# Patient Record
Sex: MALE | Race: WHITE | NOT HISPANIC OR LATINO | URBAN - METROPOLITAN AREA
[De-identification: names, ages, dates, MRNs, and addresses within clinical notes are randomized per-mention and may not be internally consistent; named-entity substitution may affect disease eponyms.]

---

## 2017-01-21 ENCOUNTER — INPATIENT (INPATIENT)
Facility: HOSPITAL | Age: 62
LOS: 7 days | Discharge: HOME | DRG: 208 | End: 2017-01-29
Attending: HOSPITALIST | Admitting: INTERNAL MEDICINE
Payer: COMMERCIAL

## 2017-01-21 VITALS
OXYGEN SATURATION: 86 % | TEMPERATURE: 97 F | DIASTOLIC BLOOD PRESSURE: 92 MMHG | WEIGHT: 285.06 LBS | HEIGHT: 66 IN | RESPIRATION RATE: 18 BRPM | SYSTOLIC BLOOD PRESSURE: 152 MMHG | HEART RATE: 77 BPM

## 2017-01-21 PROCEDURE — 71010: CPT | Mod: 26

## 2017-01-21 PROCEDURE — 70450 CT HEAD/BRAIN W/O DYE: CPT | Mod: 26

## 2017-01-21 PROCEDURE — 93010 ELECTROCARDIOGRAM REPORT: CPT

## 2017-01-21 NOTE — ED STATDOCS - PROGRESS NOTE DETAILS
This is a 62 y/o M HTN presenting to the ED complaining of head pain s/p fall in the bathroom today. He states that he slipped today on the bathroom floor and hit his head on the garbage can. Denies LOC. Denies home O2. Pt also reports that he has borderline COPD and notes edema to extremities x 5 days. last tetanus unknown. pt will be moved to the main ED for further evaluation by another provider   PMD: Dr. Mcknight This is a 60 y/o M HTN presenting to the ED complaining of head pain s/p fall in the bathroom today. He states that he slipped today on the bathroom floor and hit his head on the garbage can. Denies LOC. Denies home O2. Pt also reports that he has borderline COPD and notes edema to extremities x 5 days. last tetanus unknown. On exam, morbidly obeses, b/l wheezes 1/3 up b/l sides, leg swelling noted, no hand swelling despite hx of hand swelling. Pt with anasarca, hypoxia, and recent head trama will be moved to the main ED for further evaluation by another provider   PMD: Dr. Mcknight

## 2017-01-21 NOTE — ED ADULT TRIAGE NOTE - CHIEF COMPLAINT QUOTE
pt s/p fall open wound to right sided of head, dry blood, looks like hematoma  hx HTN. COPD  pt fall last wk as well, as per wife pt being treated for bronchitis/PN

## 2017-01-22 DIAGNOSIS — J96.01 ACUTE RESPIRATORY FAILURE WITH HYPOXIA: ICD-10-CM

## 2017-01-22 DIAGNOSIS — I50.9 HEART FAILURE, UNSPECIFIED: ICD-10-CM

## 2017-01-22 DIAGNOSIS — J18.9 PNEUMONIA, UNSPECIFIED ORGANISM: ICD-10-CM

## 2017-01-22 DIAGNOSIS — R09.02 HYPOXEMIA: ICD-10-CM

## 2017-01-22 DIAGNOSIS — J44.1 CHRONIC OBSTRUCTIVE PULMONARY DISEASE WITH (ACUTE) EXACERBATION: ICD-10-CM

## 2017-01-22 LAB
ALBUMIN SERPL ELPH-MCNC: 3.6 G/DL — SIGNIFICANT CHANGE UP (ref 3.3–5.2)
ALP SERPL-CCNC: 97 U/L — SIGNIFICANT CHANGE UP (ref 40–120)
ALT FLD-CCNC: 55 U/L — HIGH
ANION GAP SERPL CALC-SCNC: 11 MMOL/L — SIGNIFICANT CHANGE UP (ref 5–17)
ANION GAP SERPL CALC-SCNC: 11 MMOL/L — SIGNIFICANT CHANGE UP (ref 5–17)
APPEARANCE UR: CLEAR — SIGNIFICANT CHANGE UP
APTT BLD: 36.3 SEC — SIGNIFICANT CHANGE UP (ref 27.5–37.4)
AST SERPL-CCNC: 39 U/L — SIGNIFICANT CHANGE UP
BASE EXCESS BLDA CALC-SCNC: 2 MMOL/L — SIGNIFICANT CHANGE UP (ref -3–3)
BASE EXCESS BLDA CALC-SCNC: 4.3 MMOL/L — HIGH (ref -3–3)
BASE EXCESS BLDA CALC-SCNC: 7.6 MMOL/L — HIGH (ref -3–3)
BASOPHILS # BLD AUTO: 0 K/UL — SIGNIFICANT CHANGE UP (ref 0–0.2)
BASOPHILS NFR BLD AUTO: 0.3 % — SIGNIFICANT CHANGE UP (ref 0–2)
BILIRUB SERPL-MCNC: 0.3 MG/DL — LOW (ref 0.4–2)
BILIRUB UR-MCNC: NEGATIVE — SIGNIFICANT CHANGE UP
BLD GP AB SCN SERPL QL: SIGNIFICANT CHANGE UP
BLOOD GAS COMMENTS ARTERIAL: SIGNIFICANT CHANGE UP
BUN SERPL-MCNC: 27 MG/DL — HIGH (ref 8–20)
BUN SERPL-MCNC: 28 MG/DL — HIGH (ref 8–20)
CALCIUM SERPL-MCNC: 8.2 MG/DL — LOW (ref 8.6–10.2)
CALCIUM SERPL-MCNC: 8.7 MG/DL — SIGNIFICANT CHANGE UP (ref 8.6–10.2)
CHLORIDE SERPL-SCNC: 90 MMOL/L — LOW (ref 98–107)
CHLORIDE SERPL-SCNC: 92 MMOL/L — LOW (ref 98–107)
CK MB CFR SERPL CALC: 12.1 NG/ML — HIGH (ref 0–6.7)
CK SERPL-CCNC: 547 U/L — HIGH (ref 30–200)
CK SERPL-CCNC: 626 U/L — HIGH (ref 30–200)
CO2 SERPL-SCNC: 32 MMOL/L — HIGH (ref 22–29)
CO2 SERPL-SCNC: 32 MMOL/L — HIGH (ref 22–29)
COLOR SPEC: YELLOW — SIGNIFICANT CHANGE UP
CREAT SERPL-MCNC: 1.03 MG/DL — SIGNIFICANT CHANGE UP (ref 0.5–1.3)
CREAT SERPL-MCNC: 1.14 MG/DL — SIGNIFICANT CHANGE UP (ref 0.5–1.3)
DIFF PNL FLD: ABNORMAL
EOSINOPHIL # BLD AUTO: 0.2 K/UL — SIGNIFICANT CHANGE UP (ref 0–0.5)
EOSINOPHIL NFR BLD AUTO: 3.5 % — SIGNIFICANT CHANGE UP (ref 0–6)
GAS PNL BLDA: SIGNIFICANT CHANGE UP
GLUCOSE SERPL-MCNC: 138 MG/DL — HIGH (ref 70–115)
GLUCOSE SERPL-MCNC: 97 MG/DL — SIGNIFICANT CHANGE UP (ref 70–115)
GLUCOSE UR QL: NEGATIVE MG/DL — SIGNIFICANT CHANGE UP
HCO3 BLDA-SCNC: 26 MMOL/L — SIGNIFICANT CHANGE UP (ref 20–26)
HCO3 BLDA-SCNC: 28 MMOL/L — HIGH (ref 20–26)
HCO3 BLDA-SCNC: 31 MMOL/L — HIGH (ref 20–26)
HCT VFR BLD CALC: 40.6 % — LOW (ref 42–52)
HGB BLD-MCNC: 13.1 G/DL — LOW (ref 14–18)
HOROWITZ INDEX BLDA+IHG-RTO: 0.4 — SIGNIFICANT CHANGE UP
HOROWITZ INDEX BLDA+IHG-RTO: 0.5 — SIGNIFICANT CHANGE UP
HOROWITZ INDEX BLDA+IHG-RTO: 60 — SIGNIFICANT CHANGE UP
INR BLD: 1.05 RATIO — SIGNIFICANT CHANGE UP (ref 0.88–1.16)
KETONES UR-MCNC: NEGATIVE — SIGNIFICANT CHANGE UP
LACTATE BLDV-MCNC: 0.7 MMOL/L — SIGNIFICANT CHANGE UP (ref 0.5–1.6)
LEUKOCYTE ESTERASE UR-ACNC: NEGATIVE — SIGNIFICANT CHANGE UP
LYMPHOCYTES # BLD AUTO: 1.1 K/UL — SIGNIFICANT CHANGE UP (ref 1–4.8)
LYMPHOCYTES # BLD AUTO: 16 % — LOW (ref 20–55)
MAGNESIUM SERPL-MCNC: 1.9 MG/DL — SIGNIFICANT CHANGE UP (ref 1.8–2.5)
MCHC RBC-ENTMCNC: 28.6 PG — SIGNIFICANT CHANGE UP (ref 27–31)
MCHC RBC-ENTMCNC: 32.3 G/DL — SIGNIFICANT CHANGE UP (ref 32–36)
MCV RBC AUTO: 88.6 FL — SIGNIFICANT CHANGE UP (ref 80–94)
MONOCYTES # BLD AUTO: 0.8 K/UL — SIGNIFICANT CHANGE UP (ref 0–0.8)
MONOCYTES NFR BLD AUTO: 11.3 % — HIGH (ref 3–10)
NEUTROPHILS # BLD AUTO: 4.7 K/UL — SIGNIFICANT CHANGE UP (ref 1.8–8)
NEUTROPHILS NFR BLD AUTO: 68.6 % — SIGNIFICANT CHANGE UP (ref 37–73)
NITRITE UR-MCNC: NEGATIVE — SIGNIFICANT CHANGE UP
NT-PROBNP SERPL-SCNC: 84 PG/ML — SIGNIFICANT CHANGE UP (ref 0–300)
PCO2 BLDA: 57 MMHG — HIGH (ref 35–45)
PCO2 BLDA: 90 MMHG — CRITICAL HIGH (ref 35–45)
PCO2 BLDA: 98 MMHG — CRITICAL HIGH (ref 35–45)
PH BLDA: 7.19 — CRITICAL LOW (ref 7.35–7.45)
PH BLDA: 7.2 — CRITICAL LOW (ref 7.35–7.45)
PH BLDA: 7.39 — SIGNIFICANT CHANGE UP (ref 7.35–7.45)
PH UR: 6 — SIGNIFICANT CHANGE UP (ref 4.8–8)
PLATELET # BLD AUTO: 225 K/UL — SIGNIFICANT CHANGE UP (ref 150–400)
PO2 BLDA: 60 MMHG — LOW (ref 83–108)
PO2 BLDA: 71 MMHG — LOW (ref 83–108)
PO2 BLDA: 86 MMHG — SIGNIFICANT CHANGE UP (ref 83–108)
POTASSIUM SERPL-MCNC: 4.1 MMOL/L — SIGNIFICANT CHANGE UP (ref 3.5–5.3)
POTASSIUM SERPL-MCNC: 4.5 MMOL/L — SIGNIFICANT CHANGE UP (ref 3.5–5.3)
POTASSIUM SERPL-SCNC: 4.1 MMOL/L — SIGNIFICANT CHANGE UP (ref 3.5–5.3)
POTASSIUM SERPL-SCNC: 4.5 MMOL/L — SIGNIFICANT CHANGE UP (ref 3.5–5.3)
PROT SERPL-MCNC: 6.6 G/DL — SIGNIFICANT CHANGE UP (ref 6.6–8.7)
PROT UR-MCNC: NEGATIVE MG/DL — SIGNIFICANT CHANGE UP
PROTHROM AB SERPL-ACNC: 11.6 SEC — SIGNIFICANT CHANGE UP (ref 10–13.1)
RAPID RVP RESULT: SIGNIFICANT CHANGE UP
RBC # BLD: 4.58 M/UL — LOW (ref 4.6–6.2)
RBC # FLD: 15.1 % — SIGNIFICANT CHANGE UP (ref 11–15.6)
SAO2 % BLDA: 91 % — LOW (ref 95–99)
SAO2 % BLDA: 92 % — LOW (ref 95–99)
SAO2 % BLDA: 94 % — LOW (ref 95–99)
SODIUM SERPL-SCNC: 133 MMOL/L — LOW (ref 135–145)
SODIUM SERPL-SCNC: 135 MMOL/L — SIGNIFICANT CHANGE UP (ref 135–145)
SP GR SPEC: 1.01 — SIGNIFICANT CHANGE UP (ref 1.01–1.02)
TROPONIN T SERPL-MCNC: <0.01 NG/ML — SIGNIFICANT CHANGE UP (ref 0–0.06)
TROPONIN T SERPL-MCNC: <0.01 NG/ML — SIGNIFICANT CHANGE UP (ref 0–0.06)
TYPE + AB SCN PNL BLD: SIGNIFICANT CHANGE UP
UROBILINOGEN FLD QL: NEGATIVE MG/DL — SIGNIFICANT CHANGE UP
WBC # BLD: 6.88 K/UL — SIGNIFICANT CHANGE UP (ref 4.8–10.8)
WBC # FLD AUTO: 6.88 K/UL — SIGNIFICANT CHANGE UP (ref 4.8–10.8)

## 2017-01-22 PROCEDURE — 71010: CPT | Mod: 26

## 2017-01-22 PROCEDURE — 99284 EMERGENCY DEPT VISIT MOD MDM: CPT | Mod: 25

## 2017-01-22 PROCEDURE — 99291 CRITICAL CARE FIRST HOUR: CPT

## 2017-01-22 PROCEDURE — 71275 CT ANGIOGRAPHY CHEST: CPT | Mod: 26

## 2017-01-22 PROCEDURE — 12002 RPR S/N/AX/GEN/TRNK2.6-7.5CM: CPT

## 2017-01-22 RX ORDER — VECURONIUM BROMIDE 20 MG/1
10 INJECTION, POWDER, FOR SOLUTION INTRAVENOUS ONCE
Qty: 0 | Refills: 0 | Status: COMPLETED | OUTPATIENT
Start: 2017-01-22 | End: 2017-01-22

## 2017-01-22 RX ORDER — DEXMEDETOMIDINE HYDROCHLORIDE IN 0.9% SODIUM CHLORIDE 4 UG/ML
0.4 INJECTION INTRAVENOUS
Qty: 200 | Refills: 0 | Status: DISCONTINUED | OUTPATIENT
Start: 2017-01-22 | End: 2017-01-25

## 2017-01-22 RX ORDER — FENTANYL CITRATE 50 UG/ML
100 INJECTION INTRAVENOUS
Qty: 0 | Refills: 0 | Status: DISCONTINUED | OUTPATIENT
Start: 2017-01-22 | End: 2017-01-22

## 2017-01-22 RX ORDER — AZITHROMYCIN 500 MG/1
500 TABLET, FILM COATED ORAL ONCE
Qty: 0 | Refills: 0 | Status: COMPLETED | OUTPATIENT
Start: 2017-01-22 | End: 2017-01-22

## 2017-01-22 RX ORDER — PANTOPRAZOLE SODIUM 20 MG/1
40 TABLET, DELAYED RELEASE ORAL DAILY
Qty: 0 | Refills: 0 | Status: DISCONTINUED | OUTPATIENT
Start: 2017-01-22 | End: 2017-01-24

## 2017-01-22 RX ORDER — SODIUM CHLORIDE 9 MG/ML
1000 INJECTION INTRAMUSCULAR; INTRAVENOUS; SUBCUTANEOUS ONCE
Qty: 0 | Refills: 0 | Status: DISCONTINUED | OUTPATIENT
Start: 2017-01-22 | End: 2017-01-22

## 2017-01-22 RX ORDER — ENOXAPARIN SODIUM 100 MG/ML
40 INJECTION SUBCUTANEOUS DAILY
Qty: 0 | Refills: 0 | Status: DISCONTINUED | OUTPATIENT
Start: 2017-01-22 | End: 2017-01-29

## 2017-01-22 RX ORDER — FUROSEMIDE 40 MG
40 TABLET ORAL ONCE
Qty: 0 | Refills: 0 | Status: COMPLETED | OUTPATIENT
Start: 2017-01-22 | End: 2017-01-22

## 2017-01-22 RX ORDER — INFLUENZA VIRUS VACCINE 15; 15; 15; 15 UG/.5ML; UG/.5ML; UG/.5ML; UG/.5ML
0.5 SUSPENSION INTRAMUSCULAR ONCE
Qty: 0 | Refills: 0 | Status: COMPLETED | OUTPATIENT
Start: 2017-01-22 | End: 2017-01-22

## 2017-01-22 RX ORDER — AZITHROMYCIN 500 MG/1
TABLET, FILM COATED ORAL
Qty: 0 | Refills: 0 | Status: DISCONTINUED | OUTPATIENT
Start: 2017-01-22 | End: 2017-01-24

## 2017-01-22 RX ORDER — CHLORHEXIDINE GLUCONATE 213 G/1000ML
15 SOLUTION TOPICAL
Qty: 0 | Refills: 0 | Status: DISCONTINUED | OUTPATIENT
Start: 2017-01-22 | End: 2017-01-25

## 2017-01-22 RX ORDER — IPRATROPIUM/ALBUTEROL SULFATE 18-103MCG
3 AEROSOL WITH ADAPTER (GRAM) INHALATION EVERY 6 HOURS
Qty: 0 | Refills: 0 | Status: DISCONTINUED | OUTPATIENT
Start: 2017-01-22 | End: 2017-01-29

## 2017-01-22 RX ORDER — NOREPINEPHRINE BITARTRATE/D5W 8 MG/250ML
0.3 PLASTIC BAG, INJECTION (ML) INTRAVENOUS
Qty: 8 | Refills: 0 | Status: DISCONTINUED | OUTPATIENT
Start: 2017-01-22 | End: 2017-01-23

## 2017-01-22 RX ORDER — AZITHROMYCIN 500 MG/1
500 TABLET, FILM COATED ORAL EVERY 24 HOURS
Qty: 0 | Refills: 0 | Status: DISCONTINUED | OUTPATIENT
Start: 2017-01-23 | End: 2017-01-24

## 2017-01-22 RX ORDER — PROPOFOL 10 MG/ML
10 INJECTION, EMULSION INTRAVENOUS
Qty: 1000 | Refills: 0 | Status: DISCONTINUED | OUTPATIENT
Start: 2017-01-22 | End: 2017-01-23

## 2017-01-22 RX ORDER — ETOMIDATE 2 MG/ML
20 INJECTION INTRAVENOUS ONCE
Qty: 0 | Refills: 0 | Status: COMPLETED | OUTPATIENT
Start: 2017-01-22 | End: 2017-01-22

## 2017-01-22 RX ADMIN — Medication 40 MILLIGRAM(S): at 08:00

## 2017-01-22 RX ADMIN — DEXMEDETOMIDINE HYDROCHLORIDE IN 0.9% SODIUM CHLORIDE 13.65 MICROGRAM(S)/KG/HR: 4 INJECTION INTRAVENOUS at 13:38

## 2017-01-22 RX ADMIN — DEXMEDETOMIDINE HYDROCHLORIDE IN 0.9% SODIUM CHLORIDE 13.65 MICROGRAM(S)/KG/HR: 4 INJECTION INTRAVENOUS at 18:11

## 2017-01-22 RX ADMIN — DEXMEDETOMIDINE HYDROCHLORIDE IN 0.9% SODIUM CHLORIDE 13.65 MICROGRAM(S)/KG/HR: 4 INJECTION INTRAVENOUS at 23:01

## 2017-01-22 RX ADMIN — PANTOPRAZOLE SODIUM 40 MILLIGRAM(S): 20 TABLET, DELAYED RELEASE ORAL at 13:38

## 2017-01-22 RX ADMIN — Medication 3 MILLILITER(S): at 15:02

## 2017-01-22 RX ADMIN — PROPOFOL 7.74 MICROGRAM(S)/KG/MIN: 10 INJECTION, EMULSION INTRAVENOUS at 09:30

## 2017-01-22 RX ADMIN — DEXMEDETOMIDINE HYDROCHLORIDE IN 0.9% SODIUM CHLORIDE 13.65 MICROGRAM(S)/KG/HR: 4 INJECTION INTRAVENOUS at 10:20

## 2017-01-22 RX ADMIN — VECURONIUM BROMIDE 10 MILLIGRAM(S): 20 INJECTION, POWDER, FOR SOLUTION INTRAVENOUS at 08:40

## 2017-01-22 RX ADMIN — FENTANYL CITRATE 100 MICROGRAM(S): 50 INJECTION INTRAVENOUS at 09:30

## 2017-01-22 RX ADMIN — Medication 76.78 MICROGRAM(S)/KG/MIN: at 09:10

## 2017-01-22 RX ADMIN — FENTANYL CITRATE 100 MICROGRAM(S): 50 INJECTION INTRAVENOUS at 11:45

## 2017-01-22 RX ADMIN — PROPOFOL 7.74 MICROGRAM(S)/KG/MIN: 10 INJECTION, EMULSION INTRAVENOUS at 13:38

## 2017-01-22 RX ADMIN — Medication 40 MILLIGRAM(S): at 13:37

## 2017-01-22 RX ADMIN — Medication 3 MILLILITER(S): at 20:37

## 2017-01-22 RX ADMIN — DEXMEDETOMIDINE HYDROCHLORIDE IN 0.9% SODIUM CHLORIDE 13.65 MICROGRAM(S)/KG/HR: 4 INJECTION INTRAVENOUS at 21:19

## 2017-01-22 RX ADMIN — AZITHROMYCIN 255 MILLIGRAM(S): 500 TABLET, FILM COATED ORAL at 08:54

## 2017-01-22 RX ADMIN — Medication 125 MILLIGRAM(S): at 08:30

## 2017-01-22 RX ADMIN — Medication 40 MILLIGRAM(S): at 18:11

## 2017-01-22 RX ADMIN — Medication 3 MILLILITER(S): at 09:15

## 2017-01-22 RX ADMIN — ETOMIDATE 20 MILLIGRAM(S): 2 INJECTION INTRAVENOUS at 08:45

## 2017-01-22 NOTE — ED ADULT NURSE NOTE - OBJECTIVE STATEMENT
Pt received sitting on stretcher from Ct scan. Pt AOx3 C/o falling and hitting his head. PT states he doesn't remember what happened . Neuro WNL. PERRLA. Lungs CTA, RR even unlabored.  + 2 Edema BL, Generalized edema. +2. Ab soft Distended Non tender, + bowel sounds x 4quads. Denies Nausea, Vomiting, Diarrhea. Skin warm, dry, color appropriate for age and race. 8cm Lac noted to back of the head. Dry blood  not bleeding at this time. Pt denies LOC.

## 2017-01-22 NOTE — ED PROVIDER NOTE - RESPIRATORY, MLM
Rhonchi auscultated diffusely. Hypoxic on RA (SpO2 dropped below 90% during evaluation, increased to 97% on 2-3L supplemental O2).

## 2017-01-22 NOTE — ED PROVIDER NOTE - OBJECTIVE STATEMENT
62 y/o male with h/o HTN presents to ED c/o non-productive cough x 2 weeks, with increasing SOB, generalized weakness, cough, and voice hoarseness x 3 days. Pt states that he was evaluated by his PMD at onset of symptoms with negative CXR, Dx bronchitis, Rx abx and steroids though his symptoms have not improved. Pt has also noticed increased leg swelling and abdominal distension over the past two days. Per pt's wife at bedside, pt has been reluctant to seek further evaluation for his worsened symptoms, however pt sustained a fall this evening, prompting him to seek ED evaluation. Pt reports that he slipped and fell on his wet bathroom floor and hit his head on the garbage pail. He denies LOC and believes that his fall was mechanical in nature. Pt has no other complaints at this time. No h/o cardiac disease. No home O2.

## 2017-01-22 NOTE — H&P ADULT. - ASSESSMENT
62 y/o male with h/o HTN presents to ED c/o non-productive cough x 2 weeks, with increasing SOB, generalized weakness, cough, and voice hoarseness x 3 days. Pt states that he was evaluated by his PMD and early diagnosis of COPD and was placed on short course of steroids   I evaluated pt in ER found to be be extremely short of breath with increased wob and accessory muscle use to beath in acute hypoxic hypercapnic respiratory failure. Pt placed on BIPAP ( initial ABG on BIPAP) Pt accepted to ICU plan to continue BIPAP and repeat ABG and evaluate the need for intubation if no improvement

## 2017-01-22 NOTE — ED PROVIDER NOTE - NS ED MD SCRIBE ATTENDING SCRIBE SECTIONS
VITAL SIGNS( Pullset)/DISPOSITION/PHYSICAL EXAM/REVIEW OF SYSTEMS/PAST MEDICAL/SURGICAL/SOCIAL HISTORY/HISTORY OF PRESENT ILLNESS/HIV

## 2017-01-22 NOTE — H&P ADULT. - ATTENDING COMMENTS
This paitent was seen at 0605 for 40 mins initial critical care time   H&P is written at current time I saw this patient prior to and after intubation.  He failed NIPPV and required intubation -- developed shock subsequently requiring norepinephrine.  He Has ARDS as well as severe hypercapnea; we are ordering a prone bed.  His preceding symptoms lack clarity -- he has had several falls of late (ie since 12/25/16); his mental status has been intermittently cloudy as well (per daughter, who is a critical care NP -- thought in hindsight that he may have been hypoxic/hypercapneic).  He was given steroids recently for "bronchitis" along with a ZPak.    Continue with full ventilatory support; now needs PEEP 12 FiO2 100% to maintain oxygenation.  May give a dose of paralytic to see if that improves oxygenation.    Updated wife and daughter at bedside.

## 2017-01-22 NOTE — ED PROVIDER NOTE - PROGRESS NOTE DETAILS
During evaluation patient with worsening mental. ABG indicating hypercapnia. Patient to be admitted to the ICU. During evaluation by admitting team patient with worsening mental. ABG indicating hypercapnia. Patient to be admitted to the ICU.

## 2017-01-22 NOTE — ED ADULT NURSE REASSESSMENT NOTE - NS ED NURSE REASSESS COMMENT FT1
[facility]. Pt sleeping Pt accusable. pt having Apneic  episodes. decreas SPO2 80s MD made aware. Pt placed on Oxy mask. SPO2 99. Pt comfortable.
Pt started to decompensate, Pt developed labored breathing. Pt arousable to stimulus.   Pt using abdominal and  accessory muscles. Hospitalist at bedside. orders noted and initiated. Pt placed on Bi pap. Pt tolerating well. Pt transported to ICU.

## 2017-01-22 NOTE — ED PROCEDURE NOTE - CPROC ED POST PROC CARE GUIDE1
Verbal/written post procedure instructions were given to patient/caregiver./Instructed patient/caregiver to follow-up with primary care physician.

## 2017-01-22 NOTE — H&P ADULT. - HISTORY OF PRESENT ILLNESS
60 y/o male with h/o HTN presents to ED c/o non-productive cough x 2 weeks, with increasing SOB, generalized weakness, cough, and voice hoarseness x 3 days. Pt states that he was evaluated by his PMD at onset of symptoms with negative CXR, Dx bronchitis, Rx abx and steroids though his symptoms have not improved. Pt has also noticed increased leg swelling and abdominal distension over the past two days. Per pt's wife at bedside, pt has been reluctant to seek further evaluation for his worsened symptoms, however pt sustained a fall this evening, prompting him to seek ED evaluation. Pt reports that he slipped and fell on his wet bathroom floor and hit his head on the garbage pail. He denies LOC and believes that his fall was mechanical in nature. Pt has no other complaints at this time. No h/o cardiac disease

## 2017-01-22 NOTE — PROGRESS NOTE ADULT - SUBJECTIVE AND OBJECTIVE BOX
Pt is a 61 YOM h/o recently dx COPD who was admitted to the hospital with acute shortness of breath, hypercarbia and hypoxia.  After d/w family he has been symptomatic for several months but had refused care previously and has had several falls at home.  This am upon my arrival pt had ABG on Bipap with maximal settings and his hypercarbia and hypoxia were not improving despite bipap and steroids and nebs.  He was urgently intubated.  He was given Etomidate 20mg, Vecuronium 10mg and 30mg of Propofol for intubation.  ETT was place by resp therapist using glidescope with one attempt.  Pt tolerated well without desat or drop in SBP.  He was started on ventilator and CXR confirms ETT in good place.  Family was at bedside and aware of above and agree with aggressive management.    Patient is a 61y old  Male who presents with a chief complaint of difficulty breathing (2017 07:20)      BRIEF HOSPITAL COURSE: ***    Events last 24 hours: ***    PAST MEDICAL & SURGICAL HISTORY:  HTN (hypertension)  No significant past surgical history      Review of Systems:  Pt unable to answer questions in ROS/obtunded.      Medications:  azithromycin  IVPB  IV Intermittent     norepinephrine Infusion 0.3MICROgram(s)/kG/Min IV Continuous <Continuous>    ALBUTerol/ipratropium for Nebulization 3milliLiter(s) Nebulizer every 6 hours    propofol Infusion 10MICROgram(s)/kG/Min IV Continuous <Continuous>  vecuronium Injectable 10milliGRAM(s) IV Push once  dexmedetomidine Infusion 0.4MICROgram(s)/kG/Hr IV Continuous <Continuous>        pantoprazole  Injectable 40milliGRAM(s) IV Push daily      methylPREDNISolone sodium succinate Injectable 40milliGRAM(s) IV Push every 6 hours              Mode: AC/ CMV (Assist Control/ Continuous Mandatory Ventilation)  RR (machine): 20  TV (machine): 420  FiO2: 100  PEEP: 12  MAP: 16  PIP: 34      ICU Vital Signs Last 24 Hrs  T(C): 36.6, Max: 37 (- @ 08:00)  T(F): 97.8, Max: 98.6 ( @ 08:00)  HR: 65 (63 - 83)  BP: 83/52 (81/52 - 155/86)  BP(mean): 62 (62 - 115)  ABP: 89/55 (81/56 - 125/69)  ABP(mean): 68 (64 - 86)  RR: 26 (18 - 53)  SpO2: 93% (79% - 98%)      ABG - ( 2017 10:52 )  pH: 7.29  /  pCO2: 74    /  pO2: 77    / HCO3: 30    / Base Excess: 5.9   /  SaO2: 95                        LABS:                        13.1   6.88  )-----------( 225      ( 2017 00:16 )             40.6     2017 00:16    135    |  92     |  27.0   ----------------------------<  97     4.1     |  32.0   |  1.14     Ca    8.7        2017 00:16  Mg     1.9       2017 00:16    TPro  6.6    /  Alb  3.6    /  TBili  0.3    /  DBili  x      /  AST  39     /  ALT  55     /  AlkPhos  97     2017 00:16      CARDIAC MARKERS ( 2017 00:16 )  x     / <0.01 ng/mL / 626 U/L / x     / 12.1 ng/mL      CAPILLARY BLOOD GLUCOSE    PT/INR - ( 2017 00:16 )   PT: 11.6 sec;   INR: 1.05 ratio         PTT - ( 2017 00:16 )  PTT:36.3 sec  Urinalysis Basic - ( 2017 10:10 )    Color: Yellow / Appearance: Clear / S.015 / pH: x  Gluc: x / Ketone: Negative  / Bili: Negative / Urobili: Negative mg/dL   Blood: x / Protein: Negative mg/dL / Nitrite: Negative   Leuk Esterase: Negative / RBC: 11-25 /HPF / WBC 0-2   Sq Epi: x / Non Sq Epi: x / Bacteria: Occasional      CULTURES:  Rapid RVP Result: NotDetec ( @ 07:16)      Physical Examination:    General: lethargic and unresponsive. severe resp distress    HEENT: Pupils equal, reactive to light.  Symmetric.    PULM: Poor air exchange, extremely tight with occ exp wheeze.  Severely diminished b/l at bases.    CVS: Regular rate and rhythm, no murmurs, rubs, or gallops    ABD: Soft, nondistended, nontender, normoactive bowel sounds, no masses    EXT: Mild edema, nontender    SKIN: Warm and well perfused, no rashes noted.    RADIOLOGY: ***    CRITICAL CARE TIME SPENT: *** Pt is a 61 YOM h/o recently dx COPD who was admitted to the hospital with acute shortness of breath, hypercarbia and hypoxia.  After d/w family he has been symptomatic for several months but had refused care previously and has had several falls at home.  This am upon my arrival pt had ABG on Bipap with maximal settings and his hypercarbia and hypoxia were not improving despite bipap and steroids and nebs.  He was urgently intubated.  He was given Etomidate 20mg, Vecuronium 10mg and 30mg of Propofol for intubation.  ETT was place by resp therapist using glidescope with one attempt.  Pt tolerated well without desat or drop in SBP.  He was started on ventilator and CXR confirms ETT in good place.  Family was at bedside and aware of above and agree with aggressive management.    Patient is a 61y old  Male who presents with a chief complaint of difficulty breathing (2017 07:20)      BRIEF HOSPITAL COURSE: ***    Events last 24 hours: ***    PAST MEDICAL & SURGICAL HISTORY:  HTN (hypertension)  No significant past surgical history      Review of Systems:  Pt unable to answer questions in ROS/obtunded.      Medications:  azithromycin  IVPB  IV Intermittent     norepinephrine Infusion 0.3MICROgram(s)/kG/Min IV Continuous <Continuous>    ALBUTerol/ipratropium for Nebulization 3milliLiter(s) Nebulizer every 6 hours    propofol Infusion 10MICROgram(s)/kG/Min IV Continuous <Continuous>  vecuronium Injectable 10milliGRAM(s) IV Push once  dexmedetomidine Infusion 0.4MICROgram(s)/kG/Hr IV Continuous <Continuous>        pantoprazole  Injectable 40milliGRAM(s) IV Push daily      methylPREDNISolone sodium succinate Injectable 40milliGRAM(s) IV Push every 6 hours              Mode: AC/ CMV (Assist Control/ Continuous Mandatory Ventilation)  RR (machine): 20  TV (machine): 420  FiO2: 100  PEEP: 12  MAP: 16  PIP: 34      ICU Vital Signs Last 24 Hrs  T(C): 36.6, Max: 37 (- @ 08:00)  T(F): 97.8, Max: 98.6 ( @ 08:00)  HR: 65 (63 - 83)  BP: 83/52 (81/52 - 155/86)  BP(mean): 62 (62 - 115)  ABP: 89/55 (81/56 - 125/69)  ABP(mean): 68 (64 - 86)  RR: 26 (18 - 53)  SpO2: 93% (79% - 98%)      ABG - ( 2017 10:52 )  pH: 7.29  /  pCO2: 74    /  pO2: 77    / HCO3: 30    / Base Excess: 5.9   /  SaO2: 95                        LABS:                        13.1   6.88  )-----------( 225      ( 2017 00:16 )             40.6     2017 00:16    135    |  92     |  27.0   ----------------------------<  97     4.1     |  32.0   |  1.14     Ca    8.7        2017 00:16  Mg     1.9       2017 00:16    TPro  6.6    /  Alb  3.6    /  TBili  0.3    /  DBili  x      /  AST  39     /  ALT  55     /  AlkPhos  97     2017 00:16      CARDIAC MARKERS ( 2017 00:16 )  x     / <0.01 ng/mL / 626 U/L / x     / 12.1 ng/mL      CAPILLARY BLOOD GLUCOSE    PT/INR - ( 2017 00:16 )   PT: 11.6 sec;   INR: 1.05 ratio         PTT - ( 2017 00:16 )  PTT:36.3 sec  Urinalysis Basic - ( 2017 10:10 )    Color: Yellow / Appearance: Clear / S.015 / pH: x  Gluc: x / Ketone: Negative  / Bili: Negative / Urobili: Negative mg/dL   Blood: x / Protein: Negative mg/dL / Nitrite: Negative   Leuk Esterase: Negative / RBC: 11-25 /HPF / WBC 0-2   Sq Epi: x / Non Sq Epi: x / Bacteria: Occasional      CULTURES:  Rapid RVP Result: NotDetec ( @ 07:16)      Physical Examination:    General: lethargic and unresponsive. severe resp distress    HEENT: Pupils equal, reactive to light.  Symmetric.    PULM: Poor air exchange, extremely tight with occ exp wheeze.  Severely diminished b/l at bases.    CVS: Regular rate and rhythm, no murmurs, rubs, or gallops    ABD: Soft, nondistended, nontender, normoactive bowel sounds, no masses    EXT: Mild edema, nontender    SKIN: Warm and well perfused, no rashes noted.    RADIOLOGY: ***    CRITICAL CARE TIME SPENT: 65 minutes at bedside

## 2017-01-22 NOTE — AIRWAY PLACEMENT NOTE ADULT - POST AIRWAY PLACEMENT ASSESSMENT:
breath sounds bilateral/CXR pending/chest excursion noted/positive end tidal CO2 noted/breath sounds equal

## 2017-01-22 NOTE — PROCEDURE NOTE - NSPROCDETAILS_GEN_ALL_CORE
Seldinger technique/positive blood return obtained via catheter/all materials/supplies accounted for at end of procedure/sutured in place/location identified, draped/prepped, sterile technique used, needle inserted/introduced/connected to a pressurized flush line

## 2017-01-23 DIAGNOSIS — R41.82 ALTERED MENTAL STATUS, UNSPECIFIED: ICD-10-CM

## 2017-01-23 LAB
ALBUMIN SERPL ELPH-MCNC: 3.4 G/DL — SIGNIFICANT CHANGE UP (ref 3.3–5.2)
ALP SERPL-CCNC: 88 U/L — SIGNIFICANT CHANGE UP (ref 40–120)
ALT FLD-CCNC: 43 U/L — HIGH
ANION GAP SERPL CALC-SCNC: 13 MMOL/L — SIGNIFICANT CHANGE UP (ref 5–17)
AST SERPL-CCNC: 29 U/L — SIGNIFICANT CHANGE UP
BILIRUB SERPL-MCNC: 0.3 MG/DL — LOW (ref 0.4–2)
BUN SERPL-MCNC: 33 MG/DL — HIGH (ref 8–20)
CALCIUM SERPL-MCNC: 8.4 MG/DL — LOW (ref 8.6–10.2)
CHLORIDE SERPL-SCNC: 92 MMOL/L — LOW (ref 98–107)
CO2 SERPL-SCNC: 31 MMOL/L — HIGH (ref 22–29)
CREAT SERPL-MCNC: 0.97 MG/DL — SIGNIFICANT CHANGE UP (ref 0.5–1.3)
GLUCOSE SERPL-MCNC: 153 MG/DL — HIGH (ref 70–115)
HCT VFR BLD CALC: 40.5 % — LOW (ref 42–52)
HGB BLD-MCNC: 12.9 G/DL — LOW (ref 14–18)
INR BLD: 1.13 RATIO — SIGNIFICANT CHANGE UP (ref 0.88–1.16)
MAGNESIUM SERPL-MCNC: 2 MG/DL — SIGNIFICANT CHANGE UP (ref 1.8–2.5)
MCHC RBC-ENTMCNC: 28.2 PG — SIGNIFICANT CHANGE UP (ref 27–31)
MCHC RBC-ENTMCNC: 31.9 G/DL — LOW (ref 32–36)
MCV RBC AUTO: 88.6 FL — SIGNIFICANT CHANGE UP (ref 80–94)
PHOSPHATE SERPL-MCNC: 3.4 MG/DL — SIGNIFICANT CHANGE UP (ref 2.4–4.7)
PLATELET # BLD AUTO: 193 K/UL — SIGNIFICANT CHANGE UP (ref 150–400)
POTASSIUM SERPL-MCNC: 4.5 MMOL/L — SIGNIFICANT CHANGE UP (ref 3.5–5.3)
POTASSIUM SERPL-SCNC: 4.5 MMOL/L — SIGNIFICANT CHANGE UP (ref 3.5–5.3)
PROCALCITONIN SERPL-MCNC: <0.23 NG/ML — SIGNIFICANT CHANGE UP (ref 0–0.5)
PROT SERPL-MCNC: 6.3 G/DL — LOW (ref 6.6–8.7)
PROTHROM AB SERPL-ACNC: 12.4 SEC — SIGNIFICANT CHANGE UP (ref 10–13.1)
RBC # BLD: 4.57 M/UL — LOW (ref 4.6–6.2)
RBC # FLD: 15.1 % — SIGNIFICANT CHANGE UP (ref 11–15.6)
SODIUM SERPL-SCNC: 136 MMOL/L — SIGNIFICANT CHANGE UP (ref 135–145)
WBC # BLD: 5.85 K/UL — SIGNIFICANT CHANGE UP (ref 4.8–10.8)
WBC # FLD AUTO: 5.85 K/UL — SIGNIFICANT CHANGE UP (ref 4.8–10.8)

## 2017-01-23 PROCEDURE — 99291 CRITICAL CARE FIRST HOUR: CPT

## 2017-01-23 RX ORDER — DOXAZOSIN MESYLATE 4 MG
1 TABLET ORAL AT BEDTIME
Qty: 0 | Refills: 0 | Status: DISCONTINUED | OUTPATIENT
Start: 2017-01-23 | End: 2017-01-24

## 2017-01-23 RX ADMIN — ENOXAPARIN SODIUM 40 MILLIGRAM(S): 100 INJECTION SUBCUTANEOUS at 11:45

## 2017-01-23 RX ADMIN — DEXMEDETOMIDINE HYDROCHLORIDE IN 0.9% SODIUM CHLORIDE 13.65 MICROGRAM(S)/KG/HR: 4 INJECTION INTRAVENOUS at 05:28

## 2017-01-23 RX ADMIN — CHLORHEXIDINE GLUCONATE 15 MILLILITER(S): 213 SOLUTION TOPICAL at 18:17

## 2017-01-23 RX ADMIN — Medication 1 MILLIGRAM(S): at 22:42

## 2017-01-23 RX ADMIN — CHLORHEXIDINE GLUCONATE 15 MILLILITER(S): 213 SOLUTION TOPICAL at 05:27

## 2017-01-23 RX ADMIN — AZITHROMYCIN 255 MILLIGRAM(S): 500 TABLET, FILM COATED ORAL at 06:38

## 2017-01-23 RX ADMIN — Medication 40 MILLIGRAM(S): at 11:45

## 2017-01-23 RX ADMIN — DEXMEDETOMIDINE HYDROCHLORIDE IN 0.9% SODIUM CHLORIDE 13.65 MICROGRAM(S)/KG/HR: 4 INJECTION INTRAVENOUS at 18:16

## 2017-01-23 RX ADMIN — Medication 40 MILLIGRAM(S): at 18:17

## 2017-01-23 RX ADMIN — Medication 3 MILLILITER(S): at 21:20

## 2017-01-23 RX ADMIN — DEXMEDETOMIDINE HYDROCHLORIDE IN 0.9% SODIUM CHLORIDE 13.65 MICROGRAM(S)/KG/HR: 4 INJECTION INTRAVENOUS at 13:34

## 2017-01-23 RX ADMIN — Medication 3 MILLILITER(S): at 02:58

## 2017-01-23 RX ADMIN — Medication 3 MILLILITER(S): at 12:00

## 2017-01-23 RX ADMIN — PROPOFOL 7.74 MICROGRAM(S)/KG/MIN: 10 INJECTION, EMULSION INTRAVENOUS at 05:27

## 2017-01-23 RX ADMIN — DEXMEDETOMIDINE HYDROCHLORIDE IN 0.9% SODIUM CHLORIDE 13.65 MICROGRAM(S)/KG/HR: 4 INJECTION INTRAVENOUS at 03:16

## 2017-01-23 RX ADMIN — Medication 3 MILLILITER(S): at 08:09

## 2017-01-23 RX ADMIN — PROPOFOL 7.74 MICROGRAM(S)/KG/MIN: 10 INJECTION, EMULSION INTRAVENOUS at 00:11

## 2017-01-23 RX ADMIN — DEXMEDETOMIDINE HYDROCHLORIDE IN 0.9% SODIUM CHLORIDE 13.65 MICROGRAM(S)/KG/HR: 4 INJECTION INTRAVENOUS at 10:40

## 2017-01-23 RX ADMIN — Medication 40 MILLIGRAM(S): at 00:11

## 2017-01-23 RX ADMIN — DEXMEDETOMIDINE HYDROCHLORIDE IN 0.9% SODIUM CHLORIDE 13.65 MICROGRAM(S)/KG/HR: 4 INJECTION INTRAVENOUS at 20:35

## 2017-01-23 RX ADMIN — PANTOPRAZOLE SODIUM 40 MILLIGRAM(S): 20 TABLET, DELAYED RELEASE ORAL at 11:44

## 2017-01-23 RX ADMIN — Medication 40 MILLIGRAM(S): at 05:27

## 2017-01-23 NOTE — PROGRESS NOTE ADULT - ASSESSMENT
61 YOM with acute hypercarbic and hypoxic respiratory failure, COPD exacerbation, r/o atypical PNA, AMS

## 2017-01-23 NOTE — CONSULT NOTE ADULT - ASSESSMENT
Assessment  Resp failure ?etiology, likely MARY/Pickwickian, no ECG or echo evidence of pul htn or CHF  Recent bronchitis, r/o early PNA  Hypertenion controlled  Obesity      Rec:  Cont empiric AB coverage  Cont BP control with ARB if needed, avoid beta blocker  Will review echo, if suboptimal imaging and difficulty weaning from vent may consider FRANCOIS for better assessment of LV/RV function and exclude valvular pathology

## 2017-01-23 NOTE — PROGRESS NOTE ADULT - SUBJECTIVE AND OBJECTIVE BOX
Pt is a 61 YOM h/o HTN, recently dx COPD who was admitted with hypercarbic respiratory failure.  He initially failed a trial of bipap and was intubated yesterday morning.  After intubation he started to developed ARDS conditions and required maximal ventilatory settings with 100% Fi02 and 15 PEEP.  A rotoprone bed was called for, but within an hour the pt's condition began to improve and we were able to wean his vent settings.  Today he is on 50% with 10 PEEP and tolerating that well.  He is on precedex and propofol but able to open his eyes and follows full commands.  He is off pressors which he required for only a short period yesterday post intubation.  Patient is a 61y old  Male who presents with a chief complaint of difficulty breathing (2017 07:20)      BRIEF HOSPITAL COURSE: as above    Events last 24 hours: as above    PAST MEDICAL & SURGICAL HISTORY:  HTN (hypertension)  No significant past surgical history      Review of Systems:  Pt intubated unable to answer questions in ROS      Medications:  azithromycin  IVPB 500milliGRAM(s) IV Intermittent every 24 hours  azithromycin  IVPB  IV Intermittent         ALBUTerol/ipratropium for Nebulization 3milliLiter(s) Nebulizer every 6 hours    propofol Infusion 10MICROgram(s)/kG/Min IV Continuous <Continuous>  dexmedetomidine Infusion 0.4MICROgram(s)/kG/Hr IV Continuous <Continuous>      enoxaparin Injectable 40milliGRAM(s) SubCutaneous daily    pantoprazole  Injectable 40milliGRAM(s) IV Push daily      methylPREDNISolone sodium succinate Injectable 40milliGRAM(s) IV Push every 6 hours      influenza   Vaccine 0.5milliLiter(s) IntraMuscular once    chlorhexidine 0.12% Liquid 15milliLiter(s) Swish and Spit two times a day        Mode: AC/ CMV (Assist Control/ Continuous Mandatory Ventilation)  RR (machine): 20  TV (machine): 420  FiO2: 60  PEEP: 10  MAP: 15  PIP: 27      ICU Vital Signs Last 24 Hrs  T(C): 36.5, Max: 37.4 ( @ 16:00)  T(F): 97.7, Max: 99.3 ( @ 16:00)  HR: 56 (56 - 83)  BP: 83/52 (81/52 - 155/86)  BP(mean): 62 (62 - 115)  ABP: 116/66 (81/56 - 137/92)  ABP(mean): 87 (64 - 310)  RR: 20 (19 - 53)  SpO2: 92% (79% - 100%)      ABG - ( 2017 16:16 )  pH: 7.39  /  pCO2: 57    /  pO2: 60    / HCO3: 31    / Base Excess: 7.6   /  SaO2: 92                        LABS:                        12.9   5.85  )-----------( 193      ( 2017 05:05 )             40.5     2017 05:05    136    |  92     |  33.0   ----------------------------<  153    4.5     |  31.0   |  0.97     Ca    8.4        2017 05:05  Phos  3.4       2017 05:05  Mg     2.0       2017 05:05    TPro  6.3    /  Alb  3.4    /  TBili  0.3    /  DBili  x      /  AST  29     /  ALT  43     /  AlkPhos  88     2017 05:05      CARDIAC MARKERS ( 2017 19:26 )  x     / <0.01 ng/mL / 547 U/L / x     / 10.1 ng/mL  CARDIAC MARKERS ( 2017 00:16 )  x     / <0.01 ng/mL / 626 U/L / x     / 12.1 ng/mL      CAPILLARY BLOOD GLUCOSE    PT/INR - ( 2017 05:05 )   PT: 12.4 sec;   INR: 1.13 ratio         PTT - ( 2017 00:16 )  PTT:36.3 sec  Urinalysis Basic - ( 2017 10:10 )    Color: Yellow / Appearance: Clear / S.015 / pH: x  Gluc: x / Ketone: Negative  / Bili: Negative / Urobili: Negative mg/dL   Blood: x / Protein: Negative mg/dL / Nitrite: Negative   Leuk Esterase: Negative / RBC: 11-25 /HPF / WBC 0-2   Sq Epi: x / Non Sq Epi: x / Bacteria: Occasional      CULTURES:  Rapid RVP Result: NotDetec ( @ 07:16)      Physical Examination:    General: Intubated, light sedation, able to follow commands and MAZARIEGOS    HEENT: Pupils equal, reactive to light.  Symmetric.    PULM: Diminished b/l at bases, occ exp wheeze air exchange improving from yesterday,  no significant sputum production    CVS: Regular rate and rhythm, no murmurs, rubs, or gallops    ABD: Soft, obese, nontender, normoactive bowel sounds, no masses    EXT: Mild edema, chronic venous stasis changes, nontender    SKIN: Warm and well perfused, no rashes noted.    RADIOLOGY: ***    CRITICAL CARE TIME SPENT: 45

## 2017-01-23 NOTE — CONSULT NOTE ADULT - SUBJECTIVE AND OBJECTIVE BOX
[facility], THE HEART CENTER                                   [address] [place] [address]                                                      PHONE: [phone]                                                         FAX: [phone]  [email or web address]  ---------------------------------------------------------------------------------------------------------------------------------    Reason for Consult: resp failure    HPI:  KIMBERLY PALUMBO is an 61y Male with morbid obesity, htn, no prior cArdiac history, admitted with URI, resp failure and ultimately intubated for hypercapneic resp failure.  Ct chest no evidence of PE, currently treated for outpatient PNA on IV Ab.  Cardiac consult called to exclude cardiac issues contributing to vent dependency.    PAST MEDICAL & SURGICAL HISTORY:  HTN (hypertension)  No significant past surgical history      No Known Allergies      MEDICATIONS  (STANDING):  methylPREDNISolone sodium succinate Injectable 40milliGRAM(s) IV Push every 6 hours  azithromycin  IVPB 500milliGRAM(s) IV Intermittent every 24 hours  pantoprazole  Injectable 40milliGRAM(s) IV Push daily  ALBUTerol/ipratropium for Nebulization 3milliLiter(s) Nebulizer every 6 hours  azithromycin  IVPB  IV Intermittent   propofol Infusion 10MICROgram(s)/kG/Min IV Continuous <Continuous>  norepinephrine Infusion 0.3MICROgram(s)/kG/Min IV Continuous <Continuous>  dexmedetomidine Infusion 0.4MICROgram(s)/kG/Hr IV Continuous <Continuous>  influenza   Vaccine 0.5milliLiter(s) IntraMuscular once  enoxaparin Injectable 40milliGRAM(s) SubCutaneous daily  chlorhexidine 0.12% Liquid 15milliLiter(s) Swish and Spit two times a day    MEDICATIONS  (PRN):      Social History:  Cigarettes:                    Alchohol:                 Illicit Drug Abuse:      ROS: Negative other than as mentioned in HPI.    Vital Signs Last 24 Hrs  T(C): 36.6, Max: 37.4 ( @ 16:00)  T(F): 97.9, Max: 99.3 ( @ 16:00)  HR: 61 (56 - 83)  BP: 83/52 (81/52 - 155/86)  BP(mean): 62 (62 - 115)  RR: 20 (19 - 53)  SpO2: 92% (79% - 100%)  ICU Vital Signs Last 24 Hrs  KIMBERLY PALUMBO  I&O's Detail    I&O's Summary    Drug Dosing Weight  KIMBERLY PALUMBO  Mode: AC/ CMV (Assist Control/ Continuous Mandatory Ventilation), RR (machine): 20, TV (machine): 420, FiO2: 60, PEEP: 10, MAP: 15, PIP: 27    PHYSICAL EXAM:  General: Appears well developed, well nourished alert and cooperative.  HEENT: Head; normocephalic, atraumatic.  Eyes: Pupils reactive, cornea wnl.  Neck: Supple, no nodes adenopathy, no NVD or carotid bruit or thyromegaly.  CARDIOVASCULAR: distant S1S2  LUNGS: No rales, rhonchi or wheeze. Normal breath sounds bilaterally.  ABDOMEN: Soft, nontender without mass or organomegaly. bowel sounds normoactive.  EXTREMITIES: No clubbing, cyanosis or edema. Distal pulses wnl.   SKIN: warm and dry with normal turgor.  NEURO: Alert/oriented x 3/normal motor exam. No pathologic reflexes.    PSYCH: normal affect.        LABS:                        12.9   5.85  )-----------( 193      ( 2017 05:05 )             40.5     2017 05:05    136    |  92     |  33.0   ----------------------------<  153    4.5     |  31.0   |  0.97     Ca    8.4        2017 05:05  Phos  3.4       2017 05:05  Mg     2.0       2017 05:05    TPro  6.3    /  Alb  3.4    /  TBili  0.3    /  DBili  x      /  AST  29     /  ALT  43     /  AlkPhos  88     2017 05:05    KIMBERLY PALUMBO  CARDIAC MARKERS ( 2017 19:26 )  x     / <0.01 ng/mL / 547 U/L / x     / 10.1 ng/mL  CARDIAC MARKERS ( 2017 00:16 )  x     / <0.01 ng/mL / 626 U/L / x     / 12.1 ng/mL      PT/INR - ( 2017 05:05 )   PT: 12.4 sec;   INR: 1.13 ratio         PTT - ( 2017 00:16 )  PTT:36.3 sec  Urinalysis Basic - ( 2017 10:10 )    Color: Yellow / Appearance: Clear / S.015 / pH: x  Gluc: x / Ketone: Negative  / Bili: Negative / Urobili: Negative mg/dL   Blood: x / Protein: Negative mg/dL / Nitrite: Negative   Leuk Esterase: Negative / RBC: 11-25 /HPF / WBC 0-2   Sq Epi: x / Non Sq Epi: x / Bacteria: Occasional        RADIOLOGY & ADDITIONAL STUDIES:IMPRESSION:     Cardiomegaly.    Right central line in good position                KB ALFONSO M.D., ATTENDING RADIOLOGIST  This document has been electronically signed. 2017  1:29PM          INTERPRETATION OF TELEMETRY (personally reviewed): NSR    ECG:  NSR no acute changes    ECHO:PHYSICIAN INTERPRETATION:  Left Ventricle:  Left ventricular ejection fraction, by visualestimation, is 55 to 60%.  Right Ventricle: The right ventricle was not well visualized.  Left Atrium: The left atrium was not well visualized.  Pericardium: There is no evidence of pericardial effusion.  Mitral Valve: The mitral valve is not well seen.  Tricuspid Valve: The tricuspid valve is not well seen.  Aortic Valve: Sclerotic aortic valve with normal opening.  Pulmonic Valve: The pulmonic valve was not well visualized.  Aorta: The aortic root is normal in size and structure.  Venous: The pulmonary veins were not well visualized. The inferior vena   cava was normal sized, with respiratory size variation greater than 50%.        Summary:   1. Left ventricular ejection fraction, by visual estimation, is 55 to   60%.   2. Sclerotic aortic valve with normal opening.     MD Kasie Electronically signed on 2017 at 6:29:01 PM

## 2017-01-24 LAB
ANION GAP SERPL CALC-SCNC: 14 MMOL/L — SIGNIFICANT CHANGE UP (ref 5–17)
BUN SERPL-MCNC: 46 MG/DL — HIGH (ref 8–20)
CALCIUM SERPL-MCNC: 8.5 MG/DL — LOW (ref 8.6–10.2)
CHLORIDE SERPL-SCNC: 91 MMOL/L — LOW (ref 98–107)
CO2 SERPL-SCNC: 30 MMOL/L — HIGH (ref 22–29)
CREAT SERPL-MCNC: 1.17 MG/DL — SIGNIFICANT CHANGE UP (ref 0.5–1.3)
GLUCOSE SERPL-MCNC: 151 MG/DL — HIGH (ref 70–115)
HCT VFR BLD CALC: 41.2 % — LOW (ref 42–52)
HGB BLD-MCNC: 13.5 G/DL — LOW (ref 14–18)
MAGNESIUM SERPL-MCNC: 2.4 MG/DL — SIGNIFICANT CHANGE UP (ref 1.8–2.5)
MCHC RBC-ENTMCNC: 28.4 PG — SIGNIFICANT CHANGE UP (ref 27–31)
MCHC RBC-ENTMCNC: 32.8 G/DL — SIGNIFICANT CHANGE UP (ref 32–36)
MCV RBC AUTO: 86.7 FL — SIGNIFICANT CHANGE UP (ref 80–94)
PHOSPHATE SERPL-MCNC: 3.3 MG/DL — SIGNIFICANT CHANGE UP (ref 2.4–4.7)
PLATELET # BLD AUTO: 230 K/UL — SIGNIFICANT CHANGE UP (ref 150–400)
POTASSIUM SERPL-MCNC: 4 MMOL/L — SIGNIFICANT CHANGE UP (ref 3.5–5.3)
POTASSIUM SERPL-SCNC: 4 MMOL/L — SIGNIFICANT CHANGE UP (ref 3.5–5.3)
RBC # BLD: 4.75 M/UL — SIGNIFICANT CHANGE UP (ref 4.6–6.2)
RBC # FLD: 15.6 % — SIGNIFICANT CHANGE UP (ref 11–15.6)
SODIUM SERPL-SCNC: 135 MMOL/L — SIGNIFICANT CHANGE UP (ref 135–145)
WBC # BLD: 8.13 K/UL — SIGNIFICANT CHANGE UP (ref 4.8–10.8)
WBC # FLD AUTO: 8.13 K/UL — SIGNIFICANT CHANGE UP (ref 4.8–10.8)

## 2017-01-24 PROCEDURE — 99291 CRITICAL CARE FIRST HOUR: CPT

## 2017-01-24 PROCEDURE — 71010: CPT | Mod: 26

## 2017-01-24 RX ORDER — VALSARTAN 80 MG/1
80 TABLET ORAL DAILY
Qty: 0 | Refills: 0 | Status: DISCONTINUED | OUTPATIENT
Start: 2017-01-24 | End: 2017-01-24

## 2017-01-24 RX ORDER — SODIUM CHLORIDE 9 MG/ML
500 INJECTION, SOLUTION INTRAVENOUS ONCE
Qty: 0 | Refills: 0 | Status: COMPLETED | OUTPATIENT
Start: 2017-01-24 | End: 2017-01-25

## 2017-01-24 RX ORDER — CEFTRIAXONE 500 MG/1
1 INJECTION, POWDER, FOR SOLUTION INTRAMUSCULAR; INTRAVENOUS EVERY 24 HOURS
Qty: 0 | Refills: 0 | Status: COMPLETED | OUTPATIENT
Start: 2017-01-24 | End: 2017-01-26

## 2017-01-24 RX ORDER — AZITHROMYCIN 500 MG/1
250 TABLET, FILM COATED ORAL DAILY
Qty: 0 | Refills: 0 | Status: DISCONTINUED | OUTPATIENT
Start: 2017-01-24 | End: 2017-01-24

## 2017-01-24 RX ORDER — DOXAZOSIN MESYLATE 4 MG
1 TABLET ORAL AT BEDTIME
Qty: 0 | Refills: 0 | Status: DISCONTINUED | OUTPATIENT
Start: 2017-01-24 | End: 2017-01-29

## 2017-01-24 RX ORDER — VALSARTAN 80 MG/1
80 TABLET ORAL DAILY
Qty: 0 | Refills: 0 | Status: DISCONTINUED | OUTPATIENT
Start: 2017-01-24 | End: 2017-01-29

## 2017-01-24 RX ORDER — AZITHROMYCIN 500 MG/1
250 TABLET, FILM COATED ORAL DAILY
Qty: 0 | Refills: 0 | Status: COMPLETED | OUTPATIENT
Start: 2017-01-24 | End: 2017-01-26

## 2017-01-24 RX ORDER — PANTOPRAZOLE SODIUM 20 MG/1
40 TABLET, DELAYED RELEASE ORAL DAILY
Qty: 0 | Refills: 0 | Status: DISCONTINUED | OUTPATIENT
Start: 2017-01-24 | End: 2017-01-25

## 2017-01-24 RX ADMIN — Medication 40 MILLIGRAM(S): at 00:56

## 2017-01-24 RX ADMIN — Medication 1 MILLIGRAM(S): at 22:30

## 2017-01-24 RX ADMIN — DEXMEDETOMIDINE HYDROCHLORIDE IN 0.9% SODIUM CHLORIDE 13.65 MICROGRAM(S)/KG/HR: 4 INJECTION INTRAVENOUS at 05:35

## 2017-01-24 RX ADMIN — Medication 3 MILLILITER(S): at 02:06

## 2017-01-24 RX ADMIN — PANTOPRAZOLE SODIUM 40 MILLIGRAM(S): 20 TABLET, DELAYED RELEASE ORAL at 18:04

## 2017-01-24 RX ADMIN — DEXMEDETOMIDINE HYDROCHLORIDE IN 0.9% SODIUM CHLORIDE 13.65 MICROGRAM(S)/KG/HR: 4 INJECTION INTRAVENOUS at 00:56

## 2017-01-24 RX ADMIN — CEFTRIAXONE 100 GRAM(S): 500 INJECTION, POWDER, FOR SOLUTION INTRAMUSCULAR; INTRAVENOUS at 12:05

## 2017-01-24 RX ADMIN — Medication 3 MILLILITER(S): at 15:36

## 2017-01-24 RX ADMIN — Medication 40 MILLIGRAM(S): at 05:32

## 2017-01-24 RX ADMIN — AZITHROMYCIN 255 MILLIGRAM(S): 500 TABLET, FILM COATED ORAL at 06:49

## 2017-01-24 RX ADMIN — ENOXAPARIN SODIUM 40 MILLIGRAM(S): 100 INJECTION SUBCUTANEOUS at 12:06

## 2017-01-24 RX ADMIN — Medication 3 MILLILITER(S): at 08:23

## 2017-01-24 RX ADMIN — AZITHROMYCIN 250 MILLIGRAM(S): 500 TABLET, FILM COATED ORAL at 12:05

## 2017-01-24 RX ADMIN — CHLORHEXIDINE GLUCONATE 15 MILLILITER(S): 213 SOLUTION TOPICAL at 18:03

## 2017-01-24 RX ADMIN — CHLORHEXIDINE GLUCONATE 15 MILLILITER(S): 213 SOLUTION TOPICAL at 05:32

## 2017-01-24 RX ADMIN — SODIUM CHLORIDE 1000 MILLILITER(S): 9 INJECTION, SOLUTION INTRAVENOUS at 00:30

## 2017-01-24 RX ADMIN — Medication 3 MILLILITER(S): at 21:29

## 2017-01-24 NOTE — PROGRESS NOTE ADULT - ASSESSMENT
61 year old male with acute hypercarbic and hypoxic respiratory failure, COPD exacerbation, r/o atypical PNA, AMS

## 2017-01-24 NOTE — PROGRESS NOTE ADULT - ASSESSMENT
61M a/w hypoxic and hypercapnic respiratory failure requiring ventilatory support, likely COPD acute on chronic exacerbation.

## 2017-01-24 NOTE — PROGRESS NOTE ADULT - SUBJECTIVE AND OBJECTIVE BOX
Patient is a 61y old  Male who presents with a chief complaint of difficulty breathing (2017 07:20)      BRIEF HOSPITAL COURSE: 61 YOM h/o HTN, recently dx COPD who was admitted with hypercarbic respiratory failure.  He initially failed a trial of bipap and was intubated yesterday morning.  After intubation he started to developed ARDS conditions and required maximal ventilatory settings with 100% Fi02 and 15 PEEP.  A rotoprone bed was called for, but within an hour the pt's condition began to improve and we were able to wean his vent settings.      Events last 24 hours: Continued VDRF with decreasing ventilator requirements.  Now with 50% Fio2 and  5cm peep.  Lightly sedated on precedex gtt.  Tolerating vent well.    PAST MEDICAL & SURGICAL HISTORY:  HTN (hypertension)  No significant past surgical history      Review of Systems:  Unable to access on vent.      Medications:  azithromycin  IVPB 500milliGRAM(s) IV Intermittent every 24 hours  azithromycin  IVPB  IV Intermittent   doxazosin 1milliGRAM(s) Oral at bedtime  ALBUTerol/ipratropium for Nebulization 3milliLiter(s) Nebulizer every 6 hours  dexmedetomidine Infusion 0.4MICROgram(s)/kG/Hr IV Continuous <Continuous>  enoxaparin Injectable 40milliGRAM(s) SubCutaneous daily  pantoprazole  Injectable 40milliGRAM(s) IV Push daily  methylPREDNISolone sodium succinate Injectable 40milliGRAM(s) IV Push every 6 hours  influenza   Vaccine 0.5milliLiter(s) IntraMuscular once  chlorhexidine 0.12% Liquid 15milliLiter(s) Swish and Spit two times a day      Mode: AC/ CMV (Assist Control/ Continuous Mandatory Ventilation)  RR (machine): 20  TV (machine): 420  FiO2: 50  PEEP: 10  MAP: 16  PIP: 30      ICU Vital Signs Last 24 Hrs  T(C): 37.3, Max: 99.3 ( @ 16:00)  T(F): 99.1, Max: 210.7 ( @ 16:00)  HR: 72 (56 - 76)  ABP: 125/76 (89/57 - 125/76)  ABP(mean): 97 (71 - 97)  RR: 28 (16 - 44)  SpO2: 92% (88% - 96%)      ABG - ( 2017 16:16 )  pH: 7.39  /  pCO2: 57    /  pO2: 60    / HCO3: 31    / Base Excess: 7.6   /  SaO2: 92            I&O's Detail   183.6 / 715  ([id number])        LABS:                        12.9   5.85  )-----------( 193      ( 2017 05:05 )             40.5     2017 05:05    136    |  92     |  33.0   ----------------------------<  153    4.5     |  31.0   |  0.97     Ca    8.4        2017 05:05  Phos  3.4       2017 05:05  Mg     2.0       2017 05:05    TPro  6.3    /  Alb  3.4    /  TBili  0.3    /  DBili  x      /  AST  29     /  ALT  43     /  AlkPhos  88     2017 05:05      CARDIAC MARKERS ( 2017 19:26 )  x     / <0.01 ng/mL / 547 U/L / x     / 10.1 ng/mL      PT/INR - ( 2017 05:05 )   PT: 12.4 sec;   INR: 1.13 ratio         Urinalysis Basic - ( 2017 10:10 )    Color: Yellow / Appearance: Clear / S.015 / pH: x  Gluc: x / Ketone: Negative  / Bili: Negative / Urobili: Negative mg/dL   Blood: x / Protein: Negative mg/dL / Nitrite: Negative   Leuk Esterase: Negative / RBC: 11-25 /HPF / WBC 0-2   Sq Epi: x / Non Sq Epi: x / Bacteria: Occasional      CULTURES:    Rapid RVP Result: NotDetec ( @ 07:16)      Physical Examination:    General: No acute distress.  Lightly sedated,  interactive, nonfocal, RASS 0     HEENT: Pupils equal, reactive to light.  Symmetric.    PULM: Decreased BS B/L at bases, fine rhonchi anteriorly B/L, No Wheeze or rales, no significant sputum production    CVS: Regular rate and rhythm, no murmurs, rubs, or gallops    ABD: Soft, Obease, nondistended, nontender, normoactive bowel sounds, no masses    EXT: No edema, nontender    SKIN: Warm and well perfused, no rashes noted.    RADIOLOGY: ***  EXAM:  CHEST SINGLE VIEW FRONTAL                          PROCEDURE DATE:  2017        INTERPRETATION:  HISTORY: Triple lumen catheter placement  TECHNIQUE: Portable frontal view of the chest, 1 view.  COMPARISON: Earlier today.  FINDINGS:   Right -sided central line is in the region of the superior vena cava. The   endotracheal tube is above the gary. The nasogastric tube courses below   the left hemidiaphragm, tip off edge of film.  HEART:  Enlarged  LUNGS: free of consolidation or effusion.    Osseous structures are within normal limits.    IMPRESSION:     Cardiomegaly.    Right central line in good position    CRITICAL CARE TIME SPENT: 40 minutes

## 2017-01-24 NOTE — PROGRESS NOTE ADULT - SUBJECTIVE AND OBJECTIVE BOX
No Known Allergies                                                             Code Status:FULL    KIMBERLY PALUMBO Patient is a 61y old  Male who presents with a chief complaint of difficulty breathing (22 Jan 2017 07:20)      BRIEF HOSPITAL COURSE: admitted with hypercapnic respiratory failure and hypoxemia, requiring ventilatory support (1/22), developed severe ARDS now mild, and shock state (resolved)    Events last 24 hours: remains intubated on vent, hypoxia with PS 10/5 FiO2 60%  Review of systems:     communicates via writing, has no concerns or complaints. pain free at this moment. had questions about returning to work and about using a machine at night.       PAST MEDICAL & SURGICAL HISTORY:  HTN (hypertension)  No significant past surgical history        Medications:  azithromycin  IVPB 250milliGRAM(s) IV Intermittent daily  cefTRIAXone   IVPB 1Gram(s) IV Intermittent every 24 hours    doxazosin 1milliGRAM(s) Oral at bedtime  valsartan 80milliGRAM(s) Oral daily    ALBUTerol/ipratropium for Nebulization 3milliLiter(s) Nebulizer every 6 hours    dexmedetomidine Infusion 0.4MICROgram(s)/kG/Hr IV Continuous <Continuous>      enoxaparin Injectable 40milliGRAM(s) SubCutaneous daily    pantoprazole   Suspension 40milliGRAM(s) Oral daily          influenza   Vaccine 0.5milliLiter(s) IntraMuscular once    chlorhexidine 0.12% Liquid 15milliLiter(s) Swish and Spit two times a day        Mode: BiLevel/ BiVent  FiO2: 60  MAP: 20  P-High: 22  P-Low: 0  T-High: 5.5  T-Low: 0.5  PIP: 25      Adult Advanced Hemodynamics Last 24 Hrs  CVP(mm Hg): --  CVP(cm H2O): --  CO: --  CI: --  PA: --  PA(mean): --  PCWP: --  SVR: --  SVRI: --  PVR: --  PVRI: --      ICU Vital Signs Last 24 Hrs  T(C): 37.1, Max: 99.3 (01-23 @ 16:00)  T(F): 98.8, Max: 210.7 (01-23 @ 16:00)  HR: 72 (56 - 75)  BP: --  BP(mean): --  ABP: 100/60 (89/57 - 136/75)  ABP(mean): 87 (71 - 97)  RR: 23 (16 - 44)  SpO2: 90% (88% - 94%)    CAPILLARY BLOOD GLUCOSE            LABS:  CBC Full  -  ( 24 Jan 2017 05:16 )  WBC Count : 8.13 K/uL  Hemoglobin : 13.5 g/dL  Hematocrit : 41.2 %  Platelet Count - Automated : 230 K/uL  Mean Cell Volume : 86.7 fl  Mean Cell Hemoglobin : 28.4 pg  Mean Cell Hemoglobin Concentration : 32.8 g/dL  Auto Neutrophil # : x  Auto Lymphocyte # : x  Auto Monocyte # : x  Auto Eosinophil # : x  Auto Basophil # : x  Auto Neutrophil % : x  Auto Lymphocyte % : x  Auto Monocyte % : x  Auto Eosinophil % : x  Auto Basophil % : x    24 Jan 2017 05:16    135    |  91     |  46.0   ----------------------------<  151    4.0     |  30.0   |  1.17     Ca    8.5        24 Jan 2017 05:16  Phos  3.3       24 Jan 2017 05:16  Mg     2.4       24 Jan 2017 05:16    TPro  6.3    /  Alb  3.4    /  TBili  0.3    /  DBili  x      /  AST  29     /  ALT  43     /  AlkPhos  88     23 Jan 2017 05:05      CARDIAC MARKERS ( 22 Jan 2017 19:26 )  x     / <0.01 ng/mL / 547 U/L / x     / 10.1 ng/mL     D-Dimer Assay, Quantitative: 464 ng/mL DDU (01-22 @ 00:16)    PT/INR - ( 23 Jan 2017 05:05 )   PT: 12.4 sec;   INR: 1.13 ratio                 CULTURES:  Rapid RVP Result: NotDetec (01-22 @ 07:16)      Physical Examination:    General: No acute distress.  Alert, oriented, interactive. Follows simple instructions    HEENT: Atraumatic, MMM, Pupils equal, reactive to light.  Symmetric. ETT/OGT    PULM: reduced at bases bilaterally,     CVS: Regular rate and rhythm, no murmurs, rubs; S1/S2. No JVD/HJR    ABD: Soft, nondistended, nontender, normoactive bowel sounds, no masses    EXT: 1+ pitting dependent edema, nontender. Distal pulses 2+ equal bilaterally    SKIN: Warm and well perfused, no rashes noted.    Neuro: nonfocal, moves all extremities.     RADIOLOGY:     FINDINGS /   IMPRESSION:     There is no change in position of endotracheal tube which is just below   clavicles pointing towards right mainstem bronchus. There is shallow   inspiration without acute consolidation. Cardiac and mediastinal   structures cannot be assessed due to portable technique. Overall, exam is   stable.       CRITICAL CARE TIME SPENT: 51 minutes

## 2017-01-25 LAB
BASE EXCESS BLDA CALC-SCNC: 10.2 MMOL/L — HIGH (ref -3–3)
BLOOD GAS COMMENTS ARTERIAL: SIGNIFICANT CHANGE UP
GAS PNL BLDA: SIGNIFICANT CHANGE UP
HCO3 BLDA-SCNC: 34 MMOL/L — HIGH (ref 20–26)
HOROWITZ INDEX BLDA+IHG-RTO: 40 — SIGNIFICANT CHANGE UP
PCO2 BLDA: 49 MMHG — HIGH (ref 35–45)
PH BLDA: 7.47 — HIGH (ref 7.35–7.45)
PO2 BLDA: 79 MMHG — LOW (ref 83–108)
SAO2 % BLDA: 97 % — SIGNIFICANT CHANGE UP (ref 95–99)

## 2017-01-25 PROCEDURE — 99233 SBSQ HOSP IP/OBS HIGH 50: CPT

## 2017-01-25 RX ORDER — SODIUM CHLORIDE 9 MG/ML
500 INJECTION, SOLUTION INTRAVENOUS ONCE
Qty: 0 | Refills: 0 | Status: COMPLETED | OUTPATIENT
Start: 2017-01-25 | End: 2017-01-24

## 2017-01-25 RX ORDER — SODIUM CHLORIDE 9 MG/ML
500 INJECTION, SOLUTION INTRAVENOUS ONCE
Qty: 0 | Refills: 0 | Status: COMPLETED | OUTPATIENT
Start: 2017-01-25 | End: 2017-01-25

## 2017-01-25 RX ADMIN — CHLORHEXIDINE GLUCONATE 15 MILLILITER(S): 213 SOLUTION TOPICAL at 05:32

## 2017-01-25 RX ADMIN — AZITHROMYCIN 250 MILLIGRAM(S): 500 TABLET, FILM COATED ORAL at 14:39

## 2017-01-25 RX ADMIN — SODIUM CHLORIDE 1000 MILLILITER(S): 9 INJECTION, SOLUTION INTRAVENOUS at 04:27

## 2017-01-25 RX ADMIN — Medication 3 MILLILITER(S): at 15:23

## 2017-01-25 RX ADMIN — CEFTRIAXONE 100 GRAM(S): 500 INJECTION, POWDER, FOR SOLUTION INTRAMUSCULAR; INTRAVENOUS at 14:39

## 2017-01-25 RX ADMIN — ENOXAPARIN SODIUM 40 MILLIGRAM(S): 100 INJECTION SUBCUTANEOUS at 14:39

## 2017-01-25 RX ADMIN — Medication 3 MILLILITER(S): at 08:17

## 2017-01-25 RX ADMIN — VALSARTAN 80 MILLIGRAM(S): 80 TABLET ORAL at 05:32

## 2017-01-25 RX ADMIN — SODIUM CHLORIDE 1000 MILLILITER(S): 9 INJECTION, SOLUTION INTRAVENOUS at 03:00

## 2017-01-25 RX ADMIN — SODIUM CHLORIDE 1000 MILLILITER(S): 9 INJECTION, SOLUTION INTRAVENOUS at 01:26

## 2017-01-25 RX ADMIN — Medication 3 MILLILITER(S): at 21:30

## 2017-01-25 RX ADMIN — Medication 3 MILLILITER(S): at 02:57

## 2017-01-25 RX ADMIN — Medication 1 MILLIGRAM(S): at 22:38

## 2017-01-25 NOTE — PHYSICAL THERAPY INITIAL EVALUATION ADULT - ACTIVE RANGE OF MOTION EXAMINATION, REHAB EVAL
bilateral shoulder flexion limited to approximately 45 degrees/no Active ROM deficits were identified/deficits as listed below

## 2017-01-25 NOTE — PHYSICAL THERAPY INITIAL EVALUATION ADULT - GENERAL OBSERVATIONS, REHAB EVAL
Pt received seating in bed side chair, (+)cardiac monitor, (+)BIPAP, (+)A-line, (+)IV, wife present at bedside, pt agreeable to evaluation

## 2017-01-25 NOTE — PROGRESS NOTE ADULT - SUBJECTIVE AND OBJECTIVE BOX
No Known Allergies                                                             Code Status:FULL  KIMBERLY PALUMBO Patient is a 61y old  Male who presents with a chief complaint of difficulty breathing (22 Jan 2017 07:20)      BRIEF HOSPITAL COURSE: admitted with hypercapnic respiratory failure and hypoxemia, requiring ventilatory support (1/22), developed severe ARDS now mild, and shock state (resolved)    Events last 24 hours: extubated @ ~0830 1/25 to NIV. currently tolerating well, out of bed, tolerating PO.     Review of systems:   no complaints or concerns at this time.     PAST MEDICAL & SURGICAL HISTORY:  HTN (hypertension)  No significant past surgical history        Medications:  azithromycin  IVPB 250milliGRAM(s) IV Intermittent daily  cefTRIAXone   IVPB 1Gram(s) IV Intermittent every 24 hours    doxazosin 1milliGRAM(s) Oral at bedtime  valsartan 80milliGRAM(s) Oral daily    ALBUTerol/ipratropium for Nebulization 3milliLiter(s) Nebulizer every 6 hours        enoxaparin Injectable 40milliGRAM(s) SubCutaneous daily            influenza   Vaccine 0.5milliLiter(s) IntraMuscular once          Mode: CPAP with PS  FiO2: 40  PEEP: 5  PS: 5  MAP: 7.8      Adult Advanced Hemodynamics Last 24 Hrs  CVP(mm Hg): --  CVP(cm H2O): --  CO: --  CI: --  PA: --  PA(mean): --  PCWP: --  SVR: --  SVRI: --  PVR: --  PVRI: --      ICU Vital Signs Last 24 Hrs  T(C): 36.6, Max: 37.9 (01-24 @ 16:00)  T(F): 97.9, Max: 100.2 (01-24 @ 16:00)  HR: 74 (69 - 90)  BP: 139/65 (82/53 - 139/65)  BP(mean): 79 (63 - 96)  ABP: 92/53 (68/45 - 107/73)  ABP(mean): 88 (55 - 88)  RR: 16 (0 - 25)  SpO2: 94% (89% - 100%)    CAPILLARY BLOOD GLUCOSE            LABS:  CBC Full  -  ( 24 Jan 2017 05:16 )  WBC Count : 8.13 K/uL  Hemoglobin : 13.5 g/dL  Hematocrit : 41.2 %  Platelet Count - Automated : 230 K/uL  Mean Cell Volume : 86.7 fl  Mean Cell Hemoglobin : 28.4 pg  Mean Cell Hemoglobin Concentration : 32.8 g/dL  Auto Neutrophil # : x  Auto Lymphocyte # : x  Auto Monocyte # : x  Auto Eosinophil # : x  Auto Basophil # : x  Auto Neutrophil % : x  Auto Lymphocyte % : x  Auto Monocyte % : x  Auto Eosinophil % : x  Auto Basophil % : x    24 Jan 2017 05:16    135    |  91     |  46.0   ----------------------------<  151    4.0     |  30.0   |  1.17     Ca    8.5        24 Jan 2017 05:16  Phos  3.3       24 Jan 2017 05:16  Mg     2.4       24 Jan 2017 05:16                     CULTURES:  Rapid RVP Result: NotDetec (01-22 @ 07:16)  Culture Results:   No growth at 48 hours (01-22 @ 00:38)  Culture Results:   No growth at 48 hours (01-22 @ 00:17)      Physical Examination:    General: No acute distress.  Alert, oriented, interactive. Follows simple instructions    HEENT: Atraumatic, MMM, Pupils equal, reactive to light.  Symmetric.    PULM: reduced at bases bilaterally,     CVS: Regular rate and rhythm, no murmurs, rubs; S1/S2. No JVD/HJR    ABD: Soft, nondistended, nontender, normoactive bowel sounds, no masses    EXT: 1+ pitting dependent edema, nontender. Distal pulses 2+ equal bilaterally    SKIN: Warm and well perfused, no rashes noted.    Neuro: nonfocal, moves all extremities.     RADIOLOGY:     CRITICAL CARE TIME SPENT: 42 minutes

## 2017-01-25 NOTE — PHYSICAL THERAPY INITIAL EVALUATION ADULT - LIVES WITH, PROFILE
spouse/Lives in private home 3 steps to enter with bilateral rails, 12 steps to second floor with right rail going up. Pt can stay on first floor and has bathroom on both levels.

## 2017-01-25 NOTE — PHYSICAL THERAPY INITIAL EVALUATION ADULT - PERTINENT HX OF CURRENT PROBLEM, REHAB EVAL
60 yo male s/p fall (+)hit head with scalp lac, (-)LOC. Pt dx with acute resp failure with hyposia and hypercapnia and COPD exac. Developed severe ARDS requiring vent support. Extubated 1/25/16 AM

## 2017-01-25 NOTE — PROGRESS NOTE ADULT - SUBJECTIVE AND OBJECTIVE BOX
[place], THE HEART CENTER                                   [address] [place] [address]                                                      PHONE: [phone]                                                         FAX: [phone]  [email or web address]  ---------------------------------------------------------------------------------------------------------------------------------    Overnight events/patient complaints:  NAD feeling better after extubations     No Known Allergies    MEDICATIONS  (STANDING):  ALBUTerol/ipratropium for Nebulization 3milliLiter(s) Nebulizer every 6 hours  influenza   Vaccine 0.5milliLiter(s) IntraMuscular once  enoxaparin Injectable 40milliGRAM(s) SubCutaneous daily  doxazosin 1milliGRAM(s) Oral at bedtime  valsartan 80milliGRAM(s) Oral daily  azithromycin  IVPB 250milliGRAM(s) IV Intermittent daily  cefTRIAXone   IVPB 1Gram(s) IV Intermittent every 24 hours    MEDICATIONS  (PRN):      Vital Signs Last 24 Hrs  T(C): 36.6, Max: 37.9 (01-24 @ 16:00)  T(F): 97.9, Max: 100.2 (01-24 @ 16:00)  HR: 74 (69 - 90)  BP: 139/65 (82/53 - 139/65)  BP(mean): 79 (63 - 96)  RR: 16 (0 - 25)  SpO2: 94% (89% - 100%)  ICU Vital Signs Last 24 Hrs  KIMBERLY PALUMBO  I&O's Detail    I&O's Summary    Drug Dosing Weight  KIMBERLY PALUMBO  Mode: CPAP with PS, FiO2: 40, PEEP: 5, PS: 5, MAP: 7.8    PHYSICAL EXAM:  General: Appears well developed, well nourished alert and cooperative.  HEENT: Head; normocephalic, atraumatic.  Eyes: Pupils reactive, cornea wnl.  Neck: Supple, no nodes adenopathy, no NVD or carotid bruit or thyromegaly.  CARDIOVASCULAR: Normal S1 and S2, 3/6 murmur, rub, gallop or lift.   LUNGS: No rales, rhonchi or wheeze. Normal breath sounds bilaterally.  ABDOMEN: Soft, nontender without mass or organomegaly. bowel sounds normoactive.  EXTREMITIES: No clubbing, cyanosis or edema. Distal pulses wnl.   SKIN: warm and dry with normal turgor.  NEURO: Alert/oriented x 3/normal motor exam. No pathologic reflexes.    PSYCH: normal affect.        LABS:                        13.5   8.13  )-----------( 230      ( 24 Jan 2017 05:16 )             41.2     24 Jan 2017 05:16    135    |  91     |  46.0   ----------------------------<  151    4.0     |  30.0   |  1.17     Ca    8.5        24 Jan 2017 05:16  Phos  3.3       24 Jan 2017 05:16  Mg     2.4       24 Jan 2017 05:16      KIMBERLY PALUMBO            RADIOLOGY & ADDITIONAL STUDIES:    INTERPRETATION OF TELEMETRY (personally reviewed):        ECHO:  Left Ventricle:  Left ventricular ejection fraction, by visualestimation, is 55 to 60%.  Right Ventricle: The right ventricle was not well visualized.  Left Atrium: The left atrium was not well visualized.  Pericardium: There is no evidence of pericardial effusion.  Mitral Valve: The mitral valve is not well seen.  Tricuspid Valve: The tricuspid valve is not well seen.  Aortic Valve: Sclerotic aortic valve with normal opening.  Pulmonic Valve: The pulmonic valve was not well visualized.  Aorta: The aortic root is normal in size and structure.  Venous: The pulmonary veins were not well visualized. The inferior vena   cava was normal sized, with respiratory size variation greater than 50%.        Summary:   1. Left ventricular ejection fraction, by visual estimation, is 55 to   60%.   2. Sclerotic aortic valve with normal opening.          STRESS TEST: pending out Patient  cardiac PET       ASSESSMENT AND PLAN:    In summary, KIMBERLY PALUMBO is an 61y Male with past medical history significant for ex smoker MARY likely OHS HTN A/W with bronchitis resp failure now s/p extubation; no evidence of heart failure and negative for MI; out patient ischemic work up such as cardiac PET CT; care as per ICU team

## 2017-01-26 PROCEDURE — 99233 SBSQ HOSP IP/OBS HIGH 50: CPT

## 2017-01-26 RX ORDER — BUDESONIDE, MICRONIZED 100 %
0.5 POWDER (GRAM) MISCELLANEOUS
Qty: 0 | Refills: 0 | Status: DISCONTINUED | OUTPATIENT
Start: 2017-01-26 | End: 2017-01-29

## 2017-01-26 RX ADMIN — ENOXAPARIN SODIUM 40 MILLIGRAM(S): 100 INJECTION SUBCUTANEOUS at 14:16

## 2017-01-26 RX ADMIN — AZITHROMYCIN 250 MILLIGRAM(S): 500 TABLET, FILM COATED ORAL at 12:30

## 2017-01-26 RX ADMIN — CEFTRIAXONE 100 GRAM(S): 500 INJECTION, POWDER, FOR SOLUTION INTRAMUSCULAR; INTRAVENOUS at 15:37

## 2017-01-26 RX ADMIN — Medication 3 MILLILITER(S): at 15:00

## 2017-01-26 RX ADMIN — Medication 1 MILLIGRAM(S): at 21:26

## 2017-01-26 RX ADMIN — Medication 3 MILLILITER(S): at 04:52

## 2017-01-26 RX ADMIN — Medication 0.5 MILLIGRAM(S): at 20:50

## 2017-01-26 RX ADMIN — VALSARTAN 80 MILLIGRAM(S): 80 TABLET ORAL at 06:54

## 2017-01-26 RX ADMIN — Medication 3 MILLILITER(S): at 09:22

## 2017-01-26 RX ADMIN — Medication 3 MILLILITER(S): at 20:50

## 2017-01-26 NOTE — CONSULT NOTE ADULT - ASSESSMENT
Patient with acute on chronic respiratory likely with primary issue being OHS and MARY untreated.  Possible element of COPD but currently without wheeze, cough and no known prodrome.    Currently tolerating spontaneous respirations with nasal O2.  No obvious cardiac disease.      Plan:  Add budesonide to nebs empirically  Continue nocturnal BIPAP>change settings to increased IPAP and EPAP  Will need outpatient evaluation with PFT's and sleep study  Will need home BIPAP with empiric settings for hypercarbic respiratory failure.

## 2017-01-26 NOTE — PROGRESS NOTE ADULT - ASSESSMENT
61M a/w hypoxic and hypercapnic respiratory failure required ventilatory support, s/p extubated day 1, likely from COPD acute on chronic exacerbation.

## 2017-01-26 NOTE — CHART NOTE - NSCHARTNOTEFT_GEN_A_CORE
Critical Care PA - SABRINA    R Axillary [place] easily removed.  Manual pressure held, No bleeding, Sterile dressing applied     R IJ TLC easily removed as well.  Manual pressure held, No bleeding noted and sterile dressing appiled.    Pt tolerated both well.  Pt to remain supine for 20 minutes.  Dressings to remain C/D/I X 48 hours

## 2017-01-26 NOTE — CONSULT NOTE ADULT - SUBJECTIVE AND OBJECTIVE BOX
PULMONARY CONSULT NOTE      KIMBERLY PALUMBON-[id number]    Patient is a 61y old  Male who presents with a chief complaint of difficulty breathing (22 Jan 2017 07:20)  Patient without known pulmonary history presents s/p fall without LOC, AMS found to have severe acute hypercapnia with evidence of a chronic component.  Failed BIPAP and subsequently intubated.  Treated with antibiotics, brief steroids and bronchodilators and subsequently extubated yesterday.  Currently on nasal O2 in no distress.  Denies prodrome but as per wife he has been having increased dyspnea and fatigue "for a while".  Denies cough, wheeze nor chest pain.  Still works 2 jobs.  Reports 60 pound weight gain over last 10 years.  Ex-smoker at least 1 ppd until age 41.  Family history of COPD.  Prehospital issues only related to hypertension.  Wife reports severe snoring.      INTERVAL HPI/OVERNIGHT EVENTS:    MEDICATIONS  (STANDING):  ALBUTerol/ipratropium for Nebulization 3milliLiter(s) Nebulizer every 6 hours  influenza   Vaccine 0.5milliLiter(s) IntraMuscular once  enoxaparin Injectable 40milliGRAM(s) SubCutaneous daily  doxazosin 1milliGRAM(s) Oral at bedtime  valsartan 80milliGRAM(s) Oral daily  azithromycin  IVPB 250milliGRAM(s) IV Intermittent daily  cefTRIAXone   IVPB 1Gram(s) IV Intermittent every 24 hours  buDESOnide   0.5 milliGRAM(s) Respule 0.5milliGRAM(s) Inhalation two times a day      MEDICATIONS  (PRN):      Allergies    No Known Allergies    Intolerances        PAST MEDICAL & SURGICAL HISTORY:  HTN (hypertension)  No significant past surgical history      FAMILY HISTORY:  Per HPI       SOCIAL HISTORY  Smoking History: Per HPI    REVIEW OF SYSTEMS:    CONSTITUTIONAL:  As per HPI.    HEENT:  Eyes:  No diplopia or blurred vision. ENT:  No earache, sore throat or runny nose.    CARDIOVASCULAR:  No pressure, squeezing, tightness, heaviness or aching about the chest; no palpitations.    RESPIRATORY:  As per HPI    GASTROINTESTINAL:  No nausea, vomiting or diarrhea.    GENITOURINARY:  No dysuria, frequency or urgency.    MUSCULOSKELETAL:  No joint pains    SKIN:  No new lesions.    NEUROLOGIC:  No paresthesias, fasciculations, seizures or weakness.    PSYCHIATRIC:  No disorder of thought or mood.    ENDOCRINE:  No heat or cold intolerance, polyuria or polydipsia.    HEMATOLOGICAL:  No easy bruising or bleeding.     Vital Signs Last 24 Hrs  T(C): 37.4, Max: 37.7 (01-25 @ 20:00)  T(F): 99.4, Max: 99.8 (01-25 @ 20:00)  HR: 79 (67 - 92)  BP: 94/57 (83/53 - 166/67)  BP(mean): 70 (64 - 106)  RR: 21 (17 - 29)  SpO2: 94% (89% - 100%)    PHYSICAL EXAMINATION:    GENERAL: The patient is a well-developed, well-nourished _____in no apparent distress.     HEENT: Head is normocephalic and atraumatic. Extraocular muscles are intact. Mucous membranes are moist.     NECK: Supple.     LUNGS: Clear to auscultation without wheezing, rales, or rhonchi. Respirations unlabored    HEART: Regular rate and rhythm without murmur.    ABDOMEN: Soft, nontender, and nondistended.  No hepatosplenomegaly is noted.Obese.    EXTREMITIES: Without any cyanosis, clubbing, rash, lesions; +edema.    NEUROLOGIC: Grossly intact.    SKIN: No ulceration or induration present.      LABS:  Elevated bicarbs  Repeat studies pending.             ABG - ( 25 Jan 2017 11:21 )  pH: 7.47  /  pCO2: 49    /  pO2: 79    / HCO3: 34    / Base Excess: 10.2  /  SaO2: 97    on vent                          MICROBIOLOGY:    RADIOLOGY & ADDITIONAL STUDIES:  There is no change in position of endotracheal tube which is just below   clavicles pointing towards right mainstem bronchus. There is shallow   inspiration without acute consolidation. Cardiac and mediastinal   structures cannot be assessed due to portable technique. Overall, exam is   stable.

## 2017-01-26 NOTE — PROGRESS NOTE ADULT - ASSESSMENT
1. Middleaged male adm with resp failure co2 retention and resp acidosis requiring fjepjaynfd-mlxczbfreklid-Qdbten obesity/recent uri/copd/woody. Thus far no cardiac source of dysnpnea found.

## 2017-01-26 NOTE — PROGRESS NOTE ADULT - SUBJECTIVE AND OBJECTIVE BOX
Chief Complaint:chart hx reviewed.    HPI: 62 yo male with progress sob-adm with co2 retention and resp acidosis and failure-needed intubation. PMH remote heavy smoker/morbid obesity/woody/told of copd. Denies cardiac hx/cp/dm. No surgery or allergy. hx of hpt. Was treated as op in Dec with steroids and antibiotics for "pneumonia"    PAST MEDICAL & SURGICAL HISTORY:  HTN (hypertension)  No significant past surgical history      PREVIOUS DIAGNOSTIC TESTING:      ECHO  FINDINGS: TDS LVEF 55%/  cta-TDS no central PE.  STRESS  FINDINGS:    CATHETERIZATION  FINDINGS:    MEDICATIONS  (STANDING):  ALBUTerol/ipratropium for Nebulization 3milliLiter(s) Nebulizer every 6 hours  influenza   Vaccine 0.5milliLiter(s) IntraMuscular once  enoxaparin Injectable 40milliGRAM(s) SubCutaneous daily  doxazosin 1milliGRAM(s) Oral at bedtime  valsartan 80milliGRAM(s) Oral daily  azithromycin  IVPB 250milliGRAM(s) IV Intermittent daily  cefTRIAXone   IVPB 1Gram(s) IV Intermittent every 24 hours    MEDICATIONS  (PRN):      FAMILY HISTORY:  No pertinent family history in first degree relatives      ROS: Negative other than as mentioned in HPI.    Vital Signs Last 24 Hrs  T(C): 37.4, Max: 37.7 (01-25 @ 20:00)  T(F): 99.4, Max: 99.8 (01-25 @ 20:00)  HR: 79 (67 - 92)  BP: 94/57 (83/53 - 166/67)  BP(mean): 70 (64 - 106)  RR: 21 (17 - 29)  SpO2: 94% (89% - 100%)    PHYSICAL EXAM:  General: Appears well developed, well nourished alert and cooperative. morbidly obese (270lbs at 5'6")  HEENT: Head; normocephalic, atraumatic.  Eyes;   Pupils reactive, cornea wnl.  Neck; Supple, no nodes adenopathy, no NVD or carotid bruit or thyromegaly.  CARDIOVASCULAR; No murmur, rub, gallop or lift. Normal S1 and S2.  LUNGS; No rales, rhonchi or wheeze. Normal breath sounds bilaterally.  ABDOMEN ; Soft, nontender without mass or organomegaly. bowel sounds normoactive. morbidly obese  EXTREMITIES; No clubbing, cyanosis or edema. Distal pulses wnl. ROM normal.  SKIN; warm and dry with normal turgor.  NEURO; Alert/oriented x 3/normal motor exam. No pathologic reflexes.    PSYCH; normal affect.            INTERPRETATION OF TELEMETRY:    ECG:monitor SR    I&O's Detail      LABS:    cxr on adm-poor isnpiratory effort apical lordotic.  nl cbc/creat/trop blood cuotures neg.            I&O's Summary      RADIOLOGY & ADDITIONAL STUDIES:

## 2017-01-26 NOTE — PROGRESS NOTE ADULT - SUBJECTIVE AND OBJECTIVE BOX
KIMBERLY PALUMBO    [id number]    61y      Male    INTERVAL HPI/OVERNIGHT EVENTS:  Patient is doing better, SOB is improving, patient got intubated yesterday am, has been on 2 liters of supplemental oxygen with nasal cannula, has no fever, chills, chest pain, nausea, vomiting, dizziness.       REVIEW OF SYSTEMS:    CONSTITUTIONAL: No fever, no fatigue  RESPIRATORY: No cough, wheezing, hemoptysis; No shortness of breath  CARDIOVASCULAR: No chest pain, palpitations  GASTROINTESTINAL: No abdominal or epigastric pain. No nausea, vomiting  NEUROLOGICAL: No headaches, memory loss, loss of strength.  MISCELLANEOUS: no joint swelling or tenderness.      Vital Signs Last 24 Hrs  T(C): 37.4, Max: 37.7 (01-25 @ 20:00)  T(F): 99.4, Max: 99.8 (01-25 @ 20:00)  HR: 75 (67 - 91)  BP: 130/78 (83/53 - 166/67)  BP(mean): 70 (64 - 106)  RR: 21 (17 - 29)  SpO2: 94% (89% - 100%)    PHYSICAL EXAM:    GENERAL: Middle age male looking comfortable  HEENT: PERRL, +EOMI  NECK: soft, Supple, No JVD,   CHEST/LUNG: decrease air entry b/l  HEART: S1S2+, Regular rate and rhythm; No murmurs.   ABDOMEN: Soft, Nontender, Nondistended; Bowel sounds present  EXTREMITIES:  2+ Peripheral Pulses, No clubbing, cyanosis, or edema  SKIN: No rashes or lesions  NEURO: AAOX3, no focal deficits, no motor r sensory loss  PSYCH: normal mood      LABS:      MEDICATIONS  (STANDING):  ALBUTerol/ipratropium for Nebulization 3milliLiter(s) Nebulizer every 6 hours  influenza   Vaccine 0.5milliLiter(s) IntraMuscular once  enoxaparin Injectable 40milliGRAM(s) SubCutaneous daily  doxazosin 1milliGRAM(s) Oral at bedtime  valsartan 80milliGRAM(s) Oral daily  buDESOnide   0.5 milliGRAM(s) Respule 0.5milliGRAM(s) Inhalation two times a day    MEDICATIONS  (PRN):      RADIOLOGY & ADDITIONAL TESTS:

## 2017-01-27 LAB
CULTURE RESULTS: SIGNIFICANT CHANGE UP
CULTURE RESULTS: SIGNIFICANT CHANGE UP
SPECIMEN SOURCE: SIGNIFICANT CHANGE UP
SPECIMEN SOURCE: SIGNIFICANT CHANGE UP

## 2017-01-27 PROCEDURE — 99232 SBSQ HOSP IP/OBS MODERATE 35: CPT

## 2017-01-27 RX ADMIN — Medication 3 MILLILITER(S): at 04:00

## 2017-01-27 RX ADMIN — Medication 3 MILLILITER(S): at 21:06

## 2017-01-27 RX ADMIN — Medication 0.5 MILLIGRAM(S): at 21:06

## 2017-01-27 RX ADMIN — VALSARTAN 80 MILLIGRAM(S): 80 TABLET ORAL at 06:11

## 2017-01-27 RX ADMIN — ENOXAPARIN SODIUM 40 MILLIGRAM(S): 100 INJECTION SUBCUTANEOUS at 11:49

## 2017-01-27 RX ADMIN — Medication 1 MILLIGRAM(S): at 22:27

## 2017-01-27 RX ADMIN — Medication 3 MILLILITER(S): at 15:18

## 2017-01-27 NOTE — PROGRESS NOTE ADULT - ASSESSMENT
Assessment  Acute resp failure c/w severe MARY and hypercapnea  Htn  Obesity  No evidence of structural heart disease on echo    Rec  Cont current meds  low dose asa   Will fu in office

## 2017-01-27 NOTE — PROGRESS NOTE ADULT - SUBJECTIVE AND OBJECTIVE BOX
[facility] - [facility]                                   [address] [place] [address]                                                      PHONE: [phone]                                                         FAX: [phone]  [email or web address]  ---------------------------------------------------------------------------------------------------------------------------------    Overnight events/patient complaints:awake alert      No Known Allergies    MEDICATIONS  (STANDING):  ALBUTerol/ipratropium for Nebulization 3milliLiter(s) Nebulizer every 6 hours  influenza   Vaccine 0.5milliLiter(s) IntraMuscular once  enoxaparin Injectable 40milliGRAM(s) SubCutaneous daily  doxazosin 1milliGRAM(s) Oral at bedtime  valsartan 80milliGRAM(s) Oral daily  buDESOnide   0.5 milliGRAM(s) Respule 0.5milliGRAM(s) Inhalation two times a day    MEDICATIONS  (PRN):      Vital Signs Last 24 Hrs  T(C): 36.9, Max: 37.1 (01-26 @ 18:02)  T(F): 98.5, Max: 98.7 (01-26 @ 18:02)  HR: 75 (74 - 79)  BP: 124/81 (113/68 - 130/78)  BP(mean): --  RR: 20 (19 - 20)  SpO2: 94% (94% - 99%)  Daily     Daily   ICU Vital Signs Last 24 Hrs  KIMBERLY PALUMBO  I&O's Detail    I & Os for current day (as of 27 Jan 2017 12:02)  =============================================  IN:    Oral Fluid: 360 ml    Total IN: 360 ml  ---------------------------------------------  OUT:    Total OUT: 0 ml  ---------------------------------------------  Total NET: 360 ml    I&O's Summary    I & Os for current day (as of 27 Jan 2017 12:02)  =============================================  IN: 360 ml / OUT: 0 ml / NET: 360 ml    Drug Dosing Weight  KIMBERLY PALUMBO      PHYSICAL EXAM:  General: Appears well developed, well nourished alert and cooperative.  HEENT: Head; normocephalic, atraumatic.  Eyes: Pupils reactive, cornea wnl.  Neck: Supple, no nodes adenopathy, no NVD or carotid bruit or thyromegaly.  CARDIOVASCULAR: Normal S1 and S2, No murmur, rub, gallop or lift.   LUNGS: No rales, rhonchi or wheeze. Normal breath sounds bilaterally.  ABDOMEN: Soft, nontender without mass or organomegaly. bowel sounds normoactive.  EXTREMITIES: No clubbing, cyanosis or edema. Distal pulses wnl.   SKIN: warm and dry with normal turgor.  NEURO: Alert/oriented x 3/normal motor exam. No pathologic reflexes.    PSYCH: normal affect.        LABS:          [facility]            RADIOLOGY & ADDITIONAL STUDIES:FINDINGS /   IMPRESSION:     There is no change in position of endotracheal tube which is just below   clavicles pointing towards right mainstem bronchus. There is shallow   inspiration without acute consolidation. Cardiac and mediastinal   structures cannot be assessed due to portable technique. Overall, exam is   stable.                 ERIKA OSORIO M.D., ATTENDING RADIOLOGIST  This document has been electronically signed. Jan 24 2017 11:27AM

## 2017-01-27 NOTE — PROGRESS NOTE ADULT - SUBJECTIVE AND OBJECTIVE BOX
KIMBERLY PALUMBO    [id number]    61y      Male    INTERVAL HPI/OVERNIGHT EVENTS:    Patient is doing better, SOB is improving, patient got intubated 2 days ago, has been on supplemental oxygen with nasal cannula, has no fever, chills, chest pain, nausea, vomiting, dizziness.     REVIEW OF SYSTEMS:    CONSTITUTIONAL: No fever, fatigue  RESPIRATORY: improving shortness of breath  CARDIOVASCULAR: No chest pain, palpitations  GASTROINTESTINAL: No abdominal or epigastric pain. No nausea, vomiting  NEUROLOGICAL: No headaches, memory loss, loss of strength.  MISCELLANEOUS: no joint swelling or tenderness.       Vital Signs Last 24 Hrs  T(C): 36.9, Max: 37.1 (01-26 @ 18:02)  T(F): 98.5, Max: 98.7 (01-26 @ 18:02)  HR: 75 (74 - 79)  BP: 124/81 (113/68 - 130/78)  BP(mean): --  RR: 20 (19 - 20)  SpO2: 94% (94% - 99%)    PHYSICAL EXAM:    GENERAL: middle age obese male looking comfortable  HEENT: PERRL, +EOMI  NECK: soft, Supple, No JVD,   CHEST/LUNG: decrease air entry b/l No wheezing  HEART: S1S2+, Regular rate and rhythm; No murmurs  ABDOMEN: Soft, Nontender, Nondistended; Bowel sounds present  EXTREMITIES:  2+ Peripheral Pulses, No clubbing, cyanosis, or edema  SKIN: No rashes or lesions  NEURO: AAOX3, no focal deficits, no motor r sensory loss  PSYCH: normal mood      MEDICATIONS  (STANDING):  ALBUTerol/ipratropium for Nebulization 3milliLiter(s) Nebulizer every 6 hours  influenza   Vaccine 0.5milliLiter(s) IntraMuscular once  enoxaparin Injectable 40milliGRAM(s) SubCutaneous daily  doxazosin 1milliGRAM(s) Oral at bedtime  valsartan 80milliGRAM(s) Oral daily  buDESOnide   0.5 milliGRAM(s) Respule 0.5milliGRAM(s) Inhalation two times a day KIMBERLY PALUMBO    [id number]    61y      Male    INTERVAL HPI/OVERNIGHT EVENTS:    Patient is doing better, SOB is improving, patient got extubated 2 days ago, has been on supplemental oxygen with nasal cannula, has no fever, chills, chest pain, nausea, vomiting, dizziness.     REVIEW OF SYSTEMS:    CONSTITUTIONAL: No fever, fatigue  RESPIRATORY: improving shortness of breath  CARDIOVASCULAR: No chest pain, palpitations  GASTROINTESTINAL: No abdominal or epigastric pain. No nausea, vomiting  NEUROLOGICAL: No headaches, memory loss, loss of strength.  MISCELLANEOUS: no joint swelling or tenderness.       Vital Signs Last 24 Hrs  T(C): 36.9, Max: 37.1 (01-26 @ 18:02)  T(F): 98.5, Max: 98.7 (01-26 @ 18:02)  HR: 75 (74 - 79)  BP: 124/81 (113/68 - 130/78)  BP(mean): --  RR: 20 (19 - 20)  SpO2: 94% (94% - 99%)    PHYSICAL EXAM:    GENERAL: middle age obese male looking comfortable  HEENT: PERRL, +EOMI  NECK: soft, Supple, No JVD,   CHEST/LUNG: decrease air entry b/l No wheezing  HEART: S1S2+, Regular rate and rhythm; No murmurs  ABDOMEN: Soft, Nontender, Nondistended; Bowel sounds present  EXTREMITIES:  2+ Peripheral Pulses, No clubbing, cyanosis, or edema  SKIN: No rashes or lesions  NEURO: AAOX3, no focal deficits, no motor r sensory loss  PSYCH: normal mood      MEDICATIONS  (STANDING):  ALBUTerol/ipratropium for Nebulization 3milliLiter(s) Nebulizer every 6 hours  influenza   Vaccine 0.5milliLiter(s) IntraMuscular once  enoxaparin Injectable 40milliGRAM(s) SubCutaneous daily  doxazosin 1milliGRAM(s) Oral at bedtime  valsartan 80milliGRAM(s) Oral daily  buDESOnide   0.5 milliGRAM(s) Respule 0.5milliGRAM(s) Inhalation two times a day

## 2017-01-27 NOTE — PROGRESS NOTE ADULT - ASSESSMENT
A:  Patient with acute on chronic respiratory likely with primary issue being OHS and MARY untreated.  Possible element of COPD but currently without wheeze, cough and no known prodrome.    Currently tolerating spontaneous respirations with nasal O2.  No obvious cardiac disease.    P:  Neb with albuterol and budesonide  Nasal 02  Nocturnal Bipap for now  O/P sleep assessment  Weight loss  Mobilize

## 2017-01-27 NOTE — PROGRESS NOTE ADULT - SUBJECTIVE AND OBJECTIVE BOX
PULMONARY PROGRESS NOTE      [id number]    Patient is a 61y old  Male who presents with a chief complaint of difficulty breathing (22 Jan 2017 07:20)  Patient without known pulmonary history presents s/p fall without LOC, AMS found to have severe acute hypercapnia with evidence of a chronic component.  Failed BIPAP and subsequently intubated.  Treated with antibiotics, brief steroids and bronchodilators and subsequently extubated.  Currently on nasal O2 in no distress.  Denies prodrome but as per wife he has been having increased dyspnea and fatigue "for a while".  Denies cough, wheeze nor chest pain.  Still works 2 jobs.  Reports 60 pound weight gain over last 10 years.  Ex-smoker at least 1 ppd until age 41.  Family history of COPD.  Prehospital issues only related to hypertension.  Wife reports severe snoring.     INTERVAL HPI/OVERNIGHT EVENTS:    MEDICATIONS  (STANDING):  ALBUTerol/ipratropium for Nebulization 3milliLiter(s) Nebulizer every 6 hours  influenza   Vaccine 0.5milliLiter(s) IntraMuscular once  enoxaparin Injectable 40milliGRAM(s) SubCutaneous daily  doxazosin 1milliGRAM(s) Oral at bedtime  valsartan 80milliGRAM(s) Oral daily  buDESOnide   0.5 milliGRAM(s) Respule 0.5milliGRAM(s) Inhalation two times a day      MEDICATIONS  (PRN):      Allergies    No Known Allergies    Intolerances        PAST MEDICAL & SURGICAL HISTORY:  HTN (hypertension)  No significant past surgical history      SOCIAL HISTORY  Smoking History:       REVIEW OF SYSTEMS:    CONSTITUTIONAL:  No distress    HEENT:  Eyes:  No diplopia or blurred vision. ENT:  No earache, sore throat or runny nose.    CARDIOVASCULAR:  No pressure, squeezing, tightness, heaviness or aching about the chest; no palpitations.    RESPIRATORY:  No cough, PND or orthopnea. Mod SOBOE    GASTROINTESTINAL:  No nausea, vomiting or diarrhea.    GENITOURINARY:  No dysuria, frequency or urgency.    NEUROLOGIC:  No paresthesias, fasciculations, seizures or weakness.    PSYCHIATRIC:  No disorder of thought or mood.    Vital Signs Last 24 Hrs  T(C): 36.9, Max: 37.1 (01-26 @ 18:02)  T(F): 98.5, Max: 98.7 (01-26 @ 18:02)  HR: 75 (74 - 79)  BP: 124/81 (113/68 - 130/78)  BP(mean): --  RR: 20 (19 - 20)  SpO2: 94% (94% - 99%)    PHYSICAL EXAMINATION:    GENERAL: The patient is awake and alert in no apparent distress. Morbid obesity    HEENT: Head is normocephalic and atraumatic. Extraocular muscles are intact. Mucous membranes are moist.    NECK: Supple.    LUNGS: Diminished to auscultation without wheezing, rales or rhonchi; respirations unlabored    HEART: Regular rate and rhythm without murmur.    ABDOMEN: Soft, nontender, and nondistended.      EXTREMITIES: Without any cyanosis, clubbing, rash, lesions     NEUROLOGIC: Grossly intact.    LABS:    ABG on 1/25/17  7.47/49/79 on Bipap

## 2017-01-28 PROCEDURE — 93880 EXTRACRANIAL BILAT STUDY: CPT | Mod: 26

## 2017-01-28 PROCEDURE — 99232 SBSQ HOSP IP/OBS MODERATE 35: CPT

## 2017-01-28 RX ORDER — BACITRACIN ZINC 500 UNIT/G
1 OINTMENT IN PACKET (EA) TOPICAL ONCE
Qty: 0 | Refills: 0 | Status: COMPLETED | OUTPATIENT
Start: 2017-01-28 | End: 2017-01-28

## 2017-01-28 RX ADMIN — ENOXAPARIN SODIUM 40 MILLIGRAM(S): 100 INJECTION SUBCUTANEOUS at 12:00

## 2017-01-28 RX ADMIN — Medication 3 MILLILITER(S): at 08:22

## 2017-01-28 RX ADMIN — Medication 3 MILLILITER(S): at 04:22

## 2017-01-28 RX ADMIN — Medication 3 MILLILITER(S): at 20:51

## 2017-01-28 RX ADMIN — Medication 1 MILLIGRAM(S): at 21:37

## 2017-01-28 RX ADMIN — Medication 1 APPLICATION(S): at 18:37

## 2017-01-28 RX ADMIN — Medication 3 MILLILITER(S): at 15:06

## 2017-01-28 RX ADMIN — Medication 0.5 MILLIGRAM(S): at 08:22

## 2017-01-28 RX ADMIN — Medication 0.5 MILLIGRAM(S): at 20:52

## 2017-01-28 NOTE — PROGRESS NOTE ADULT - PROBLEM SELECTOR PROBLEM 1
Acute respiratory failure with hypoxia and hypercapnia

## 2017-01-28 NOTE — PROGRESS NOTE ADULT - SUBJECTIVE AND OBJECTIVE BOX
1PULMONARY PROGRESS NOTE      [id number]    Patient is a 61y old  Male who presents with a chief complaint of difficulty breathing (22 Jan 2017 07:20)  Patient without known pulmonary history presents s/p fall without LOC, AMS found to have severe acute hypercapnia with evidence of a chronic component.  Failed BIPAP and subsequently intubated.  Treated with antibiotics, brief steroids and bronchodilators and subsequently extubated.  Currently on nasal O2 in no distress.  Denies prodrome but as per wife he has been having increased dyspnea and fatigue "for a while".  Denies cough, wheeze nor chest pain.  Still works 2 jobs.  Reports 60 pound weight gain over last 10 years.  Ex-smoker at least 1 ppd until age 41.  Family history of COPD.  Prehospital issues only related to hypertension.  Wife reports severe snoring.    INTERVAL HPI/OVERNIGHT EVENTS:  Doing well  02 requiring    MEDICATIONS  (STANDING):  ALBUTerol/ipratropium for Nebulization 3milliLiter(s) Nebulizer every 6 hours  influenza   Vaccine 0.5milliLiter(s) IntraMuscular once  enoxaparin Injectable 40milliGRAM(s) SubCutaneous daily  doxazosin 1milliGRAM(s) Oral at bedtime  valsartan 80milliGRAM(s) Oral daily  buDESOnide   0.5 milliGRAM(s) Respule 0.5milliGRAM(s) Inhalation two times a day      MEDICATIONS  (PRN):      Allergies    No Known Allergies    Intolerances        PAST MEDICAL & SURGICAL HISTORY:  HTN (hypertension)  No significant past surgical history      SOCIAL HISTORY  Smoking History:       REVIEW OF SYSTEMS:    CONSTITUTIONAL:  No distress    HEENT:  Eyes:  No diplopia or blurred vision. ENT:  No earache, sore throat or runny nose.    CARDIOVASCULAR:  No pressure, squeezing, tightness, heaviness or aching about the chest; no palpitations.    RESPIRATORY:  No cough, shortness of breath, PND or orthopnea. Mild SOBOE    GASTROINTESTINAL:  No nausea, vomiting or diarrhea.    GENITOURINARY:  No dysuria, frequency or urgency.    NEUROLOGIC:  No paresthesias, fasciculations, seizures or weakness.    PSYCHIATRIC:  No disorder of thought or mood.    Vital Signs Last 24 Hrs  T(C): 37, Max: 37 (01-28 @ 09:13)  T(F): 98.6, Max: 98.6 (01-28 @ 09:13)  HR: 82 (72 - 82)  BP: 121/67 (121/67 - 154/85)  BP(mean): --  RR: 20 (20 - 20)  SpO2: 90% (90% - 98%)    PHYSICAL EXAMINATION:    GENERAL: The patient is awake and alert in no apparent distress.     HEENT: Head is normocephalic and atraumatic. Extraocular muscles are intact. Mucous membranes are moist.    NECK: Supple.    LUNGS: Clear to auscultation without wheezing, rales or rhonchi; respirations unlabored    HEART: Regular rate and rhythm without murmur.    ABDOMEN: Soft, nontender, and nondistended.      EXTREMITIES: Without any cyanosis, clubbing, rash, lesions or edema.    NEUROLOGIC: Grossly intact.    LABS:    ABG on 1/25/17  7.47/49/79 on Bipap

## 2017-01-28 NOTE — PROGRESS NOTE ADULT - ASSESSMENT
A:  Patient with acute on chronic respiratory likely with primary issue being OHS and MARY untreated.  Possible element of COPD but currently without wheeze, cough and no known prodrome.    Currently tolerating spontaneous respirations with nasal O2.  No obvious cardiac disease.    P:  Neb with albuterol and budesonide  Nasal 02  Does not qualify for Bipap at home  Can be Discharged with 02  O/P sleep assessment soon after discharge  Weight loss  Mobilize

## 2017-01-28 NOTE — PROGRESS NOTE ADULT - PROBLEM SELECTOR PLAN 2
as above, pulmonary consult appreciated, will get further eval for home oxygen.
as above, pulmonary consult appreciated, will get further eval for home oxygen.
as above. will involve pulmonary today as he may require home NIV.
patient has no SOB, being continue on duo nebs and inhalers, we did pulse oxymetry on him, he was saturating 86% on RA, put him on 2 liters with nasal cannula his oxygen saturation came up to 97%, pulmonary saw him on follow up and it appreciated, please refer to his note. he require home O2 as his saturating went low,  was requested to work for home O2 arrangements.
Continue nebs  continue steroids  as above
Nebs / Steroids / ABX
as above
as above.

## 2017-01-28 NOTE — PROGRESS NOTE ADULT - PROBLEM SELECTOR PROBLEM 2
COPD with exacerbation

## 2017-01-28 NOTE — CHART NOTE - NSCHARTNOTEFT_GEN_A_CORE
Staple Removal    The PA was called to remove staples for a patient. First the area was inspected. The staples are located on the top of the head, 10 were counted. The wound appeared clean, dry and intact. There was an appropriate scar presented, no pus or drainage noted. The wound appeared healed with no dehiscence. Supplies were gathered; staple removal kit and gauze. The staples were removed one at a time and most came out with ease. Some were stuck in the patients hair and required more manipulation to remove them. Some minor bleeding was observed which was stopped with pressure. 10 staples were removed and accounted for. The patient tolerated the procedure well. The patient was instructed to refrain from scratching or scrubbing the area until the scab became smaller. Bacitracin was ordered for the patient to be applied once.

## 2017-01-28 NOTE — PROGRESS NOTE ADULT - SUBJECTIVE AND OBJECTIVE BOX
KIMBERLY PALUMBO    [id number]    61y      Male    INTERVAL HPI/OVERNIGHT EVENTS:    Patient is doing better, SOB is better, patient got extubated 3 days ago, has been feeling good, has been on supplemental oxygen with nasal cannula, we did pulse oxymetry on him, he was saturating 86% on RA, put him on 2 liters with nasal cannula his oxygen saturation came up to 97%, he has no fever, chills, chest pain, nausea, vomiting, dizziness, doing ok with ambulation.    REVIEW OF SYSTEMS:    CONSTITUTIONAL: No fever, no fatigue  RESPIRATORY: No cough,  No shortness of breath  CARDIOVASCULAR: No chest pain, palpitations  GASTROINTESTINAL: No abdominal or epigastric pain. No nausea, vomiting  NEUROLOGICAL: No headaches, memory loss, loss of strength.  MISCELLANEOUS: No joint swelling or tenderness.       Vital Signs Last 24 Hrs  T(C): 37, Max: 37 (01-28 @ 09:13)  T(F): 98.6, Max: 98.6 (01-28 @ 09:13)  HR: 82 (72 - 82)  BP: 121/67 (121/67 - 154/85)  BP(mean): --  RR: 20 (20 - 20)  SpO2: 86% (86% - 98%)    PHYSICAL EXAM:    GENERAL: Middle age obese male looking comfortable  HEENT: PERRL, +EOMI, has stitches on the head, wound looks healing ok.   NECK: soft, Supple, No JVD,   CHEST/LUNG: Clear to auscultate b/l, No wheezing, little decrease air entry B/L  HEART: S1S2+, Regular rate and rhythm; No murmurs.   ABDOMEN: Soft, Nontender, Nondistended; Bowel sounds present  EXTREMITIES:  2+ Peripheral Pulses, No clubbing, cyanosis, or edema  SKIN: No rashes or lesions  NEURO: AAOX3, no focal deficits, no motor r sensory loss  PSYCH: normal mood      LABS:      MEDICATIONS  (STANDING):  ALBUTerol/ipratropium for Nebulization 3milliLiter(s) Nebulizer every 6 hours  influenza   Vaccine 0.5milliLiter(s) IntraMuscular once  enoxaparin Injectable 40milliGRAM(s) SubCutaneous daily  doxazosin 1milliGRAM(s) Oral at bedtime  valsartan 80milliGRAM(s) Oral daily  buDESOnide   0.5 milliGRAM(s) Respule 0.5milliGRAM(s) Inhalation two times a day    MEDICATIONS  (PRN):      RADIOLOGY & ADDITIONAL TESTS:

## 2017-01-28 NOTE — PROGRESS NOTE ADULT - ATTENDING COMMENTS
Palliative/Ethics:  wife at bedside.   RASS 0  CAM-ICU negative  Sedation: none  VENT Bundle Reviewed:   Glycemic Control: prn  DVT PPX: enxoaparin  Nutrition: DASH/TLC  PT/OT: OOB as tolerated     Invasive Lines/Tellez:  to d/c today.
Possible MARY: Pulmonary has seen the patient, as per them he does not qualify for BiPAP at home, they will further asses him as out patient with sleep studies and pulmonary function.   Fall: Patient had fall at home before when he was brought in here, he got stitches on his head, will taken out today, CT head is unremarkable, does not know the reason, could be related to CO2 retention causing confusion and fall, patient does not remember any events, patient has carotid duplex done at primary doctors office, Echo done here and was unremarkable, PT is working with him, tried to reach his Primary doctor Dr Mcknight at , he did not pic up the call, will get carotid duplex done here, his cardiac monitor showed no arrhythmias over the course.    Morbid obesity: dietician consult, patient might need to follow with Obesity treatment clinic.
Possible MARY: Pulmonary has sen the patient, will have out patient sleep study and pulmonary function test.  Fall: Patient had fall at home before when he was brought in here, he got stitches on his head, CT head is unremarkable, does not know the reason, could be related to CO2 retention causing confusion and fall, patient does not remember any events, patient has carotid duplex done at primary doctors office, will get that records, Echo done here and was unremarkable, will get his stiches out in couple of days, PT is working with him, tried to reach his Primary doctor Dr Mcknight at , he did not pic up the call, will get carotid duplex done.
Possible MARY: Pulmonary has sen the patient, will have out patient sleep study and pulmonary function test.  Fall: Patient had fall at home before when he was brought in here, he got stitches on his head, CT head is unremarkable, does not know the reason, could be related to CO2 retention causing confusion and fall, patient does not remember any events, patient has carotid duplex done at primary doctors office, will get that records, Echo done here and was unremarkable, will get his stiches out in couple of days, will get PT eval.
Palliative/Ethics:  slightly improved respiratory status, though I feel he will be intubated for another few days and would need to be extubated to NIV.   will update family accordingly.   RASS 0  CAM-ICU negative  Sedation: dexmedetomidine for pain   VENT Bundle Reviewed: YES  Glycemic Control: prn  DVT PPX: enxoaparin  Nutrition: jevity @ 50ml/hr  PT/OT: OOB as tolerated     Invasive Lines/Tellez:  left IJ tlc- multiple blood draws. hopes to remove 1/24
I personally interviewed and examined the patient, independent of the above practitioner (ALBERTA Bower). I agree with the above, and have updated the appropriate areas of the chart as noted. I spent ___45__ minutes with the patient and family separate from the above practitioner for my evaluation and interview.      Mr. Colin is now awake and alert, though still requiring the ventilator for his hypoxemia, he is on PS 8/10 @ 40% right now as he would require higher PEEP than FiO2. the etiology of his semi-acute decompensation is not clear, however it would seem that some acute respiratory even reduced his ability to compensate for underlying, undiagnosed (as he does not go to doctors) disease. He as a left IJ TLC which we hope to remove today and an arterial line which we will keep for frequent blood draws. We've updated his family at bedside and answered any questions they may have had.

## 2017-01-28 NOTE — PROGRESS NOTE ADULT - PROBLEM SELECTOR PLAN 1
patient is s/p extubation day 3 today, on nebs, azithromycin and ceftriaxone until today and will d/c, completed steriods rajinder in ICU, currently patient  supplemental oxygen with nasal cannula, will continue for now, will wean him off, slowly.
NIV and HFO2 as tolerated. remains on nebs, azithromycin and ceftriaxone until 1/27.
patient is s/p extubation day 1, on nebs, azithromycin and ceftriaxone until 1/27,currently patient is on 2 liters of supplemental oxygen with nasal cannula, will continue for now, will wean him off, slowly.
patient is s/p extubation day 1, on nebs, azithromycin and ceftriaxone until today and will d/c, completed steriods rajinder in ICU, currently patient  supplemental oxygen with nasal cannula, will continue for now, will wean him off, slowly.
Continue ventilatory support  Wean fi02 and PEEP as tolerated  Continue Zithromax  TTE done  Cardio eval/Spoke with Dr More  Follow up sputum cultures
Intubated urgently  Maintain full vent support  CXR confirm ETT placement is good  Cont nebs/steroids/abx
Maintain CMV - attempt to keep titrating fio2 down as tolerates  Peep now normalized, SBT this AM   ABX
patient on ventilator for hypoxia and respiratory failure. Ventilator bundle and lung protective ventilator settings in place. attempting PS v APRV for patient comfort. tolerating spo2 ~90%. continuing with nebs, steroids, azithromycin and I restarted ceftriaxone today for 3 days.

## 2017-01-29 VITALS — OXYGEN SATURATION: 93 %

## 2017-01-29 PROCEDURE — 99231 SBSQ HOSP IP/OBS SF/LOW 25: CPT

## 2017-01-29 PROCEDURE — 80048 BASIC METABOLIC PNL TOTAL CA: CPT

## 2017-01-29 PROCEDURE — 93306 TTE W/DOPPLER COMPLETE: CPT

## 2017-01-29 PROCEDURE — 94660 CPAP INITIATION&MGMT: CPT

## 2017-01-29 PROCEDURE — 97163 PT EVAL HIGH COMPLEX 45 MIN: CPT

## 2017-01-29 PROCEDURE — 85610 PROTHROMBIN TIME: CPT

## 2017-01-29 PROCEDURE — 84145 PROCALCITONIN (PCT): CPT

## 2017-01-29 PROCEDURE — 93005 ELECTROCARDIOGRAM TRACING: CPT

## 2017-01-29 PROCEDURE — 83735 ASSAY OF MAGNESIUM: CPT

## 2017-01-29 PROCEDURE — 99239 HOSP IP/OBS DSCHRG MGMT >30: CPT

## 2017-01-29 PROCEDURE — 82803 BLOOD GASES ANY COMBINATION: CPT

## 2017-01-29 PROCEDURE — 97116 GAIT TRAINING THERAPY: CPT

## 2017-01-29 PROCEDURE — 85014 HEMATOCRIT: CPT

## 2017-01-29 PROCEDURE — 70450 CT HEAD/BRAIN W/O DYE: CPT

## 2017-01-29 PROCEDURE — 87633 RESP VIRUS 12-25 TARGETS: CPT

## 2017-01-29 PROCEDURE — 82550 ASSAY OF CK (CPK): CPT

## 2017-01-29 PROCEDURE — 82553 CREATINE MB FRACTION: CPT

## 2017-01-29 PROCEDURE — 84484 ASSAY OF TROPONIN QUANT: CPT

## 2017-01-29 PROCEDURE — 94760 N-INVAS EAR/PLS OXIMETRY 1: CPT

## 2017-01-29 PROCEDURE — 12002 RPR S/N/AX/GEN/TRNK2.6-7.5CM: CPT

## 2017-01-29 PROCEDURE — 86900 BLOOD TYPING SEROLOGIC ABO: CPT

## 2017-01-29 PROCEDURE — 97530 THERAPEUTIC ACTIVITIES: CPT

## 2017-01-29 PROCEDURE — 83605 ASSAY OF LACTIC ACID: CPT

## 2017-01-29 PROCEDURE — 93880 EXTRACRANIAL BILAT STUDY: CPT

## 2017-01-29 PROCEDURE — 71275 CT ANGIOGRAPHY CHEST: CPT

## 2017-01-29 PROCEDURE — 82435 ASSAY OF BLOOD CHLORIDE: CPT

## 2017-01-29 PROCEDURE — 84295 ASSAY OF SERUM SODIUM: CPT

## 2017-01-29 PROCEDURE — 83880 ASSAY OF NATRIURETIC PEPTIDE: CPT

## 2017-01-29 PROCEDURE — 99221 1ST HOSP IP/OBS SF/LOW 40: CPT

## 2017-01-29 PROCEDURE — 36600 WITHDRAWAL OF ARTERIAL BLOOD: CPT

## 2017-01-29 PROCEDURE — 97110 THERAPEUTIC EXERCISES: CPT

## 2017-01-29 PROCEDURE — 71045 X-RAY EXAM CHEST 1 VIEW: CPT

## 2017-01-29 PROCEDURE — 87040 BLOOD CULTURE FOR BACTERIA: CPT

## 2017-01-29 PROCEDURE — 82330 ASSAY OF CALCIUM: CPT

## 2017-01-29 PROCEDURE — 85027 COMPLETE CBC AUTOMATED: CPT

## 2017-01-29 PROCEDURE — 99285 EMERGENCY DEPT VISIT HI MDM: CPT | Mod: 25

## 2017-01-29 PROCEDURE — 94640 AIRWAY INHALATION TREATMENT: CPT

## 2017-01-29 PROCEDURE — 82947 ASSAY GLUCOSE BLOOD QUANT: CPT

## 2017-01-29 PROCEDURE — 87486 CHLMYD PNEUM DNA AMP PROBE: CPT

## 2017-01-29 PROCEDURE — 86901 BLOOD TYPING SEROLOGIC RH(D): CPT

## 2017-01-29 PROCEDURE — 84132 ASSAY OF SERUM POTASSIUM: CPT

## 2017-01-29 PROCEDURE — 80053 COMPREHEN METABOLIC PANEL: CPT

## 2017-01-29 PROCEDURE — 84100 ASSAY OF PHOSPHORUS: CPT

## 2017-01-29 PROCEDURE — 87798 DETECT AGENT NOS DNA AMP: CPT

## 2017-01-29 PROCEDURE — 85730 THROMBOPLASTIN TIME PARTIAL: CPT

## 2017-01-29 PROCEDURE — 81001 URINALYSIS AUTO W/SCOPE: CPT

## 2017-01-29 PROCEDURE — 94664 DEMO&/EVAL PT USE INHALER: CPT

## 2017-01-29 PROCEDURE — 85379 FIBRIN DEGRADATION QUANT: CPT

## 2017-01-29 PROCEDURE — 94003 VENT MGMT INPAT SUBQ DAY: CPT

## 2017-01-29 PROCEDURE — 87581 M.PNEUMON DNA AMP PROBE: CPT

## 2017-01-29 PROCEDURE — 86850 RBC ANTIBODY SCREEN: CPT

## 2017-01-29 RX ORDER — BUDESONIDE, MICRONIZED 100 %
1 POWDER (GRAM) MISCELLANEOUS
Qty: 1 | Refills: 1 | OUTPATIENT
Start: 2017-01-29 | End: 2017-03-29

## 2017-01-29 RX ORDER — ALBUTEROL 90 UG/1
2 AEROSOL, METERED ORAL
Qty: 1 | Refills: 1 | OUTPATIENT
Start: 2017-01-29 | End: 2017-03-29

## 2017-01-29 RX ORDER — ALBUTEROL 90 UG/1
2 AEROSOL, METERED ORAL
Qty: 0 | Refills: 0 | COMMUNITY

## 2017-01-29 RX ORDER — VALSARTAN 80 MG/1
1 TABLET ORAL
Qty: 0 | Refills: 0 | COMMUNITY

## 2017-01-29 RX ADMIN — Medication 0.5 MILLIGRAM(S): at 08:11

## 2017-01-29 RX ADMIN — Medication 3 MILLILITER(S): at 08:11

## 2017-01-29 RX ADMIN — ENOXAPARIN SODIUM 40 MILLIGRAM(S): 100 INJECTION SUBCUTANEOUS at 12:14

## 2017-01-29 RX ADMIN — Medication 3 MILLILITER(S): at 03:56

## 2017-01-29 RX ADMIN — Medication 3 MILLILITER(S): at 15:40

## 2017-01-29 RX ADMIN — VALSARTAN 80 MILLIGRAM(S): 80 TABLET ORAL at 05:51

## 2017-01-29 NOTE — DISCHARGE NOTE ADULT - CARE PLAN
Principal Discharge DX:	Acute respiratory failure with hypoxia and hypercapnia  Goal:	Use oxygen and inhalors  Instructions for follow-up, activity and diet:	Low salt and low caloric diet  Secondary Diagnosis:	COPD with exacerbation  Secondary Diagnosis:	Hypoxia  Secondary Diagnosis:	Morbid obesity, unspecified obesity type

## 2017-01-29 NOTE — DISCHARGE NOTE ADULT - PATIENT PORTAL LINK FT
“You can access the Follow[facility] Patient Portal, offered by [facility], by registering with the following website: [email or web address]”

## 2017-01-29 NOTE — DISCHARGE NOTE ADULT - ADDITIONAL INSTRUCTIONS
Follow Dr Hurst Lung doctor, please call his clinic for follow up.   Follow up with your primary medical doctor in 1 week   Please ask your doctor to refer you to Obesity treatment clinic.

## 2017-01-29 NOTE — DISCHARGE NOTE ADULT - HOSPITAL COURSE
HPI  62 y/o male with h/o HTN presents to ED c/o non-productive cough x 2 weeks, with increasing SOB, generalized weakness, cough, and voice hoarseness x 3 days. Pt states that he was evaluated by his PMD at onset of symptoms with negative CXR, Dx bronchitis, Rx abx and steroids though his symptoms have not improved. Pt has also noticed increased leg swelling and abdominal distension over the past two days. Per pt's wife at bedside, pt has been reluctant to seek further evaluation for his worsened symptoms, however pt sustained a fall this evening, prompting him to seek ED evaluation. Pt reports that he slipped and fell on his wet bathroom floor and hit his head on the garbage pail. He denies LOC and believes that his fall was mechanical in nature. Pt has no other complaints at this time. No h/o cardiac disease      Hospitalist:    1: Acute respiratory failure with hypoxia and hypercapnia: Patient was admitted in ICU as he was intubated managed their by intensivist and pulmonary team for couple of days with ventilator bundle, he os is s/p extubation day 4 today, he was continued on nebs, azithromycin and ceftriaxone, he completed course of antibiotics for 5  days, he has completed steriods over the course during ICU, he was followed by pulmonary, his oxygen was weaned down to 2liter with nasal cannula.   his acute respiratory failure has improved.        2: COPD with exacerbation: patient has no SOB, being continue on duo nebs and inhalers, we did pulse oxymetry on him, he was saturating 86% on RA, put him on 2 liters with nasal cannula his oxygen saturation came up to 97%, pulmonary saw him on follow up and it appreciated, please refer to his note. he require home O2 as his saturating went low,  was requested to work for home O2 arrangements, his home oxygen is all set up already today.       3: Possible MARY: Pulmonary has seen the patient, as per them he does not qualify for BiPAP at home, they will further asses him as out patient with sleep studies and pulmonary function.     4: Fall: Patient had fall at home before when he was brought in here, he got stitches on his head, will taken out today, CT head is unremarkable, does not know the reason, could be related to CO2 retention causing confusion and fall, patient does not remember any events, patient has carotid duplex done at primary doctors office, Echo done here and was unremarkable, PT worked with him he stable doing ok with then, tried to reach his Primary doctor Dr Mcknight at , he did not pic up the call, we got carotid duplex done here and came out to be unremarkable, his cardiac monitor showed no arrhythmias over the course, fall was most likely CO2 rension and acute respiratory failure and loss of consciousness.      5: Morbid obesity: dietician consulted, patient might need to follow with Obesity treatment clinic, counselled about the weight loss and low caloric diet.     Patient is doing well, his symptoms has been resolved, patient is being discharged home in a stable condition.       Vital Signs  T(F): 97.8  HR: 75   BP: 130/72   RR: 18   SpO2: 90%     PHYSICAL EXAM:    GENERAL: middle age obese male looking comfortable  HEENT: PERRL, +EOMI  NECK: soft, Supple, No JVD,   CHEST/LUNG: decrease air entry b/l No wheezing  HEART: S1S2+, Regular rate and rhythm; No murmurs  ABDOMEN: Soft, Nontender, Nondistended; Bowel sounds present  EXTREMITIES:  2+ Peripheral Pulses, No clubbing, cyanosis, or edema  SKIN: No rashes or lesions  NEURO: AAOX3, no focal deficits, no motor r sensory loss  PSYCH: normal mood

## 2017-01-29 NOTE — DISCHARGE NOTE ADULT - MEDICATION SUMMARY - MEDICATIONS TO TAKE
I will START or STAY ON the medications listed below when I get home from the hospital:    Oxyegn with nasal Cannulla 2 liters   -- Patient room air oxygen saturation is 86%, with 2 liters it came up to 97% on 2 liters, diagonsis is   COPD   ICD 10 code is J44.9  -- Indication: For COPD with exacerbation    budesonide 0.5 mg/2 mL inhalation suspension  -- 1 puff(s) inhaled 2 times a day  -- Indication: For COPD with exacerbation    doxazosin 1 mg oral tablet  -- 1 tab(s) by mouth once a day  -- Indication: For HTN (hypertension)    valsartan-hydroCHLOROthiazide 160mg-12.5mg oral tablet  -- 1 tab(s) by mouth once a day  -- Indication: For HTN (hypertension)    Proventil HFA 90 mcg/inh inhalation aerosol  -- 2 puff(s) inhaled 4 times a day  -- Indication: For COPD with exacerbation I will START or STAY ON the medications listed below when I get home from the hospital:    Oxyegn with nasal Cannulla 2 liters   -- Patient room air oxygen saturation is 86%, with 2 liters it came up to 97% on 2 liters, diagonsis is   COPD   ICD 10 code is J44.9  -- Indication: For COPD with exacerbation    doxazosin 1 mg oral tablet  -- 1 tab(s) by mouth once a day  -- Indication: For HTN (hypertension)    valsartan-hydroCHLOROthiazide 160mg-12.5mg oral tablet  -- 1 tab(s) by mouth once a day  -- Indication: For HTN (hypertension)

## 2017-01-30 PROBLEM — Z00.00 ENCOUNTER FOR PREVENTIVE HEALTH EXAMINATION: Status: ACTIVE | Noted: 2017-01-30

## 2017-01-30 PROBLEM — I10 ESSENTIAL (PRIMARY) HYPERTENSION: Chronic | Status: ACTIVE | Noted: 2017-01-22

## 2017-02-03 ENCOUNTER — APPOINTMENT (OUTPATIENT)
Dept: PULMONOLOGY | Facility: CLINIC | Age: 62
End: 2017-02-03

## 2017-02-03 VITALS
OXYGEN SATURATION: 96 % | BODY MASS INDEX: 42.13 KG/M2 | WEIGHT: 278 LBS | HEART RATE: 81 BPM | SYSTOLIC BLOOD PRESSURE: 118 MMHG | DIASTOLIC BLOOD PRESSURE: 80 MMHG | RESPIRATION RATE: 16 BRPM | HEIGHT: 68 IN

## 2017-02-06 ENCOUNTER — MEDICATION RENEWAL (OUTPATIENT)
Age: 62
End: 2017-02-06

## 2017-02-06 RX ORDER — UMECLIDINIUM 62.5 UG/1
62.5 AEROSOL, POWDER ORAL DAILY
Qty: 1 | Refills: 6 | Status: DISCONTINUED | COMMUNITY
Start: 2017-02-03 | End: 2017-02-06

## 2017-02-16 ENCOUNTER — OUTPATIENT (OUTPATIENT)
Dept: OUTPATIENT SERVICES | Facility: HOSPITAL | Age: 62
LOS: 1 days | End: 2017-02-16
Payer: COMMERCIAL

## 2017-02-16 DIAGNOSIS — G47.33 OBSTRUCTIVE SLEEP APNEA (ADULT) (PEDIATRIC): ICD-10-CM

## 2017-02-16 PROCEDURE — 95811 POLYSOM 6/>YRS CPAP 4/> PARM: CPT | Mod: 26

## 2017-02-16 PROCEDURE — 95811 POLYSOM 6/>YRS CPAP 4/> PARM: CPT

## 2017-02-27 ENCOUNTER — APPOINTMENT (OUTPATIENT)
Dept: PULMONOLOGY | Facility: CLINIC | Age: 62
End: 2017-02-27

## 2017-03-07 ENCOUNTER — APPOINTMENT (OUTPATIENT)
Dept: PULMONOLOGY | Facility: CLINIC | Age: 62
End: 2017-03-07

## 2017-03-07 VITALS
WEIGHT: 257 LBS | DIASTOLIC BLOOD PRESSURE: 80 MMHG | SYSTOLIC BLOOD PRESSURE: 124 MMHG | BODY MASS INDEX: 39.08 KG/M2 | HEART RATE: 62 BPM | OXYGEN SATURATION: 97 %

## 2017-03-07 VITALS — RESPIRATION RATE: 16 BRPM

## 2017-03-08 ENCOUNTER — OTHER (OUTPATIENT)
Age: 62
End: 2017-03-08

## 2017-03-08 DIAGNOSIS — M25.562 PAIN IN RIGHT KNEE: ICD-10-CM

## 2017-03-08 DIAGNOSIS — M25.561 PAIN IN RIGHT KNEE: ICD-10-CM

## 2017-03-13 ENCOUNTER — OTHER (OUTPATIENT)
Age: 62
End: 2017-03-13

## 2017-03-16 ENCOUNTER — APPOINTMENT (OUTPATIENT)
Dept: ORTHOPEDIC SURGERY | Facility: CLINIC | Age: 62
End: 2017-03-16

## 2017-03-16 VITALS
HEIGHT: 68 IN | SYSTOLIC BLOOD PRESSURE: 118 MMHG | BODY MASS INDEX: 38.95 KG/M2 | DIASTOLIC BLOOD PRESSURE: 76 MMHG | WEIGHT: 257 LBS | HEART RATE: 89 BPM

## 2017-06-13 ENCOUNTER — APPOINTMENT (OUTPATIENT)
Dept: PULMONOLOGY | Facility: CLINIC | Age: 62
End: 2017-06-13

## 2017-06-13 ENCOUNTER — OTHER (OUTPATIENT)
Age: 62
End: 2017-06-13

## 2017-06-13 VITALS
OXYGEN SATURATION: 98 % | SYSTOLIC BLOOD PRESSURE: 112 MMHG | DIASTOLIC BLOOD PRESSURE: 62 MMHG | WEIGHT: 219 LBS | HEART RATE: 62 BPM | BODY MASS INDEX: 33.3 KG/M2

## 2017-06-13 VITALS — RESPIRATION RATE: 16 BRPM

## 2017-06-13 RX ORDER — BUDESONIDE 90 UG/1
90 AEROSOL, POWDER RESPIRATORY (INHALATION)
Refills: 0 | Status: DISCONTINUED | COMMUNITY
End: 2017-06-13

## 2017-06-13 RX ORDER — VALSARTAN AND HYDROCHLOROTHIAZIDE 160; 12.5 MG/1; MG/1
160-12.5 TABLET, FILM COATED ORAL
Refills: 0 | Status: DISCONTINUED | COMMUNITY
End: 2017-06-13

## 2017-06-13 RX ORDER — FLURBIPROFEN 100 MG
100 TABLET ORAL
Refills: 0 | Status: DISCONTINUED | COMMUNITY
End: 2017-06-13

## 2017-06-20 ENCOUNTER — OTHER (OUTPATIENT)
Age: 62
End: 2017-06-20

## 2017-06-27 ENCOUNTER — APPOINTMENT (OUTPATIENT)
Dept: ORTHOPEDIC SURGERY | Facility: CLINIC | Age: 62
End: 2017-06-27

## 2017-06-27 VITALS
DIASTOLIC BLOOD PRESSURE: 77 MMHG | SYSTOLIC BLOOD PRESSURE: 124 MMHG | BODY MASS INDEX: 33.19 KG/M2 | HEART RATE: 55 BPM | WEIGHT: 219 LBS | HEIGHT: 68 IN

## 2017-06-27 DIAGNOSIS — Z78.9 OTHER SPECIFIED HEALTH STATUS: ICD-10-CM

## 2017-06-27 DIAGNOSIS — Z87.09 PERSONAL HISTORY OF OTHER DISEASES OF THE RESPIRATORY SYSTEM: ICD-10-CM

## 2017-06-27 RX ORDER — TIOTROPIUM BROMIDE 18 UG/1
18 CAPSULE ORAL; RESPIRATORY (INHALATION) DAILY
Qty: 3 | Refills: 3 | Status: DISCONTINUED | COMMUNITY
Start: 2017-02-06 | End: 2017-06-27

## 2017-06-27 RX ORDER — FLUTICASONE FUROATE AND VILANTEROL TRIFENATATE 200; 25 UG/1; UG/1
200-25 POWDER RESPIRATORY (INHALATION)
Qty: 3 | Refills: 3 | Status: DISCONTINUED | COMMUNITY
Start: 2017-02-03 | End: 2017-06-27

## 2017-06-28 ENCOUNTER — OTHER (OUTPATIENT)
Age: 62
End: 2017-06-28

## 2017-06-30 ENCOUNTER — OTHER (OUTPATIENT)
Age: 62
End: 2017-06-30

## 2017-07-25 ENCOUNTER — APPOINTMENT (OUTPATIENT)
Dept: ORTHOPEDIC SURGERY | Facility: CLINIC | Age: 62
End: 2017-07-25

## 2017-07-25 VITALS
HEIGHT: 68 IN | WEIGHT: 219 LBS | SYSTOLIC BLOOD PRESSURE: 131 MMHG | HEART RATE: 54 BPM | BODY MASS INDEX: 33.19 KG/M2 | DIASTOLIC BLOOD PRESSURE: 79 MMHG

## 2017-07-25 RX ORDER — HYALURONATE SODIUM 10 MG/ML
25 SYRINGE (ML) INTRAARTICULAR
Qty: 10 | Refills: 0 | Status: DISCONTINUED | OUTPATIENT
Start: 2017-06-27 | End: 2017-07-25

## 2017-07-25 RX ORDER — MELOXICAM 7.5 MG/1
7.5 TABLET ORAL TWICE DAILY
Qty: 60 | Refills: 0 | Status: DISCONTINUED | COMMUNITY
Start: 2017-03-16 | End: 2017-07-25

## 2017-08-01 ENCOUNTER — APPOINTMENT (OUTPATIENT)
Dept: ORTHOPEDIC SURGERY | Facility: CLINIC | Age: 62
End: 2017-08-01
Payer: COMMERCIAL

## 2017-08-01 VITALS
HEIGHT: 68 IN | HEART RATE: 59 BPM | BODY MASS INDEX: 33.19 KG/M2 | SYSTOLIC BLOOD PRESSURE: 125 MMHG | WEIGHT: 219 LBS | DIASTOLIC BLOOD PRESSURE: 78 MMHG

## 2017-08-01 PROCEDURE — 20610 DRAIN/INJ JOINT/BURSA W/O US: CPT | Mod: RT

## 2017-08-08 ENCOUNTER — APPOINTMENT (OUTPATIENT)
Dept: ORTHOPEDIC SURGERY | Facility: CLINIC | Age: 62
End: 2017-08-08
Payer: COMMERCIAL

## 2017-08-08 VITALS
BODY MASS INDEX: 33.19 KG/M2 | WEIGHT: 219 LBS | DIASTOLIC BLOOD PRESSURE: 75 MMHG | SYSTOLIC BLOOD PRESSURE: 115 MMHG | HEART RATE: 56 BPM | HEIGHT: 68 IN

## 2017-08-08 PROCEDURE — 20610 DRAIN/INJ JOINT/BURSA W/O US: CPT | Mod: LT

## 2017-08-08 RX ORDER — CLARITHROMYCIN 500 MG/1
500 TABLET, FILM COATED ORAL
Qty: 20 | Refills: 0 | Status: DISCONTINUED | COMMUNITY
Start: 2017-06-17 | End: 2017-08-08

## 2017-08-14 ENCOUNTER — OTHER (OUTPATIENT)
Age: 62
End: 2017-08-14

## 2017-08-15 ENCOUNTER — APPOINTMENT (OUTPATIENT)
Dept: ORTHOPEDIC SURGERY | Facility: CLINIC | Age: 62
End: 2017-08-15
Payer: COMMERCIAL

## 2017-08-15 VITALS
TEMPERATURE: 97.8 F | WEIGHT: 219 LBS | BODY MASS INDEX: 33.19 KG/M2 | HEART RATE: 55 BPM | SYSTOLIC BLOOD PRESSURE: 125 MMHG | HEIGHT: 68 IN | DIASTOLIC BLOOD PRESSURE: 78 MMHG

## 2017-08-15 PROCEDURE — 20610 DRAIN/INJ JOINT/BURSA W/O US: CPT | Mod: LT

## 2017-08-22 ENCOUNTER — APPOINTMENT (OUTPATIENT)
Dept: ORTHOPEDIC SURGERY | Facility: CLINIC | Age: 62
End: 2017-08-22
Payer: COMMERCIAL

## 2017-08-22 VITALS
BODY MASS INDEX: 31.18 KG/M2 | SYSTOLIC BLOOD PRESSURE: 131 MMHG | HEART RATE: 58 BPM | HEIGHT: 66 IN | DIASTOLIC BLOOD PRESSURE: 79 MMHG | WEIGHT: 194 LBS

## 2017-08-22 PROCEDURE — 20610 DRAIN/INJ JOINT/BURSA W/O US: CPT | Mod: RT

## 2017-08-29 ENCOUNTER — OTHER (OUTPATIENT)
Age: 62
End: 2017-08-29

## 2017-08-30 ENCOUNTER — OTHER (OUTPATIENT)
Age: 62
End: 2017-08-30

## 2017-09-05 ENCOUNTER — OTHER (OUTPATIENT)
Age: 62
End: 2017-09-05

## 2017-09-06 ENCOUNTER — APPOINTMENT (OUTPATIENT)
Dept: PULMONOLOGY | Facility: CLINIC | Age: 62
End: 2017-09-06
Payer: COMMERCIAL

## 2017-09-06 VITALS — SYSTOLIC BLOOD PRESSURE: 126 MMHG | DIASTOLIC BLOOD PRESSURE: 72 MMHG | BODY MASS INDEX: 31.64 KG/M2 | WEIGHT: 196 LBS

## 2017-09-06 PROCEDURE — 99214 OFFICE O/P EST MOD 30 MIN: CPT

## 2017-09-06 RX ORDER — ALBUTEROL SULFATE 2.5 MG/3ML
(2.5 MG/3ML) VIAL, NEBULIZER (ML) INHALATION
Refills: 0 | Status: DISCONTINUED | COMMUNITY
End: 2017-09-06

## 2018-02-16 ENCOUNTER — OTHER (OUTPATIENT)
Age: 63
End: 2018-02-16

## 2018-03-14 ENCOUNTER — APPOINTMENT (OUTPATIENT)
Dept: PULMONOLOGY | Facility: CLINIC | Age: 63
End: 2018-03-14
Payer: COMMERCIAL

## 2018-03-14 VITALS — RESPIRATION RATE: 16 BRPM

## 2018-03-14 VITALS
HEART RATE: 50 BPM | DIASTOLIC BLOOD PRESSURE: 80 MMHG | WEIGHT: 175 LBS | SYSTOLIC BLOOD PRESSURE: 128 MMHG | HEIGHT: 66 IN | BODY MASS INDEX: 28.12 KG/M2 | OXYGEN SATURATION: 99 %

## 2018-03-14 PROCEDURE — 99214 OFFICE O/P EST MOD 30 MIN: CPT

## 2018-05-15 ENCOUNTER — APPOINTMENT (OUTPATIENT)
Dept: CARDIOLOGY | Facility: CLINIC | Age: 63
End: 2018-05-15
Payer: MEDICARE

## 2018-05-15 ENCOUNTER — RX RENEWAL (OUTPATIENT)
Age: 63
End: 2018-05-15

## 2018-05-15 PROCEDURE — A9500: CPT

## 2018-05-15 PROCEDURE — 78452 HT MUSCLE IMAGE SPECT MULT: CPT

## 2018-05-15 PROCEDURE — 93015 CV STRESS TEST SUPVJ I&R: CPT

## 2018-05-15 RX ORDER — REGADENOSON 0.08 MG/ML
0.4 INJECTION, SOLUTION INTRAVENOUS
Qty: 1 | Refills: 0 | Status: COMPLETED | OUTPATIENT
Start: 2018-05-15

## 2018-05-15 RX ADMIN — REGADENOSON 0 MG/5ML: 0.08 INJECTION, SOLUTION INTRAVENOUS at 00:00

## 2018-05-18 RX ORDER — KIT FOR THE PREPARATION OF TECHNETIUM TC99M SESTAMIBI 1 MG/5ML
INJECTION, POWDER, LYOPHILIZED, FOR SOLUTION PARENTERAL
Refills: 0 | Status: COMPLETED | OUTPATIENT
Start: 2018-05-18

## 2018-05-18 RX ADMIN — KIT FOR THE PREPARATION OF TECHNETIUM TC99M SESTAMIBI 0: 1 INJECTION, POWDER, LYOPHILIZED, FOR SOLUTION PARENTERAL at 00:00

## 2018-06-06 ENCOUNTER — RECORD ABSTRACTING (OUTPATIENT)
Age: 63
End: 2018-06-06

## 2018-06-06 RX ORDER — ASPIRIN 81 MG
81 TABLET,CHEWABLE ORAL
Refills: 0 | Status: ACTIVE | COMMUNITY

## 2018-06-07 ENCOUNTER — APPOINTMENT (OUTPATIENT)
Dept: CARDIOLOGY | Facility: CLINIC | Age: 63
End: 2018-06-07
Payer: MEDICARE

## 2018-06-07 VITALS
BODY MASS INDEX: 29.57 KG/M2 | DIASTOLIC BLOOD PRESSURE: 78 MMHG | WEIGHT: 184 LBS | RESPIRATION RATE: 16 BRPM | HEART RATE: 46 BPM | SYSTOLIC BLOOD PRESSURE: 122 MMHG | HEIGHT: 66 IN

## 2018-06-07 PROCEDURE — 93000 ELECTROCARDIOGRAM COMPLETE: CPT

## 2018-06-07 PROCEDURE — 99214 OFFICE O/P EST MOD 30 MIN: CPT

## 2018-06-07 RX ORDER — DOXAZOSIN 1 MG/1
1 TABLET ORAL
Refills: 0 | Status: DISCONTINUED | COMMUNITY
End: 2018-06-07

## 2018-06-07 RX ORDER — ALBUTEROL SULFATE 90 UG/1
108 (90 BASE) AEROSOL, METERED RESPIRATORY (INHALATION)
Qty: 1 | Refills: 6 | Status: DISCONTINUED | COMMUNITY
Start: 2017-09-06 | End: 2018-06-07

## 2018-06-07 RX ORDER — MELOXICAM 7.5 MG/1
7.5 TABLET ORAL TWICE DAILY
Qty: 60 | Refills: 0 | Status: DISCONTINUED | COMMUNITY
Start: 2017-06-28 | End: 2018-06-07

## 2018-06-07 RX ORDER — PNEUMOCOCCAL 23-VAL P-SAC VAC 25MCG/0.5
25 VIAL (ML) INJECTION
Qty: 1 | Refills: 0 | Status: DISCONTINUED | COMMUNITY
Start: 2017-09-30 | End: 2018-06-07

## 2018-06-07 RX ORDER — ACETAMINOPHEN 500 MG
500 TABLET ORAL
Refills: 0 | Status: DISCONTINUED | COMMUNITY
End: 2018-06-07

## 2018-06-07 RX ORDER — SODIUM HYALURONATE INTRA-ARTICULAR SOLN PREF SYR 25 MG/2.5ML 25/2.5 MG/ML
25 SOLUTION PREFILLED SYRINGE INTRAARTICULAR
Qty: 25 | Refills: 0 | Status: DISCONTINUED | OUTPATIENT
Start: 2018-02-16 | End: 2018-06-07

## 2018-06-10 ENCOUNTER — TRANSCRIPTION ENCOUNTER (OUTPATIENT)
Age: 63
End: 2018-06-10

## 2018-07-10 ENCOUNTER — APPOINTMENT (OUTPATIENT)
Dept: PULMONOLOGY | Facility: CLINIC | Age: 63
End: 2018-07-10
Payer: MEDICARE

## 2018-07-10 VITALS
HEART RATE: 57 BPM | RESPIRATION RATE: 16 BRPM | SYSTOLIC BLOOD PRESSURE: 110 MMHG | DIASTOLIC BLOOD PRESSURE: 78 MMHG | OXYGEN SATURATION: 99 %

## 2018-07-10 VITALS — BODY MASS INDEX: 30.51 KG/M2 | WEIGHT: 189 LBS

## 2018-07-10 DIAGNOSIS — J96.21 ACUTE AND CHRONIC RESPIRATORY FAILURE WITH HYPOXIA: ICD-10-CM

## 2018-07-10 DIAGNOSIS — J96.22 ACUTE AND CHRONIC RESPIRATORY FAILURE WITH HYPOXIA: ICD-10-CM

## 2018-07-10 PROCEDURE — 94010 BREATHING CAPACITY TEST: CPT

## 2018-07-10 PROCEDURE — 99214 OFFICE O/P EST MOD 30 MIN: CPT | Mod: 25

## 2018-07-10 RX ORDER — LEVOFLOXACIN 500 MG/1
500 TABLET, FILM COATED ORAL
Qty: 10 | Refills: 0 | Status: DISCONTINUED | COMMUNITY
Start: 2018-05-30 | End: 2018-07-10

## 2018-07-10 RX ORDER — SERTRALINE 25 MG/1
25 TABLET, FILM COATED ORAL
Qty: 30 | Refills: 0 | Status: DISCONTINUED | COMMUNITY
Start: 2018-04-11 | End: 2018-07-10

## 2018-07-10 RX ORDER — ATORVASTATIN CALCIUM 40 MG/1
40 TABLET, FILM COATED ORAL
Refills: 0 | Status: DISCONTINUED | COMMUNITY
End: 2018-07-10

## 2018-09-12 ENCOUNTER — OUTPATIENT (OUTPATIENT)
Dept: OUTPATIENT SERVICES | Facility: HOSPITAL | Age: 63
LOS: 1 days | End: 2018-09-12
Payer: MEDICARE

## 2018-09-12 DIAGNOSIS — G47.33 OBSTRUCTIVE SLEEP APNEA (ADULT) (PEDIATRIC): ICD-10-CM

## 2018-09-12 PROCEDURE — 95806 SLEEP STUDY UNATT&RESP EFFT: CPT

## 2018-09-12 PROCEDURE — G0399: CPT

## 2018-09-12 PROCEDURE — 95806 SLEEP STUDY UNATT&RESP EFFT: CPT | Mod: 26

## 2018-11-06 ENCOUNTER — APPOINTMENT (OUTPATIENT)
Dept: PULMONOLOGY | Facility: CLINIC | Age: 63
End: 2018-11-06
Payer: MEDICARE

## 2018-11-06 VITALS — WEIGHT: 214 LBS | DIASTOLIC BLOOD PRESSURE: 60 MMHG | SYSTOLIC BLOOD PRESSURE: 118 MMHG | BODY MASS INDEX: 34.54 KG/M2

## 2018-11-06 VITALS — OXYGEN SATURATION: 98 % | HEART RATE: 52 BPM

## 2018-11-06 VITALS — RESPIRATION RATE: 16 BRPM

## 2018-11-06 PROCEDURE — 99214 OFFICE O/P EST MOD 30 MIN: CPT

## 2018-11-14 ENCOUNTER — RX RENEWAL (OUTPATIENT)
Age: 63
End: 2018-11-14

## 2018-11-28 ENCOUNTER — APPOINTMENT (OUTPATIENT)
Dept: CARDIOLOGY | Facility: CLINIC | Age: 63
End: 2018-11-28
Payer: MEDICARE

## 2018-11-28 PROCEDURE — 93880 EXTRACRANIAL BILAT STUDY: CPT

## 2018-11-28 PROCEDURE — 93306 TTE W/DOPPLER COMPLETE: CPT

## 2018-11-29 ENCOUNTER — RX RENEWAL (OUTPATIENT)
Age: 63
End: 2018-11-29

## 2018-12-05 ENCOUNTER — APPOINTMENT (OUTPATIENT)
Dept: CARDIOLOGY | Facility: CLINIC | Age: 63
End: 2018-12-05
Payer: MEDICARE

## 2018-12-05 VITALS — SYSTOLIC BLOOD PRESSURE: 140 MMHG | DIASTOLIC BLOOD PRESSURE: 70 MMHG

## 2018-12-05 VITALS
DIASTOLIC BLOOD PRESSURE: 80 MMHG | HEIGHT: 66 IN | SYSTOLIC BLOOD PRESSURE: 144 MMHG | WEIGHT: 214 LBS | RESPIRATION RATE: 14 BRPM | BODY MASS INDEX: 34.39 KG/M2 | HEART RATE: 52 BPM

## 2018-12-05 PROCEDURE — 99214 OFFICE O/P EST MOD 30 MIN: CPT

## 2018-12-05 PROCEDURE — 93000 ELECTROCARDIOGRAM COMPLETE: CPT

## 2018-12-05 NOTE — ASSESSMENT
[FreeTextEntry1] : ECG: SB, leftward axis, no ST-T abnormalities\par \par BW 6/2018:  HDL 69 LDL 46 TG 61\par \par CAROTID DUPLEX 11/2018:\par 1. All arteries were clearly visualized.\par 2. There is 1-49% stenosis of the left proximal ICA.\par 3. There is 1-49% stenosis of the right proximal ICA.\par 4. There is antegrade flow in the right and left vertebral arteries.\par 5. Recommend clinical correlation with the above findings.\par \par ECHO 11/2018:\par 1. Technically difficult study due to body habitus.\par 2. Normal left ventricular internal cavity size.\par 3. Normal global left ventricular systolic function.\par 4. Left ventricular ejection fraction, by visual estimation, is 55 to 60%.\par 5. Normal right ventricular size and systolic function.\par 6. Mildly dilated left atrium.\par 7. The right atrium is normal in size and structure.\par 8. Mild aortic valve sclerosis without stenosis.\par 9. Trace aortic regurgitation.\par 10. Structurally normal mitral valve. No evidence of mitral stenosis or significant regurgitation.\par 11. Inadequate TR jet to evaluate RVSP.\par 12. There is mild dilatation of the ascending aorta. Asc Ao 4.0cm\par 13. There is lipomatous hypertrophy of the interatrial septum, without evidence of shunting.\par 14. Recommend clinical correlation with the above findings.\par \par PHARM NUCLEAR STRESS TSET 5/2018:\par 1. Normal Sestamibi myocardial perfusion SPECT imaging with artifact as noted above.\par 2. Due to artifact, the sensitivity and specificity of the test are reduced.\par 3. Left ventricular function was normal with an EF of 67%.\par 4. The EKG was negative for ischemia.\par 5. The blood pressure response was hypertensive baseline with normal response to Regadenason.\par 6. The patient developed occasional PVCs during exercise.\par 7. The patient developed shortness of breath during stress that resolved with rest.\par 8. The test was terminated due to completion of the protocol.\par 9. When compared to prior study dated 2017, there is no longer evidence of ischemia.\par 10. Recommend clinical correlation with the above findings.

## 2018-12-05 NOTE — HISTORY OF PRESENT ILLNESS
[FreeTextEntry1] : Patient is a 64yo M with h/o mild CAD, HTN, COPD, MARY here for cardiac follow up. Patient has been doing well without any chest pain or shortness of breath. Overall feeling well. Mild sore knees. Patient denies PND/orthopnea/edema/palpitations/syncope/claudication. No new complaints since last visit. Had hernia surgery and tolerated well. \par \par ROS: GI and  negative

## 2018-12-05 NOTE — ADDENDUM
[FreeTextEntry1] : Patient to be evaluated for hernia surgery. Given above findings, he is at low CV risk for surgery. Can hold aspirin 1 week prior and restart post-operatively.

## 2018-12-05 NOTE — DISCUSSION/SUMMARY
[FreeTextEntry1] : Patient is a 64yo M with mild non-obstructive CAD (coronary calcifications seen on CT), HTN,HLD, obesity MARY here for cardiac follow up. Stress testing earlier this year showed no evidence of ischemia and preserved LV systolic function. Echo shows normal biventricular function and no clinically significant valvular abnormalities. Carotid with mild disease, needs med management. 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 \par \par 1. Continue medical management of CAD\par  2. Continue current antihypertensives, blood pressure is well controlled and seems to have white coat syndrome. Will monitor at home bid for 2 weeks and call with results. ADivsed weight loss as well before med changes\par 3. Recommend aggressive diet and lifestyle modifications \par 4. Recommend 30 minutes aerobic activity 4 to 5 days per week as tolerated\par 5. CPAP for MARY\par 6. Follow up 6 months\par 7. Lipids at goal, continue current statin dose\par 8. Echo and carotid in 1 year

## 2018-12-05 NOTE — PHYSICAL EXAM
[General Appearance - Well Developed] : well developed [Normal Appearance] : normal appearance [Well Groomed] : well groomed [General Appearance - Well Nourished] : well nourished [No Deformities] : no deformities [General Appearance - In No Acute Distress] : no acute distress [Normal Conjunctiva] : the conjunctiva exhibited no abnormalities [Eyelids - No Xanthelasma] : the eyelids demonstrated no xanthelasmas [Normal Oral Mucosa] : normal oral mucosa [No Oral Pallor] : no oral pallor [No Oral Cyanosis] : no oral cyanosis [Normal Jugular Venous A Waves Present] : normal jugular venous A waves present [Normal Jugular Venous V Waves Present] : normal jugular venous V waves present [No Jugular Venous Cantrell A Waves] : no jugular venous cantrell A waves [Respiration, Rhythm And Depth] : normal respiratory rhythm and effort [Exaggerated Use Of Accessory Muscles For Inspiration] : no accessory muscle use [Auscultation Breath Sounds / Voice Sounds] : lungs were clear to auscultation bilaterally [Heart Rate And Rhythm] : heart rate and rhythm were normal [Heart Sounds] : normal S1 and S2 [Murmurs] : no murmurs present [Abdomen Soft] : soft [Abdomen Tenderness] : non-tender [] : no hepato-splenomegaly [Abdomen Mass (___ Cm)] : no abdominal mass palpated [Abnormal Walk] : normal gait [Nail Clubbing] : no clubbing of the fingernails [Cyanosis, Localized] : no localized cyanosis [Skin Color & Pigmentation] : normal skin color and pigmentation [Skin Turgor] : normal skin turgor [Oriented To Time, Place, And Person] : oriented to person, place, and time [Affect] : the affect was normal [FreeTextEntry1] : no edema

## 2019-01-28 ENCOUNTER — RX RENEWAL (OUTPATIENT)
Age: 64
End: 2019-01-28

## 2019-05-09 ENCOUNTER — TRANSCRIPTION ENCOUNTER (OUTPATIENT)
Age: 64
End: 2019-05-09

## 2019-05-09 ENCOUNTER — RX RENEWAL (OUTPATIENT)
Age: 64
End: 2019-05-09

## 2019-05-23 ENCOUNTER — RX RENEWAL (OUTPATIENT)
Age: 64
End: 2019-05-23

## 2019-06-12 ENCOUNTER — APPOINTMENT (OUTPATIENT)
Dept: CARDIOLOGY | Facility: CLINIC | Age: 64
End: 2019-06-12
Payer: MEDICARE

## 2019-06-12 ENCOUNTER — NON-APPOINTMENT (OUTPATIENT)
Age: 64
End: 2019-06-12

## 2019-06-12 VITALS — SYSTOLIC BLOOD PRESSURE: 142 MMHG | DIASTOLIC BLOOD PRESSURE: 80 MMHG

## 2019-06-12 VITALS
HEIGHT: 66 IN | WEIGHT: 238 LBS | BODY MASS INDEX: 38.25 KG/M2 | DIASTOLIC BLOOD PRESSURE: 91 MMHG | RESPIRATION RATE: 14 BRPM | HEART RATE: 56 BPM | OXYGEN SATURATION: 99 % | SYSTOLIC BLOOD PRESSURE: 166 MMHG

## 2019-06-12 PROCEDURE — 99214 OFFICE O/P EST MOD 30 MIN: CPT

## 2019-06-12 PROCEDURE — 93000 ELECTROCARDIOGRAM COMPLETE: CPT

## 2019-06-12 RX ORDER — MULTIVIT-MIN/FA/LYCOPEN/LUTEIN .4-300-25
TABLET ORAL
Refills: 0 | Status: ACTIVE | COMMUNITY

## 2019-06-12 NOTE — HISTORY OF PRESENT ILLNESS
[FreeTextEntry1] : Patient is a 65yo M with h/o mild CAD, HTN, COPD, MARY here for cardiac follow up. Patient has been doing well without any chest pain or shortness of breath. Overall feeling well. Mild sore/pain knees. Patient denies PND/orthopnea/edema/palpitations/syncope/claudication. No new complaints since last visit. Works at [employer] nights part time. Daytime occasionally works as .  Stress as well due to ill sister in nursing home. BP at night at home < 120. Expecting 5th grandchild soon. \par \par ROS: GI and  negative

## 2019-06-12 NOTE — PHYSICAL EXAM
[General Appearance - Well Developed] : well developed [General Appearance - Well Nourished] : well nourished [General Appearance - In No Acute Distress] : no acute distress [Normal Conjunctiva] : the conjunctiva exhibited no abnormalities [Normal Oral Mucosa] : normal oral mucosa [Normal Jugular Venous V Waves Present] : normal jugular venous V waves present [] : no respiratory distress [Respiration, Rhythm And Depth] : normal respiratory rhythm and effort [Auscultation Breath Sounds / Voice Sounds] : lungs were clear to auscultation bilaterally [Heart Rate And Rhythm] : heart rate and rhythm were normal [Heart Sounds] : normal S1 and S2 [Murmurs] : no murmurs present [Bowel Sounds] : normal bowel sounds [Abdomen Soft] : soft [Abdomen Tenderness] : non-tender [Abdomen Mass (___ Cm)] : no abdominal mass palpated [Abnormal Walk] : normal gait [Nail Clubbing] : no clubbing of the fingernails [Cyanosis, Localized] : no localized cyanosis [Skin Color & Pigmentation] : normal skin color and pigmentation [Skin Turgor] : normal skin turgor [Oriented To Time, Place, And Person] : oriented to person, place, and time [Affect] : the affect was normal [FreeTextEntry1] : no edema

## 2019-06-12 NOTE — ASSESSMENT
[FreeTextEntry1] : ECG: SB, leftward axis \par \par BW 6/2018:  HDL 69 LDL 46 TG 61\par \par CAROTID DUPLEX 11/2018:\par 1. All arteries were clearly visualized.\par 2. There is 1-49% stenosis of the left proximal ICA.\par 3. There is 1-49% stenosis of the right proximal ICA.\par 4. There is antegrade flow in the right and left vertebral arteries.\par 5. Recommend clinical correlation with the above findings.\par \par ECHO 11/2018:\par 1. Technically difficult study due to body habitus.\par 2. Normal left ventricular internal cavity size.\par 3. Normal global left ventricular systolic function.\par 4. Left ventricular ejection fraction, by visual estimation, is 55 to 60%.\par 5. Normal right ventricular size and systolic function.\par 6. Mildly dilated left atrium.\par 7. The right atrium is normal in size and structure.\par 8. Mild aortic valve sclerosis without stenosis.\par 9. Trace aortic regurgitation.\par 10. Structurally normal mitral valve. No evidence of mitral stenosis or significant regurgitation.\par 11. Inadequate TR jet to evaluate RVSP.\par 12. There is mild dilatation of the ascending aorta. Asc Ao 4.0cm\par 13. There is lipomatous hypertrophy of the interatrial septum, without evidence of shunting.\par 14. Recommend clinical correlation with the above findings.\par \par PHARM NUCLEAR STRESS TSET 5/2018:\par 1. Normal Sestamibi myocardial perfusion SPECT imaging with artifact as noted above.\par 2. Due to artifact, the sensitivity and specificity of the test are reduced.\par 3. Left ventricular function was normal with an EF of 67%.\par 4. The EKG was negative for ischemia.\par 5. The blood pressure response was hypertensive baseline with normal response to Regadenason.\par 6. The patient developed occasional PVCs during exercise.\par 7. The patient developed shortness of breath during stress that resolved with rest.\par 8. The test was terminated due to completion of the protocol.\par 9. When compared to prior study dated 2017, there is no longer evidence of ischemia.\par 10. Recommend clinical correlation with the above findings.

## 2019-06-12 NOTE — DISCUSSION/SUMMARY
[FreeTextEntry1] : Patient is a 63yo M with mild non-obstructive CAD (coronary calcifications seen on CT), HTN,HLD, obesity MARY here for cardiac follow up. Stress testing last year showed no evidence of ischemia and preserved LV systolic function. Echo showed normal biventricular function and no clinically significant valvular abnormalities, mild stable aortic ectasia as well needs continued surveillance and BP control. Carotid with mild disease, needs med management. \par \par 1. Continue medical management of CAD\par 2. Continue current antihypertensives, blood pressure is well controlled Has white coat htn and will perform another 2 week log at home and call with results. In [facility] all BP well controlled at home mostly under 120 systolic \par 3. Recommend aggressive diet and lifestyle modifications, counselled on weight loss. needs to cut out snacking \par 4. Recommend 30 minutes moderate intensity aerobic activity 5 days per week as tolerated by knees\par 5. CPAP for MARY\par 6. Follow up 6 months\par 7. Lipids at goal last year, recheck with PMD, continue current statin dose\par 8. Repeat echo/carotid 6 months for surveillance carotid disease and aortic ectasia

## 2019-10-26 ENCOUNTER — RX RENEWAL (OUTPATIENT)
Age: 64
End: 2019-10-26

## 2019-10-30 ENCOUNTER — APPOINTMENT (OUTPATIENT)
Dept: PULMONOLOGY | Facility: CLINIC | Age: 64
End: 2019-10-30
Payer: MEDICARE

## 2019-10-30 VITALS
WEIGHT: 239 LBS | OXYGEN SATURATION: 97 % | BODY MASS INDEX: 39.34 KG/M2 | DIASTOLIC BLOOD PRESSURE: 80 MMHG | SYSTOLIC BLOOD PRESSURE: 136 MMHG | HEART RATE: 54 BPM | HEIGHT: 65.5 IN

## 2019-10-30 PROCEDURE — 99214 OFFICE O/P EST MOD 30 MIN: CPT

## 2019-10-30 RX ORDER — FLURBIPROFEN 100 MG
100 TABLET ORAL
Refills: 0 | Status: DISCONTINUED | COMMUNITY
End: 2019-10-30

## 2019-10-30 RX ORDER — NAPROXEN 500 MG/1
500 TABLET ORAL
Qty: 14 | Refills: 0 | Status: DISCONTINUED | COMMUNITY
Start: 2019-10-04 | End: 2019-10-30

## 2019-10-30 RX ORDER — SAW PALMETTO 250 MG
3-1 CAPSULE ORAL
Refills: 0 | Status: DISCONTINUED | COMMUNITY
Start: 2018-11-06 | End: 2019-10-30

## 2019-10-30 NOTE — ASSESSMENT
[FreeTextEntry1] : Obstructive sleep apnea initially very severe with significant improvement with weight loss but remaining on BiPAP. He shows excellent compliance and benefit. No new respiratory issues.\par \par CPAP supplies renewed. Followup will be in one year. The patient may be going for knee replacement over the next year and I told him to return for clearance if needed.

## 2019-10-30 NOTE — REVIEW OF SYSTEMS
[Fatigue] : no fatigue [Recent Wt Loss (___ Lbs)] : recent [unfilled] ~Ulb weight loss [Edema] : ~T edema was present [As Noted in HPI] : as noted in HPI [Negative] : Pulmonary Hypertension

## 2019-10-30 NOTE — HISTORY OF PRESENT ILLNESS
[FreeTextEntry1] : The patient is feeling well with excellent compliance and benefit from BiPAP. His weight is creeping up but he still 40 pounds below where it was when we started. Compliance is excellent with use greater than 4 hours on 100% of the night. He is averaging 6-1/2 hours of use per night. Residual AHI is 2.1. Denies shortness of breath and remains off medication

## 2019-10-30 NOTE — PHYSICAL EXAM
[FreeTextEntry1] : morbidly obese [Normal Conjunctiva] : the conjunctiva exhibited no abnormalities [Eyelids - No Xanthelasma] : the eyelids demonstrated no xanthelasmas [Low Lying Soft Palate] : low lying soft palate [Elongated Uvula] : elongated uvula [Enlarged Base of the Tongue] : enlargement of the base of the tongue [IV] : IV [Neck Circumference: ___] : neck circumference is [unfilled] [Heart Rate And Rhythm] : heart rate and rhythm were normal [Heart Sounds] : normal S1 and S2 [Murmurs] : no murmurs present [Abdomen Soft] : soft [Abdomen Tenderness] : non-tender [Abdomen Mass (___ Cm)] : no abdominal mass palpated [Abnormal Walk] : normal gait [Gait - Sufficient For Exercise Testing] : the gait was sufficient for exercise testing [Nail Clubbing] : no clubbing of the fingernails [Cyanosis, Localized] : no localized cyanosis [Petechial Hemorrhages (___cm)] : no petechial hemorrhages [1+ Pitting] : 1+  pitting [Skin Color & Pigmentation] : normal skin color and pigmentation [] : no rash [No Venous Stasis] : no venous stasis [Skin Lesions] : no skin lesions [No Skin Ulcers] : no skin ulcer [No Xanthoma] : no  xanthoma was observed [Deep Tendon Reflexes (DTR)] : deep tendon reflexes were 2+ and symmetric [Sensation] : the sensory exam was normal to light touch and pinprick [No Focal Deficits] : no focal deficits [Oriented To Time, Place, And Person] : oriented to person, place, and time [Impaired Insight] : insight and judgment were intact [Affect] : the affect was normal [Normal Rate] : the respiratory rate was normal [Normal Rhythm/Effort] : normal respiratory rhythm and effort [Clear Bilaterally] : the lungs were clear to auscultation bilaterally [Normal to Percussion] : the lungs were normal to percussion [Normal] : palpation of the chest was normal

## 2019-11-14 ENCOUNTER — RX RENEWAL (OUTPATIENT)
Age: 64
End: 2019-11-14

## 2019-11-18 ENCOUNTER — APPOINTMENT (OUTPATIENT)
Dept: CARDIOLOGY | Facility: CLINIC | Age: 64
End: 2019-11-18
Payer: MEDICARE

## 2019-11-18 PROCEDURE — 93880 EXTRACRANIAL BILAT STUDY: CPT

## 2019-11-18 PROCEDURE — 93306 TTE W/DOPPLER COMPLETE: CPT

## 2020-01-15 ENCOUNTER — NON-APPOINTMENT (OUTPATIENT)
Age: 65
End: 2020-01-15

## 2020-01-15 ENCOUNTER — APPOINTMENT (OUTPATIENT)
Dept: CARDIOLOGY | Facility: CLINIC | Age: 65
End: 2020-01-15
Payer: MEDICARE

## 2020-01-15 VITALS
HEIGHT: 65.5 IN | BODY MASS INDEX: 40.49 KG/M2 | DIASTOLIC BLOOD PRESSURE: 80 MMHG | SYSTOLIC BLOOD PRESSURE: 128 MMHG | WEIGHT: 246 LBS | RESPIRATION RATE: 14 BRPM | HEART RATE: 51 BPM

## 2020-01-15 PROCEDURE — 99214 OFFICE O/P EST MOD 30 MIN: CPT

## 2020-01-15 PROCEDURE — 93000 ELECTROCARDIOGRAM COMPLETE: CPT

## 2020-01-15 RX ORDER — SERTRALINE HYDROCHLORIDE 100 MG/1
100 TABLET, FILM COATED ORAL
Refills: 0 | Status: ACTIVE | COMMUNITY
Start: 2018-05-19

## 2020-01-15 NOTE — DISCUSSION/SUMMARY
[FreeTextEntry1] : Patient is a 65yo M with mild non-obstructive CAD (coronary calcifications seen on CT), HTN,HLD, obesity MARY, mild carotid stenosis here for cardiac follow up.  Echo showed normal biventricular function and no clinically significant valvular abnormalities, mild/borderline stable aortic ectasia as well needs continued surveillance and BP control. Carotid with mild disease, needs med management and surveillance. \par \par 1. Continue medical management of CAD/carotid stenosis\par 2. Continue current antihypertensives, blood pressure is well controlled \par 3. Recommend aggressive diet and lifestyle modifications, counselled on weight loss. \par 4. Recommend 30 minutes moderate intensity aerobic activity 5 days per week as tolerated by knees\par 5. CPAP for MARY\par 6. Continue current statin dose, lipids at goal summer 2019\par 7. Follow up 1 year

## 2020-01-15 NOTE — PHYSICAL EXAM
[General Appearance - Well Developed] : well developed [General Appearance - Well Nourished] : well nourished [Normal Conjunctiva] : the conjunctiva exhibited no abnormalities [General Appearance - In No Acute Distress] : no acute distress [Normal Oral Mucosa] : normal oral mucosa [Normal Jugular Venous V Waves Present] : normal jugular venous V waves present [Respiration, Rhythm And Depth] : normal respiratory rhythm and effort [Auscultation Breath Sounds / Voice Sounds] : lungs were clear to auscultation bilaterally [] : no respiratory distress [Heart Sounds] : normal S1 and S2 [Heart Rate And Rhythm] : heart rate and rhythm were normal [Bowel Sounds] : normal bowel sounds [Abdomen Soft] : soft [Murmurs] : no murmurs present [Abdomen Mass (___ Cm)] : no abdominal mass palpated [Abdomen Tenderness] : non-tender [Cyanosis, Localized] : no localized cyanosis [Nail Clubbing] : no clubbing of the fingernails [Abnormal Walk] : normal gait [Skin Color & Pigmentation] : normal skin color and pigmentation [Oriented To Time, Place, And Person] : oriented to person, place, and time [Skin Turgor] : normal skin turgor [Affect] : the affect was normal [FreeTextEntry1] : obese

## 2020-01-15 NOTE — HISTORY OF PRESENT ILLNESS
[FreeTextEntry1] : Patient is a 63yo M with h/o mild CAD, HTN, COPD, MARY here for cardiac follow up. Patient has been doing well without any chest pain or shortness of breath. Overall feeling well. Patient denies PND/orthopnea/edema/palpitations/syncope/claudication. No new complaints since last visit. Able to walk without symptoms but no regular/strenuous exercise. HAs gained weight due to stress he sattes.  \par \par Works at [employer] part time. Daytime occasionally works as .  Stress as well due to ill sister in nursing home who recently passed. \par \par ROS: GI and  negative

## 2020-01-15 NOTE — ASSESSMENT
[FreeTextEntry1] : ECG: SB, no ST-T abnormalities \par \par  HDL 64 LDL 59 TG 67 (8/2019) \par  HDL 69 LDL 46 TG 61 (6/2018) \par \par CAROTID DUPLEX 11/2019:\par 1. There is 1-49% stenosis of the left proximal ICA.\par 2. There is 1-49% stenosis of the right proximal ICA.\par 3. There is antegrade flow in the right and left vertebral arteries.\par 4. No change compared to 2018 study\par \par ECHO 11/2019:\par 1. Technically difficult study due to body habitus.\par 2. Normal LV size, systolic and diastolic function. EF 60-65%\par 3. Normal RV/LA/RA \par 4. There is borderline dilatation of the ascending aorta. Asc Ao 3.9cm\par \par \par PHARM NUCLEAR STRESS TSET 5/2018:\par 1. Normal Sestamibi myocardial perfusion SPECT imaging with artifact as noted above.\par 2. Left ventricular function was normal with an EF of 67%.\par 3. The EKG was negative for ischemia.\par 4. The blood pressure response was hypertensive baseline with normal response to Regadenason.\par 5. The patient developed occasional PVCs \par 6. When compared to prior study dated 2017, there is no longer evidence of ischemia.\par

## 2020-04-07 ENCOUNTER — RX RENEWAL (OUTPATIENT)
Age: 65
End: 2020-04-07

## 2020-07-14 NOTE — PROCEDURE NOTE - NSTIMEOUT_GEN_A_CORE
Was fine yesterday.   Started 2am this morning, all of a sudden, sore throat, chills, felt bad.   Denies n/v/d. No thermometer but does get hot/cold. No loss of taste or smell. Coughing d/t smoking.   Pt states that this is how she feels when she gets the flu. Advised pt that if she wanted to be seen needs to go to . Pt questioned if she needed to come in or can she see how it goes for a few days? Pt was also given number for COVID-19 testing if she wishes to be tested for COVID.    Patient's first and last name, , procedure, and correct site confirmed prior to the start of procedure.

## 2020-07-17 ENCOUNTER — APPOINTMENT (OUTPATIENT)
Dept: ORTHOPEDIC SURGERY | Facility: CLINIC | Age: 65
End: 2020-07-17
Payer: MEDICARE

## 2020-07-17 VITALS
HEART RATE: 79 BPM | HEIGHT: 66 IN | BODY MASS INDEX: 24.91 KG/M2 | SYSTOLIC BLOOD PRESSURE: 160 MMHG | TEMPERATURE: 97 F | DIASTOLIC BLOOD PRESSURE: 96 MMHG | WEIGHT: 155 LBS

## 2020-07-17 DIAGNOSIS — M19.011 PRIMARY OSTEOARTHRITIS, RIGHT SHOULDER: ICD-10-CM

## 2020-07-17 DIAGNOSIS — M67.911 UNSPECIFIED DISORDER OF SYNOVIUM AND TENDON, RIGHT SHOULDER: ICD-10-CM

## 2020-07-17 DIAGNOSIS — M19.012 PRIMARY OSTEOARTHRITIS, RIGHT SHOULDER: ICD-10-CM

## 2020-07-17 DIAGNOSIS — M67.912 UNSPECIFIED DISORDER OF SYNOVIUM AND TENDON, LEFT SHOULDER: ICD-10-CM

## 2020-07-17 PROCEDURE — 20610 DRAIN/INJ JOINT/BURSA W/O US: CPT | Mod: 50

## 2020-07-17 PROCEDURE — 99214 OFFICE O/P EST MOD 30 MIN: CPT | Mod: 25

## 2020-07-17 PROCEDURE — 73030 X-RAY EXAM OF SHOULDER: CPT | Mod: 50

## 2020-09-25 ENCOUNTER — TRANSCRIPTION ENCOUNTER (OUTPATIENT)
Age: 65
End: 2020-09-25

## 2020-11-03 ENCOUNTER — APPOINTMENT (OUTPATIENT)
Dept: PULMONOLOGY | Facility: CLINIC | Age: 65
End: 2020-11-03
Payer: MEDICARE

## 2020-11-03 VITALS — DIASTOLIC BLOOD PRESSURE: 70 MMHG | OXYGEN SATURATION: 96 % | SYSTOLIC BLOOD PRESSURE: 140 MMHG | HEART RATE: 60 BPM

## 2020-11-03 VITALS — BODY MASS INDEX: 40.35 KG/M2 | WEIGHT: 250 LBS

## 2020-11-03 VITALS — RESPIRATION RATE: 16 BRPM

## 2020-11-03 PROCEDURE — 99214 OFFICE O/P EST MOD 30 MIN: CPT

## 2020-11-03 RX ORDER — GLUCOSAMINE HCL/CHONDROITIN SU 500-400 MG
3 CAPSULE ORAL
Refills: 0 | Status: DISCONTINUED | COMMUNITY
End: 2020-11-03

## 2020-11-03 NOTE — ASSESSMENT
[FreeTextEntry1] : Patient with severe obstructive sleep apnea with excellent compliance and benefit from auto BiPAP. Mild COPD. Patient is cleared for dental surgery. Supplies renewed. Follow up one year.

## 2020-11-03 NOTE — HISTORY OF PRESENT ILLNESS
[TextBox_4] : Doing well on auto BiPAP. He is going for multiple tooth extraction next month. He'll be under sedation for about 90 minutes. [Obstructive Sleep Apnea] : obstructive sleep apnea [Home] : home [BPAP:] : BPAP [TextBox_100] : 2/17 [TextBox_108] : 115 [TextBox_112] : 97 [TextBox_116] : 77 [TextBox_135] : auto  pap mac 20, epap min 8 ps5 [TextBox_127] : 9/20 [TextBox_129] : 10/20 [TextBox_133] : 100 [TextBox_137] : 100 [TextBox_141] : 7 [TextBox_143] : 34 [TextBox_147] : 2.0 [ESS] : 5

## 2020-11-03 NOTE — END OF VISIT
[Time Spent: ___ minutes] : I have spent [unfilled] minutes of time on the encounter. Anemia, Iron Deficiency    Atrial fibrillation    Degenerative Joint Disease    Diabetes mellitus, type II    H/O: GI Bleed    Hiatal hernia    Hypertension    Hypothyroidism    Obesity    Overactive Bladder    Peripheral Neuropathy    Spinal stenosis of lumbar region    Uterine Polyp

## 2020-11-11 ENCOUNTER — NON-APPOINTMENT (OUTPATIENT)
Age: 65
End: 2020-11-11

## 2020-11-11 ENCOUNTER — APPOINTMENT (OUTPATIENT)
Dept: CARDIOLOGY | Facility: CLINIC | Age: 65
End: 2020-11-11
Payer: MEDICARE

## 2020-11-11 VITALS
BODY MASS INDEX: 40.82 KG/M2 | SYSTOLIC BLOOD PRESSURE: 128 MMHG | TEMPERATURE: 98.1 F | DIASTOLIC BLOOD PRESSURE: 84 MMHG | HEART RATE: 58 BPM | RESPIRATION RATE: 16 BRPM | WEIGHT: 254 LBS | HEIGHT: 66 IN

## 2020-11-11 PROCEDURE — 93000 ELECTROCARDIOGRAM COMPLETE: CPT

## 2020-11-11 PROCEDURE — 99214 OFFICE O/P EST MOD 30 MIN: CPT

## 2020-11-11 NOTE — HISTORY OF PRESENT ILLNESS
[FreeTextEntry1] : Patient is a 64yo M with h/o mild CAD, HTN, COPD, MARY here for cardiac follow up. Patient has been doing well without any chest pain or shortness of breath. Overall feeling well. Patient denies PND/orthopnea/edema/palpitations/syncope/claudication. Patient going for dental extraction later this month. Limited by knee pain, states in need of replacement bilaterally. CAn walk up stairs without SOB, takes his time due to knee pain. \par \par Works at [employer] part time. Daytime occasionally works as .  \par \par ROS: GI and  negative

## 2020-11-11 NOTE — ASSESSMENT
[FreeTextEntry1] : ECG: SB, LAFB \par \par  HDL 64 LDL 59 TG 67 (8/2019) \par  HDL 69 LDL 46 TG 61 (6/2018) \par \par CAROTID DUPLEX 11/2019:\par 1. There is 1-49% stenosis of the left proximal ICA.\par 2. There is 1-49% stenosis of the right proximal ICA.\par 3. There is antegrade flow in the right and left vertebral arteries.\par 4. No change compared to 2018 study\par \par ECHO 11/2019:\par 1. Technically difficult study due to body habitus.\par 2. Normal LV size, systolic and diastolic function. EF 60-65%\par 3. Normal RV/LA/RA \par 4. There is borderline dilatation of the ascending aorta. Asc Ao 3.9cm\par \par \par PHARM NUCLEAR STRESS TSET 5/2018:\par 1. Normal Sestamibi myocardial perfusion SPECT imaging with artifact as noted above.\par 2. Left ventricular function was normal with an EF of 67%.\par 3. The EKG was negative for ischemia.\par 4. The blood pressure response was hypertensive baseline with normal response to Regadenason.\par 5. The patient developed occasional PVCs \par 6. When compared to prior study dated 2017, there is no longer evidence of ischemia.\par

## 2020-11-11 NOTE — PHYSICAL EXAM
[General Appearance - Well Developed] : well developed [General Appearance - Well Nourished] : well nourished [General Appearance - In No Acute Distress] : no acute distress [Normal Conjunctiva] : the conjunctiva exhibited no abnormalities [Normal Oral Mucosa] : normal oral mucosa [Normal Jugular Venous V Waves Present] : normal jugular venous V waves present [] : no respiratory distress [Respiration, Rhythm And Depth] : normal respiratory rhythm and effort [Auscultation Breath Sounds / Voice Sounds] : lungs were clear to auscultation bilaterally [Heart Rate And Rhythm] : heart rate and rhythm were normal [Heart Sounds] : normal S1 and S2 [Murmurs] : no murmurs present [Edema] : no peripheral edema present [Bowel Sounds] : normal bowel sounds [Abdomen Soft] : soft [Abdomen Tenderness] : non-tender [Abdomen Mass (___ Cm)] : no abdominal mass palpated [Nail Clubbing] : no clubbing of the fingernails [Cyanosis, Localized] : no localized cyanosis [Skin Color & Pigmentation] : normal skin color and pigmentation [Skin Turgor] : normal skin turgor [Oriented To Time, Place, And Person] : oriented to person, place, and time [Affect] : the affect was normal [FreeTextEntry1] : no edema

## 2020-11-11 NOTE — DISCUSSION/SUMMARY
[FreeTextEntry1] : Patient is a 64yo M with mild non-obstructive CAD (coronary calcifications seen on CT), HTN,HLD, obesity MARY, mild carotid stenosis here for cardiac follow up.   Stable, limited by knees but able to walk without CP/SOB. \par \par 1. Continue medical management of CAD/carotid stenosis, surveillance in new year with stress testing, echo and carotid duplex\par 2. Continue current antihypertensives, blood pressure is well controlled \par 3. Recommend aggressive diet and lifestyle modifications, counselled on weight loss. \par 4. Recommend 30 minutes moderate intensity aerobic activity 5 days per week as tolerated by knees\par 5. CPAP for MARY, pulm follow up \par 6. Patient is at low cardiovascular risk for low risk surgery (dental extractions), may hold ASA 1 week prior and restart post\par 7. FOllow up 3 months \par

## 2020-12-22 NOTE — DISCHARGE NOTE ADULT - BECAUSE OF A PHYSICAL, MENTAL OR EMOTIONAL CONDITION, DO YOU HAVE DIFFICULTY DOING  ERRANDS ALONE LIKE VISITING A DOCTOR'S OFFICE OR SHOPPING (15 YEARS AND OLDER)
Problem List Items Addressed This Visit        Circulatory    HTN (hypertension) - Primary     Has been under control and dr dupont wanted to decrease the meds for 2 weeks prior to coming in for a recheck --was on 5 mg and now 2.5 mg    The blood pressure is fine but would continue due to elevated blood sugars and kidney  protection          Relevant Medications    lisinopril (ZESTRIL) 2.5 MG tablet    Other Relevant Orders    CBC WITH DIFFERENTIAL    COMPREHENSIVE METABOLIC PANEL    GLYCOHEMOGLOBIN    THYROID STIMULATING HORMONE REFLEX    LIPID PANEL WITHOUT REFLEX    CBC WITH AUTOMATED DIFFERENTIAL (PERFORMABLE ONLY)       Integumentary    Axillary hyperhidrosis    Relevant Medications    aluminum chloride (Drysol) 20 % topical solution       Endocrine    Prediabetes     Also trying to work on weight and using the metformin and  wellbutrin to lose weight --not exercising outside --only inside and not working     Has a heavy cuisine with bread and rice --need to see a dietician and endocrinologist          Relevant Orders    SERVICE TO NUTRITION COUNSELING    SERVICE TO ENDOCRINOLOGY    CBC WITH DIFFERENTIAL    COMPREHENSIVE METABOLIC PANEL    GLYCOHEMOGLOBIN    THYROID STIMULATING HORMONE REFLEX    LIPID PANEL WITHOUT REFLEX    CBC WITH AUTOMATED DIFFERENTIAL (PERFORMABLE ONLY)       Other    BMI 37.0-37.9, adult     Has an elevated weight for a long time   Is not going outside to exercise but has a treadmill and a sit up device --goes on the treadmill 30-40 minutes 3 time per week at 3.5 miles per hour          Relevant Orders    SERVICE TO NUTRITION COUNSELING    SERVICE TO ENDOCRINOLOGY    History of 2019 novel coronavirus disease (COVID-19)     Positive in august 2020 and antibody positive in October 2020  Recheck antibodies today            Other Visit Diagnoses     Encounter for observation for suspected exposure to other biological agents ruled out        Relevant Orders    SARS-COV-2 IGG         No

## 2021-01-14 ENCOUNTER — APPOINTMENT (OUTPATIENT)
Dept: CARDIOLOGY | Facility: CLINIC | Age: 66
End: 2021-01-14
Payer: MEDICARE

## 2021-01-14 PROCEDURE — 93015 CV STRESS TEST SUPVJ I&R: CPT

## 2021-01-14 PROCEDURE — 78452 HT MUSCLE IMAGE SPECT MULT: CPT

## 2021-01-14 PROCEDURE — A9500: CPT

## 2021-01-14 RX ORDER — REGADENOSON 0.08 MG/ML
0.4 INJECTION, SOLUTION INTRAVENOUS
Qty: 4 | Refills: 0 | Status: COMPLETED | OUTPATIENT
Start: 2021-01-14

## 2021-01-14 RX ADMIN — REGADENOSON 0 MG/5ML: 0.08 INJECTION, SOLUTION INTRAVENOUS at 00:00

## 2021-01-20 ENCOUNTER — APPOINTMENT (OUTPATIENT)
Dept: CARDIOLOGY | Facility: CLINIC | Age: 66
End: 2021-01-20

## 2021-01-28 ENCOUNTER — APPOINTMENT (OUTPATIENT)
Dept: CARDIOLOGY | Facility: CLINIC | Age: 66
End: 2021-01-28
Payer: MEDICARE

## 2021-01-28 PROCEDURE — 93306 TTE W/DOPPLER COMPLETE: CPT

## 2021-01-28 PROCEDURE — 93880 EXTRACRANIAL BILAT STUDY: CPT

## 2021-01-28 RX ORDER — PERFLUTREN 6.52 MG/ML
6.52 INJECTION, SUSPENSION INTRAVENOUS
Qty: 2 | Refills: 0 | Status: COMPLETED | OUTPATIENT
Start: 2021-01-28

## 2021-01-28 RX ORDER — VALSARTAN 160 MG/1
160 TABLET, COATED ORAL DAILY
Refills: 0 | Status: DISCONTINUED | COMMUNITY
End: 2021-01-28

## 2021-01-28 RX ADMIN — PERFLUTREN MG/ML: 6.52 INJECTION, SUSPENSION INTRAVENOUS at 00:00

## 2021-02-12 ENCOUNTER — APPOINTMENT (OUTPATIENT)
Dept: CARDIOLOGY | Facility: CLINIC | Age: 66
End: 2021-02-12
Payer: MEDICARE

## 2021-02-12 VITALS
SYSTOLIC BLOOD PRESSURE: 165 MMHG | WEIGHT: 245 LBS | DIASTOLIC BLOOD PRESSURE: 86 MMHG | HEART RATE: 58 BPM | HEIGHT: 66 IN | BODY MASS INDEX: 39.37 KG/M2 | RESPIRATION RATE: 16 BRPM | TEMPERATURE: 95.7 F

## 2021-02-12 VITALS — DIASTOLIC BLOOD PRESSURE: 80 MMHG | SYSTOLIC BLOOD PRESSURE: 130 MMHG

## 2021-02-12 PROCEDURE — 99214 OFFICE O/P EST MOD 30 MIN: CPT

## 2021-02-23 ENCOUNTER — RX RENEWAL (OUTPATIENT)
Age: 66
End: 2021-02-23

## 2021-03-02 RX ORDER — KIT FOR THE PREPARATION OF TECHNETIUM TC99M SESTAMIBI 1 MG/5ML
INJECTION, POWDER, LYOPHILIZED, FOR SOLUTION PARENTERAL
Refills: 0 | Status: COMPLETED | OUTPATIENT
Start: 2021-03-02

## 2021-03-02 RX ADMIN — KIT FOR THE PREPARATION OF TECHNETIUM TC99M SESTAMIBI 0: 1 INJECTION, POWDER, LYOPHILIZED, FOR SOLUTION PARENTERAL at 00:00

## 2021-04-27 NOTE — PHYSICAL EXAM
[General Appearance - Well Developed] : well developed [General Appearance - Well Nourished] : well nourished [General Appearance - In No Acute Distress] : no acute distress [Normal Conjunctiva] : the conjunctiva exhibited no abnormalities [Normal Oral Mucosa] : normal oral mucosa [Normal Jugular Venous V Waves Present] : normal jugular venous V waves present [] : no respiratory distress [Respiration, Rhythm And Depth] : normal respiratory rhythm and effort [Heart Rate And Rhythm] : heart rate and rhythm were normal [Auscultation Breath Sounds / Voice Sounds] : lungs were clear to auscultation bilaterally [Heart Sounds] : normal S1 and S2 [Murmurs] : no murmurs present [Edema] : no peripheral edema present [Bowel Sounds] : normal bowel sounds [Abdomen Soft] : soft [Abdomen Tenderness] : non-tender [Abdomen Mass (___ Cm)] : no abdominal mass palpated [Nail Clubbing] : no clubbing of the fingernails [Cyanosis, Localized] : no localized cyanosis [Skin Color & Pigmentation] : normal skin color and pigmentation [Skin Turgor] : normal skin turgor [Oriented To Time, Place, And Person] : oriented to person, place, and time [Affect] : the affect was normal [FreeTextEntry1] : no edema

## 2021-04-27 NOTE — ASSESSMENT
[FreeTextEntry1] : ECG: SB, LAFB \par \par  HDL 60 LDL 56 TG 63 (11/2020)\par  HDL 64 LDL 59 TG 67 (8/2019) \par  HDL 69 LDL 46 TG 61 (6/2018) \par \par CAROTID 1/2021:\par 1. 1-49% ICA stenosis bilaterally\par 2. Antegrade flow in the vertebral arteries bilaterally\par \par ECHO 1/2021:\par 1. Normal LV size, systolic and diastolic function. EF 60-65%\par 2. Borderline LAE\par 3. Normal RV/RA\par 4. Mild AI\par 5. Borderline dilated aorta at 3.9cm\par \par PHARM NUCLEAR STRESS TEST 1/2021\par 1. Negative for ischemia/infarct, EF 62%\par 2. BP hypertensive at baseline, normal response \par \par CAROTID DUPLEX 11/2019:\par 1. There is 1-49% stenosis of the left proximal ICA.\par 2. There is 1-49% stenosis of the right proximal ICA.\par 3. There is antegrade flow in the right and left vertebral arteries.\par 4. No change compared to 2018 study\par \par ECHO 11/2019:\par 1. Technically difficult study due to body habitus.\par 2. Normal LV size, systolic and diastolic function. EF 60-65%\par 3. Normal RV/LA/RA \par 4. There is borderline dilatation of the ascending aorta. Asc Ao 3.9cm\par \par \par PHARM NUCLEAR STRESS TSET 5/2018:\par 1. Normal Sestamibi myocardial perfusion SPECT imaging with artifact as noted above.\par 2. Left ventricular function was normal with an EF of 67%.\par 3. The EKG was negative for ischemia.\par 4. The blood pressure response was hypertensive baseline with normal response to Regadenason.\par 5. The patient developed occasional PVCs \par 6. When compared to prior study dated 2017, there is no longer evidence of ischemia.\par 
0 = understands/communicates without difficulty

## 2021-04-27 NOTE — ADDENDUM
[FreeTextEntry1] : 4/27/21: No new cardiac symptoms. Given recent negative stress test and preserved biventricular function he is at low CV risk for colonoscopy/EGD. May hold ASA 1 week prior and restart post.

## 2021-04-27 NOTE — HISTORY OF PRESENT ILLNESS
[FreeTextEntry1] : Patient is a 64yo M with h/o mild CAD, HTN, COPD, MARY here for cardiac follow up. Patient has been doing well without any chest pain or shortness of breath. Overall feeling well without new complaints. Patient denies PND/orthopnea/edema/palpitations/syncope/claudication.  Limited by knee pain, states in need of replacement bilaterally. CAn walk up stairs without SOB, takes his time due to knee pain. Patient underwent cardiac testing after last visit. Valsartan increased by PMD. BP at home runs 130-140s/80s. \par \par Works at [employer] part time. Daytime occasionally works as .  \par \par ROS: GI and  negative

## 2021-04-27 NOTE — DISCUSSION/SUMMARY
[FreeTextEntry1] : Patient is a 64yo M with mild non-obstructive CAD (coronary calcifications seen on CT), HTN,HLD, obesity MARY, mild carotid stenosis here for cardiac follow up.\par -Carotid with stable disease 1/2021, med management \par -Echo 1/2021 with preserved BiV function, aorta only borderline dilated. Mild AI, surveillance only\par -Nuclear stress testing 1/2021 without ischemia \par \par 1. Continue medical management of CAD/carotid stenosis\par 2. Continue current antihypertensives, blood pressure borderline. Follow up with PMD Dr. Mcknight \par 3. Recommend aggressive diet and lifestyle modifications, counselled on weight loss. \par 4. Recommend 30 minutes moderate intensity aerobic activity 5 days per week as tolerated by knees\par 5. CPAP for MARY, pulm follow up \par 6. Follow up 6 months\par 7. Low risk for dental work , needs recurrent work done. \par

## 2021-05-12 ENCOUNTER — NON-APPOINTMENT (OUTPATIENT)
Age: 66
End: 2021-05-12

## 2021-05-20 ENCOUNTER — APPOINTMENT (OUTPATIENT)
Dept: PULMONOLOGY | Facility: CLINIC | Age: 66
End: 2021-05-20
Payer: MEDICARE

## 2021-05-20 VITALS — WEIGHT: 235 LBS | DIASTOLIC BLOOD PRESSURE: 70 MMHG | SYSTOLIC BLOOD PRESSURE: 120 MMHG | BODY MASS INDEX: 37.93 KG/M2

## 2021-05-20 VITALS — HEART RATE: 62 BPM | RESPIRATION RATE: 16 BRPM | OXYGEN SATURATION: 98 %

## 2021-05-20 PROCEDURE — 99214 OFFICE O/P EST MOD 30 MIN: CPT

## 2021-05-20 NOTE — ASSESSMENT
[FreeTextEntry1] : The patient has very severe sleep apnea with excellent compliance and benefit from CPAP. Supplies were renewed. Followup one year.\par Patient with mild COPD not needing treatment.\par

## 2021-05-20 NOTE — HISTORY OF PRESENT ILLNESS
[TextBox_4] : The patient continues to feel well on auto BiPAP. He is 100% compliance with residual AHI of less than one. He remains alert. He received his Covid 19  vaccine. [Obstructive Sleep Apnea] : obstructive sleep apnea [Home] : home [BPAP:] : BPAP [TextBox_100] : 2/17 [TextBox_108] : 115 [TextBox_112] : 97 [TextBox_116] : 77 [TextBox_135] : auto  pap mac 20, epap min 8 ps5 [TextBox_127] : 9/20 [TextBox_129] : 10/20 [TextBox_133] : 100 [TextBox_137] : 100 [TextBox_141] : 7 [TextBox_147] : 2.0 [TextBox_143] : 34 [ESS] : 5

## 2021-06-08 ENCOUNTER — APPOINTMENT (OUTPATIENT)
Dept: ORTHOPEDIC SURGERY | Facility: CLINIC | Age: 66
End: 2021-06-08
Payer: MEDICARE

## 2021-06-08 DIAGNOSIS — M18.11 UNILATERAL PRIMARY OSTEOARTHRITIS OF FIRST CARPOMETACARPAL JOINT, RIGHT HAND: ICD-10-CM

## 2021-06-08 DIAGNOSIS — M79.642 PAIN IN RIGHT HAND: ICD-10-CM

## 2021-06-08 DIAGNOSIS — M18.12 UNILATERAL PRIMARY OSTEOARTHRITIS OF FIRST CARPOMETACARPAL JOINT, LEFT HAND: ICD-10-CM

## 2021-06-08 DIAGNOSIS — M79.641 PAIN IN RIGHT HAND: ICD-10-CM

## 2021-06-08 PROCEDURE — 99214 OFFICE O/P EST MOD 30 MIN: CPT | Mod: 25

## 2021-06-08 PROCEDURE — 20600 DRAIN/INJ JOINT/BURSA W/O US: CPT | Mod: F5

## 2021-06-08 PROCEDURE — 73130 X-RAY EXAM OF HAND: CPT | Mod: 50

## 2021-07-07 ENCOUNTER — APPOINTMENT (OUTPATIENT)
Dept: CARDIOLOGY | Facility: CLINIC | Age: 66
End: 2021-07-07
Payer: MEDICARE

## 2021-07-07 ENCOUNTER — NON-APPOINTMENT (OUTPATIENT)
Age: 66
End: 2021-07-07

## 2021-07-07 VITALS
DIASTOLIC BLOOD PRESSURE: 95 MMHG | HEIGHT: 66 IN | SYSTOLIC BLOOD PRESSURE: 157 MMHG | HEART RATE: 59 BPM | BODY MASS INDEX: 39.53 KG/M2 | WEIGHT: 246 LBS | RESPIRATION RATE: 16 BRPM

## 2021-07-07 VITALS — SYSTOLIC BLOOD PRESSURE: 130 MMHG | DIASTOLIC BLOOD PRESSURE: 76 MMHG

## 2021-07-07 PROCEDURE — 93000 ELECTROCARDIOGRAM COMPLETE: CPT

## 2021-07-07 PROCEDURE — 99214 OFFICE O/P EST MOD 30 MIN: CPT

## 2021-07-07 RX ORDER — VALSARTAN 160 MG/1
160 TABLET, COATED ORAL DAILY
Refills: 0 | Status: ACTIVE | COMMUNITY

## 2021-07-07 NOTE — DISCUSSION/SUMMARY
[FreeTextEntry1] : Patient is a 65yo M with mild non-obstructive CAD (coronary calcifications seen on CT), HTN,HLD, obesity MARY, mild carotid stenosis here for cardiac follow up.\par -Carotid with stable disease 1/2021, med management \par -Echo 1/2021 with preserved BiV function, aorta only borderline dilated. Mild AI, surveillance only\par -Nuclear stress testing 1/2021 without ischemia \par -ECG unchanged today, BP high but normal on recheck\par -NEeds better dietary habits and weight loss\par \par 1. Continue medical management of CAD/carotid stenosis\par 2. Continue current antihypertensives, blood pressure is well controlled \par 3. Recommend aggressive diet and lifestyle modifications, counselled on weight loss. \par 4. Recommend 30 minutes moderate intensity aerobic activity 5 days per week as tolerated by knees\par 5. CPAP for MARY, pulm follow up \par 6. Follow up 6 months\par 7. Regular PMD follow up and for routine BW \par

## 2021-07-07 NOTE — HISTORY OF PRESENT ILLNESS
[FreeTextEntry1] : Patient is a 65yo M with h/o mild CAD, HTN, COPD, MARY here for cardiac follow up. Patient has been doing well without any chest pain or shortness of breath. Patient denies PND/orthopnea/edema/palpitations/syncope/claudication.  Limited by knee pain still, states in need of replacement bilaterally ultimately. \par \par Works at [employer] part time. Daytime occasionally works as .  \par \par ROS: GI and  negative

## 2021-07-07 NOTE — ASSESSMENT
[FreeTextEntry1] : ECG: SB, Low voltage \par \par  HDL 60 LDL 56 TG 63 (11/2020)\par  HDL 64 LDL 59 TG 67 (8/2019) \par  HDL 69 LDL 46 TG 61 (6/2018) \par \par CAROTID 1/2021:\par 1. 1-49% ICA stenosis bilaterally\par 2. Antegrade flow in the vertebral arteries bilaterally\par \par ECHO 1/2021:\par 1. Normal LV size, systolic and diastolic function. EF 60-65%\par 2. Borderline LAE\par 3. Normal RV/RA\par 4. Mild AI\par 5. Borderline dilated aorta at 3.9cm\par \par PHARM NUCLEAR STRESS TEST 1/2021\par 1. Negative for ischemia/infarct, EF 62%\par 2. BP hypertensive at baseline, normal response \par \par CAROTID DUPLEX 11/2019:\par 1. There is 1-49% stenosis of the left proximal ICA.\par 2. There is 1-49% stenosis of the right proximal ICA.\par 3. There is antegrade flow in the right and left vertebral arteries.\par 4. No change compared to 2018 study\par \par ECHO 11/2019:\par 1. Technically difficult study due to body habitus.\par 2. Normal LV size, systolic and diastolic function. EF 60-65%\par 3. Normal RV/LA/RA \par 4. There is borderline dilatation of the ascending aorta. Asc Ao 3.9cm\par \par \par PHARM NUCLEAR STRESS TSET 5/2018:\par 1. Normal Sestamibi myocardial perfusion SPECT imaging with artifact as noted above.\par 2. Left ventricular function was normal with an EF of 67%.\par 3. The EKG was negative for ischemia.\par 4. The blood pressure response was hypertensive baseline with normal response to Regadenason.\par 5. The patient developed occasional PVCs \par 6. When compared to prior study dated 2017, there is no longer evidence of ischemia.\par

## 2021-10-18 ENCOUNTER — RX RENEWAL (OUTPATIENT)
Age: 66
End: 2021-10-18

## 2021-11-09 ENCOUNTER — RX RENEWAL (OUTPATIENT)
Age: 66
End: 2021-11-09

## 2022-01-14 ENCOUNTER — NON-APPOINTMENT (OUTPATIENT)
Age: 67
End: 2022-01-14

## 2022-01-14 ENCOUNTER — APPOINTMENT (OUTPATIENT)
Dept: CARDIOLOGY | Facility: CLINIC | Age: 67
End: 2022-01-14
Payer: MEDICARE

## 2022-01-14 VITALS
RESPIRATION RATE: 16 BRPM | HEIGHT: 66 IN | DIASTOLIC BLOOD PRESSURE: 84 MMHG | SYSTOLIC BLOOD PRESSURE: 148 MMHG | BODY MASS INDEX: 40.98 KG/M2 | OXYGEN SATURATION: 96 % | HEART RATE: 59 BPM | WEIGHT: 255 LBS

## 2022-01-14 VITALS — SYSTOLIC BLOOD PRESSURE: 128 MMHG | DIASTOLIC BLOOD PRESSURE: 78 MMHG

## 2022-01-14 PROCEDURE — 93000 ELECTROCARDIOGRAM COMPLETE: CPT

## 2022-01-14 PROCEDURE — 99214 OFFICE O/P EST MOD 30 MIN: CPT

## 2022-01-19 ENCOUNTER — APPOINTMENT (OUTPATIENT)
Dept: CARDIOLOGY | Facility: CLINIC | Age: 67
End: 2022-01-19
Payer: MEDICARE

## 2022-01-19 ENCOUNTER — NON-APPOINTMENT (OUTPATIENT)
Age: 67
End: 2022-01-19

## 2022-01-19 PROCEDURE — 93880 EXTRACRANIAL BILAT STUDY: CPT

## 2022-01-19 NOTE — ADDENDUM
[FreeTextEntry1] : 1/19/2022: Carotid reviewed, remains with mild bilateral stenosis. Continue med management and surveillance. Plan as above

## 2022-01-19 NOTE — HISTORY OF PRESENT ILLNESS
[FreeTextEntry1] : Patient is a 67yo M with h/o mild CAD, HTN, COPD, MARY here for cardiac follow up. Patient has been doing well without any chest pain or shortness of breath. Patient denies PND/orthopnea/edema/palpitations/syncope/claudication. Remains limited by knee pain and likely in need of replacement bilaterally\par \par Works at [employer] nights part time. Daytime occasionally works as .  \par \par ROS: GI and  negative

## 2022-01-19 NOTE — DISCUSSION/SUMMARY
[FreeTextEntry1] : Patient is a 65yo M with mild non-obstructive CAD (coronary calcifications seen on CT), HTN,HLD, obesity MARY, mild carotid stenosis here for cardiac follow up.\par -Carotid with stable disease 1/2021, med management \par -Echo 1/2021 with preserved BiV function, aorta only borderline dilated. Mild AI, surveillance only\par -Nuclear stress testing 1/2021 without ischemia \par -ECG unchanged today, BP high but normal on recheck\par -NEeds better dietary habits and weight loss still . Re-enforced need to stop eating fast food \par \par 1. Continue medical management of CAD/carotid stenosis\par 2. Continue current antihypertensives, blood pressure is well controlled \par 3. Recommend aggressive diet and lifestyle modifications, counselled on weight loss. \par 4. Recommend 30 minutes moderate intensity aerobic activity 5 days per week as tolerated by knees\par 5. CPAP for MARY, pulm follow up \par 6. Follow up 6 months\par 7. Renal follow up for hyponatremia\par 8. Repeat carotid  for surveillance and all with results \par \par

## 2022-01-19 NOTE — ASSESSMENT
[FreeTextEntry1] : ECG: SR, no ST-T abnormalities \par \par  HDL 59 LDL 61 TG 79 (9/2021) \par  HDL 60 LDL 56 TG 63 (11/2020)\par  HDL 64 LDL 59 TG 67 (8/2019) \par  HDL 69 LDL 46 TG 61 (6/2018) \par \par CAROTID 1/2021:\par 1. 1-49% ICA stenosis bilaterally\par 2. Antegrade flow in the vertebral arteries bilaterally\par \par ECHO 1/2021:\par 1. Normal LV size, systolic and diastolic function. EF 60-65%\par 2. Borderline LAE\par 3. Normal RV/RA\par 4. Mild AI\par 5. Borderline dilated aorta at 3.9cm\par \par PHARM NUCLEAR STRESS TEST 1/2021\par 1. Negative for ischemia/infarct, EF 62%\par 2. BP hypertensive at baseline, normal response \par \par CAROTID DUPLEX 11/2019:\par 1. There is 1-49% stenosis of the left proximal ICA.\par 2. There is 1-49% stenosis of the right proximal ICA.\par 3. There is antegrade flow in the right and left vertebral arteries.\par 4. No change compared to 2018 study\par \par ECHO 11/2019:\par 1. Technically difficult study due to body habitus.\par 2. Normal LV size, systolic and diastolic function. EF 60-65%\par 3. Normal RV/LA/RA \par 4. There is borderline dilatation of the ascending aorta. Asc Ao 3.9cm\par \par \par PHARM NUCLEAR STRESS TSET 5/2018:\par 1. Normal Sestamibi myocardial perfusion SPECT imaging with artifact as noted above.\par 2. Left ventricular function was normal with an EF of 67%.\par 3. The EKG was negative for ischemia.\par 4. The blood pressure response was hypertensive baseline with normal response to Regadenason.\par 5. The patient developed occasional PVCs \par 6. When compared to prior study dated 2017, there is no longer evidence of ischemia.\par

## 2022-03-31 ENCOUNTER — APPOINTMENT (OUTPATIENT)
Dept: ORTHOPEDIC SURGERY | Facility: CLINIC | Age: 67
End: 2022-03-31
Payer: MEDICARE

## 2022-03-31 VITALS
WEIGHT: 245 LBS | HEART RATE: 56 BPM | HEIGHT: 66 IN | SYSTOLIC BLOOD PRESSURE: 170 MMHG | DIASTOLIC BLOOD PRESSURE: 99 MMHG | BODY MASS INDEX: 39.37 KG/M2

## 2022-03-31 DIAGNOSIS — M17.11 UNILATERAL PRIMARY OSTEOARTHRITIS, RIGHT KNEE: ICD-10-CM

## 2022-03-31 PROCEDURE — 99215 OFFICE O/P EST HI 40 MIN: CPT

## 2022-03-31 PROCEDURE — 73564 X-RAY EXAM KNEE 4 OR MORE: CPT | Mod: 50

## 2022-03-31 NOTE — ADDENDUM
[FreeTextEntry1] : This note was authored by Clayton Cox working as a medical scribe for Dr. Daniel Joyce. The note was reviewed, edited, and revised by Dr. Daniel Joyce whom is in agreement with the exam findings, imaging findings, and treatment plan. 03/31/2022

## 2022-03-31 NOTE — HISTORY OF PRESENT ILLNESS
[de-identified] : Patient is a 66-year-old male presenting for evaluation of worsening bilateral knee pain, right worse than left.  He notes the pain is localized laterally and anteriorly.  The pain is worse with all weightbearing to and including walking distance and using the stairs.  He admits to intermittent buckling and locking of the knees.  In the past has been diagnosed with osteoarthritis and treated conservatively thus far.  His last injections in the knee gel shots in 2017.  He reports little improvement at that time.  He is also tried physical therapy without significant improvement.  He takes anti-inflammatories and Tylenol without relief.\par Past medical history with COPD and sleep apnea.

## 2022-03-31 NOTE — PHYSICAL EXAM
[de-identified] : The patient appears well nourished  and in no apparent distress.  The patient is alert and oriented to person, place, and time.   Affect and mood appear normal. The head is normocephalic and atraumatic.  The eyes reveal normal sclera and extra ocular muscles are intact. The tongue is midline with no apparent lesions.  Skin shows normal turgor with no evidence of eczema or psoriasis.  No respiratory distress noted.  Sensation grossly intact.		  [de-identified] : Exam of the right knee shows 3 mm laxity of the MCL, 0 to 122 degrees of flexion measured with a goniometer. \par Exam of the left knee shows intact MCL, 0 to 119 degrees of flexion measured with a goniometer. \par 5/5 motor strength bilaterally distally. Sensation intact distally.  [de-identified] : X-ray: 4 views of the left knee demonstrate bone on bone valgus arthritis.		 		\par X-ray: 4 views of the right knee demonstrate bone on bone valgus arthritis.

## 2022-03-31 NOTE — DISCUSSION/SUMMARY
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 Based upon the patients continued symptoms and failure to respond to conservative treatment I have recommended a right total knee replacement for the patient.  A discussion was had with the patient regarding a right total knee replacement. A long discussion was had with the patient as what the total joint replacement would entail. A model was used to demonstrate the operation and to discuss bearing surfaces of the implants. The hospitalization and rehabilitation were discussed.  The use of perioperative antibiotics and DVT prophylaxis were discussed. The risks, benefits and alternatives to surgical intervention were discussed at length with the patient. Specific risks discussed included: infection, wound breakdown, numbness and damage to nerves, tendon, muscle, arteries or other blood vessels. The possibility of recurrent pain, no improvement in pain and actual worsening of the pain were also mentioned in conversation with the patient. Medical complications related to the patient's general medical health including deep vein thrombosis, pulmonary embolus, heart attack, stroke, death and other complications from anesthesia were discussed as well. The patient was told that we will take steps to minimize these risks by using sterile technique, antibiotics and DVT prophylaxis when appropriate and following the patient postoperatively in the clinic setting to monitor progress. The benefits of surgery were discussed with the patient including the potential to improve the current clinical condition through operative intervention. Alternatives to surgical intervention include continued conservative management which may yield less than optimal results in this particular patient. All questions were answered to the satisfaction of the patient. Models were used as an educational tool. We did discuss implant choices and fixation, with shared decision making with the patient.	\par \par I had a discussion with the patient regarding the possibility of peroneal nerve palsy with valgus deformity correction.  We discussed that this is a very rare complication of correction of the deformity.  We discussed that if it does occur it may not be reversible and could lead to permanent foot drop which may require the use of an AFO.  The patient understands this fully.  Despite this risk the patient would like to proceed with surgery.  We will do everything reasonable to prevent this from occurring.

## 2022-04-01 ENCOUNTER — NON-APPOINTMENT (OUTPATIENT)
Age: 67
End: 2022-04-01

## 2022-04-01 ENCOUNTER — APPOINTMENT (OUTPATIENT)
Dept: CARDIOLOGY | Facility: CLINIC | Age: 67
End: 2022-04-01
Payer: MEDICARE

## 2022-04-01 VITALS
RESPIRATION RATE: 16 BRPM | DIASTOLIC BLOOD PRESSURE: 86 MMHG | BODY MASS INDEX: 41.14 KG/M2 | HEIGHT: 66 IN | OXYGEN SATURATION: 97 % | SYSTOLIC BLOOD PRESSURE: 134 MMHG | HEART RATE: 64 BPM | WEIGHT: 256 LBS

## 2022-04-01 DIAGNOSIS — Z01.810 ENCOUNTER FOR PREPROCEDURAL CARDIOVASCULAR EXAMINATION: ICD-10-CM

## 2022-04-01 PROCEDURE — 99214 OFFICE O/P EST MOD 30 MIN: CPT

## 2022-04-01 PROCEDURE — 93000 ELECTROCARDIOGRAM COMPLETE: CPT | Mod: NC

## 2022-04-12 ENCOUNTER — APPOINTMENT (OUTPATIENT)
Dept: PULMONOLOGY | Facility: CLINIC | Age: 67
End: 2022-04-12
Payer: MEDICARE

## 2022-04-12 VITALS — WEIGHT: 250 LBS | BODY MASS INDEX: 41.65 KG/M2 | HEIGHT: 65 IN

## 2022-04-12 VITALS — HEIGHT: 65 IN | WEIGHT: 250 LBS | BODY MASS INDEX: 41.65 KG/M2

## 2022-04-12 VITALS
SYSTOLIC BLOOD PRESSURE: 132 MMHG | RESPIRATION RATE: 16 BRPM | DIASTOLIC BLOOD PRESSURE: 86 MMHG | HEART RATE: 56 BPM | OXYGEN SATURATION: 95 %

## 2022-04-12 DIAGNOSIS — J44.9 CHRONIC OBSTRUCTIVE PULMONARY DISEASE, UNSPECIFIED: ICD-10-CM

## 2022-04-12 PROCEDURE — 94729 DIFFUSING CAPACITY: CPT

## 2022-04-12 PROCEDURE — 99215 OFFICE O/P EST HI 40 MIN: CPT | Mod: 25

## 2022-04-12 PROCEDURE — 85018 HEMOGLOBIN: CPT | Mod: QW

## 2022-04-12 PROCEDURE — 94010 BREATHING CAPACITY TEST: CPT

## 2022-04-12 PROCEDURE — 94727 GAS DIL/WSHOT DETER LNG VOL: CPT

## 2022-04-12 NOTE — ASSESSMENT
[FreeTextEntry1] : Patient is cleared for the proposed surgery from a pulmonary and sleep medicine point of view. He should bring his auto BiPAP to the hospital for postoperative use and should be available in the recovery room as he awakens and anesthesia. Difficult airway precautions should be taken.\par \par \par Masks and BiPAP supplies have been ordered for him. Follow up will be in one year. At that point he will need a new machine.

## 2022-04-12 NOTE — REASON FOR VISIT
[Follow-Up] : a follow-up visit [Sleep Apnea] : sleep apnea [Pre-op Risk Stratification] : pre-op risk stratification [Spouse] : spouse

## 2022-04-12 NOTE — CONSULT LETTER
[Dear  ___] : Dear  [unfilled], [Courtesy Letter:] : I had the pleasure of seeing your patient, [unfilled], in my office today. [Please see my note below.] : Please see my note below. [Consult Closing:] : Thank you very much for allowing me to participate in the care of this patient.  If you have any questions, please do not hesitate to contact me. [Sincerely,] : Sincerely, [FreeTextEntry3] : Madalyn Daugherty MD FCCP\par D-ABSM\par ABIM board certified in  Pulmonary diseases, Sleep medicine\par Internal medicine\par  [DrKarlee  ___] : Dr. RESTREPO

## 2022-04-12 NOTE — HISTORY OF PRESENT ILLNESS
[TextBox_4] : Patient is preoperative for right knee replacement on May 4. He has a history of severe obstructive sleep apnea with a history of hypercapnic respiratory failure probably from obesity hypoventilation. Mild obstructive parameters have been noted in the past but his lung function has largely been in the normal range. Compliance with auto BiPAP is excellent. The patient denies shortness of breath cough or wheeze. [Obstructive Sleep Apnea] : obstructive sleep apnea [Home] : home [BPAP:] : BPAP [TextBox_100] : 2/17 [TextBox_108] : 115 [TextBox_112] : 97 [TextBox_116] : 77 [TextBox_135] : auto  pap mac 20, epap min 8 ps5 [TextBox_127] : 3/22 [TextBox_129] : 4/22 [TextBox_133] : 100 [TextBox_137] : 100 [TextBox_141] : 7 [TextBox_143] : 51 [TextBox_147] : 4.8 [TextBox_165] : alert on BiPAP.  Median pressure 15/10

## 2022-04-12 NOTE — PROCEDURE
[FreeTextEntry1] : Pulmonary function studies are in the normal range. Obesity related volume changes are noted. Diffusing capacity is normal. Hemoglobin is 12.2.

## 2022-04-19 ENCOUNTER — OUTPATIENT (OUTPATIENT)
Dept: OUTPATIENT SERVICES | Facility: HOSPITAL | Age: 67
LOS: 1 days | End: 2022-04-19
Payer: MEDICARE

## 2022-04-19 VITALS
HEIGHT: 65 IN | HEART RATE: 88 BPM | SYSTOLIC BLOOD PRESSURE: 144 MMHG | DIASTOLIC BLOOD PRESSURE: 88 MMHG | WEIGHT: 249.56 LBS | TEMPERATURE: 98 F | OXYGEN SATURATION: 97 % | RESPIRATION RATE: 18 BRPM

## 2022-04-19 DIAGNOSIS — I10 ESSENTIAL (PRIMARY) HYPERTENSION: ICD-10-CM

## 2022-04-19 DIAGNOSIS — Z98.890 OTHER SPECIFIED POSTPROCEDURAL STATES: Chronic | ICD-10-CM

## 2022-04-19 DIAGNOSIS — G47.33 OBSTRUCTIVE SLEEP APNEA (ADULT) (PEDIATRIC): ICD-10-CM

## 2022-04-19 DIAGNOSIS — E11.9 TYPE 2 DIABETES MELLITUS WITHOUT COMPLICATIONS: ICD-10-CM

## 2022-04-19 DIAGNOSIS — Z92.29 PERSONAL HISTORY OF OTHER DRUG THERAPY: ICD-10-CM

## 2022-04-19 DIAGNOSIS — Z01.818 ENCOUNTER FOR OTHER PREPROCEDURAL EXAMINATION: ICD-10-CM

## 2022-04-19 DIAGNOSIS — Z29.9 ENCOUNTER FOR PROPHYLACTIC MEASURES, UNSPECIFIED: ICD-10-CM

## 2022-04-19 DIAGNOSIS — M17.11 UNILATERAL PRIMARY OSTEOARTHRITIS, RIGHT KNEE: ICD-10-CM

## 2022-04-19 DIAGNOSIS — Z13.89 ENCOUNTER FOR SCREENING FOR OTHER DISORDER: ICD-10-CM

## 2022-04-19 LAB
A1C WITH ESTIMATED AVERAGE GLUCOSE RESULT: 5.8 % — HIGH (ref 4–5.6)
ALBUMIN SERPL ELPH-MCNC: 4.3 G/DL — SIGNIFICANT CHANGE UP (ref 3.3–5.2)
ALP SERPL-CCNC: 109 U/L — SIGNIFICANT CHANGE UP (ref 40–120)
ALT FLD-CCNC: 29 U/L — SIGNIFICANT CHANGE UP
ANION GAP SERPL CALC-SCNC: 13 MMOL/L — SIGNIFICANT CHANGE UP (ref 5–17)
APTT BLD: 32.8 SEC — SIGNIFICANT CHANGE UP (ref 27.5–35.5)
AST SERPL-CCNC: 30 U/L — SIGNIFICANT CHANGE UP
BASOPHILS # BLD AUTO: 0.04 K/UL — SIGNIFICANT CHANGE UP (ref 0–0.2)
BASOPHILS NFR BLD AUTO: 0.6 % — SIGNIFICANT CHANGE UP (ref 0–2)
BILIRUB SERPL-MCNC: 0.6 MG/DL — SIGNIFICANT CHANGE UP (ref 0.4–2)
BLD GP AB SCN SERPL QL: SIGNIFICANT CHANGE UP
BUN SERPL-MCNC: 15.9 MG/DL — SIGNIFICANT CHANGE UP (ref 8–20)
CALCIUM SERPL-MCNC: 9 MG/DL — SIGNIFICANT CHANGE UP (ref 8.6–10.2)
CHLORIDE SERPL-SCNC: 98 MMOL/L — SIGNIFICANT CHANGE UP (ref 98–107)
CO2 SERPL-SCNC: 25 MMOL/L — SIGNIFICANT CHANGE UP (ref 22–29)
CREAT SERPL-MCNC: 1.04 MG/DL — SIGNIFICANT CHANGE UP (ref 0.5–1.3)
EGFR: 78 ML/MIN/1.73M2 — SIGNIFICANT CHANGE UP
EOSINOPHIL # BLD AUTO: 0.15 K/UL — SIGNIFICANT CHANGE UP (ref 0–0.5)
EOSINOPHIL NFR BLD AUTO: 2.4 % — SIGNIFICANT CHANGE UP (ref 0–6)
ESTIMATED AVERAGE GLUCOSE: 120 MG/DL — HIGH (ref 68–114)
GLUCOSE SERPL-MCNC: 96 MG/DL — SIGNIFICANT CHANGE UP (ref 70–99)
HCT VFR BLD CALC: 44.6 % — SIGNIFICANT CHANGE UP (ref 39–50)
HGB BLD-MCNC: 14.6 G/DL — SIGNIFICANT CHANGE UP (ref 13–17)
IMM GRANULOCYTES NFR BLD AUTO: 0.5 % — SIGNIFICANT CHANGE UP (ref 0–1.5)
INR BLD: 1.03 RATIO — SIGNIFICANT CHANGE UP (ref 0.88–1.16)
LYMPHOCYTES # BLD AUTO: 1.36 K/UL — SIGNIFICANT CHANGE UP (ref 1–3.3)
LYMPHOCYTES # BLD AUTO: 21.4 % — SIGNIFICANT CHANGE UP (ref 13–44)
MCHC RBC-ENTMCNC: 28.6 PG — SIGNIFICANT CHANGE UP (ref 27–34)
MCHC RBC-ENTMCNC: 32.7 GM/DL — SIGNIFICANT CHANGE UP (ref 32–36)
MCV RBC AUTO: 87.5 FL — SIGNIFICANT CHANGE UP (ref 80–100)
MONOCYTES # BLD AUTO: 0.66 K/UL — SIGNIFICANT CHANGE UP (ref 0–0.9)
MONOCYTES NFR BLD AUTO: 10.4 % — SIGNIFICANT CHANGE UP (ref 2–14)
MRSA PCR RESULT.: SIGNIFICANT CHANGE UP
NEUTROPHILS # BLD AUTO: 4.12 K/UL — SIGNIFICANT CHANGE UP (ref 1.8–7.4)
NEUTROPHILS NFR BLD AUTO: 64.7 % — SIGNIFICANT CHANGE UP (ref 43–77)
PLATELET # BLD AUTO: 258 K/UL — SIGNIFICANT CHANGE UP (ref 150–400)
POTASSIUM SERPL-MCNC: 4.8 MMOL/L — SIGNIFICANT CHANGE UP (ref 3.5–5.3)
POTASSIUM SERPL-SCNC: 4.8 MMOL/L — SIGNIFICANT CHANGE UP (ref 3.5–5.3)
PROT SERPL-MCNC: 7.2 G/DL — SIGNIFICANT CHANGE UP (ref 6.6–8.7)
PROTHROM AB SERPL-ACNC: 11.9 SEC — SIGNIFICANT CHANGE UP (ref 10.5–13.4)
RBC # BLD: 5.1 M/UL — SIGNIFICANT CHANGE UP (ref 4.2–5.8)
RBC # FLD: 12.9 % — SIGNIFICANT CHANGE UP (ref 10.3–14.5)
S AUREUS DNA NOSE QL NAA+PROBE: SIGNIFICANT CHANGE UP
SODIUM SERPL-SCNC: 136 MMOL/L — SIGNIFICANT CHANGE UP (ref 135–145)
WBC # BLD: 6.36 K/UL — SIGNIFICANT CHANGE UP (ref 3.8–10.5)
WBC # FLD AUTO: 6.36 K/UL — SIGNIFICANT CHANGE UP (ref 3.8–10.5)

## 2022-04-19 PROCEDURE — 93010 ELECTROCARDIOGRAM REPORT: CPT

## 2022-04-19 PROCEDURE — G0463: CPT

## 2022-04-19 PROCEDURE — 93005 ELECTROCARDIOGRAM TRACING: CPT

## 2022-04-19 RX ORDER — DOXAZOSIN MESYLATE 4 MG
1 TABLET ORAL
Qty: 0 | Refills: 0 | DISCHARGE

## 2022-04-19 NOTE — H&P PST ADULT - PROBLEM SELECTOR PLAN 2
CPAP in use.  Patient instructed to bring CPAP machine day of surgery, verbalized understanding.  Pulmonary clearance pending.

## 2022-04-19 NOTE — H&P PST ADULT - ASSESSMENT
This is a pleasant morbidly obese 66 year old  male in NAD   with history of COPD, Sleep apnea on CPAP, HTN, HLD, osteoarthritis and anxiety presents today for PST c/o right knee pain. Patient with longstanding history of right knee pain and osteoarthritis. Pain is localized laterally and anteriorly in the right knee.  X-ray 4 views of the right knee performed in Dr. Joyce's office on 3/31/22 demonstrate bone on bone valgus arthritis. Now scheduled for complex right total knee replacement on 22  with Dr. Joyce pending medical, cardiac and pulmonary clearance.     CAPRINI SCORE    AGE RELATED RISK FACTORS                                                             [ ] Age 41-60 years                                            (1 Point)  [X ] Age: 61-74 years                                           (2 Points)                 [ ] Age= 75 years                                                (3 Points)             DISEASE RELATED RISK FACTORS                                                       [X ] Edema in the lower extremities                 (1 Point)                     [ ] Varicose veins                                               (1 Point)                                 [X ] BMI > 25 Kg/m2                                            (1 Point)                                  [ ] Serious infection (ie PNA)                            (1 Point)                     [X ] Lung disease ( COPD, Emphysema)            (1 Point)                                                                          [ ] Acute myocardial infarction                         (1 Point)                  [ ] Congestive heart failure (in the previous month)  (1 Point)         [ ] Inflammatory bowel disease                            (1 Point)                  [ ] Central venous access, PICC or Port               (2 points)       (within the last month)                                                                [ ] Stroke (in the previous month)                        (5 Points)    [ ] Previous or present malignancy                       (2 points)                                                                                                                                                         HEMATOLOGY RELATED FACTORS                                                         [ ] Prior episodes of VTE                                     (3 Points)                     [ ] Positive family history for VTE                      (3 Points)                  [ ] Prothrombin 32843 A                                     (3 Points)                     [ ] Factor V Leiden                                                (3 Points)                        [ ] Lupus anticoagulants                                      (3 Points)                                                           [ ] Anticardiolipin antibodies                              (3 Points)                                                       [ ] High homocysteine in the blood                   (3 Points)                                             [ ] Other congenital or acquired thrombophilia      (3 Points)                                                [ ] Heparin induced thrombocytopenia                  (3 Points)                                        MOBILITY RELATED FACTORS  [ ] Bed rest                                                         (1 Point)  [ ] Plaster cast                                                    (2 points)  [ ] Bed bound for more than 72 hours           (2 Points)    GENDER SPECIFIC FACTORS  [ ] Pregnancy or had a baby within the last month   (1 Point)  [ ] Post-partum < 6 weeks                                   (1 Point)  [ ] Hormonal therapy  or oral contraception   (1 Point)  [ ] History of pregnancy complications              (1 point)  [ ] Unexplained or recurrent              (1 Point)    OTHER RISK FACTORS                                           (1 Point)  [X ] BMI >40, smoking, diabetes requiring insulin, chemotherapy  blood transfusions and length of surgery over 2 hours    SURGERY RELATED RISK FACTORS  [ ]  Section within the last month     (1 Point)  [ ] Minor surgery                                                  (1 Point)  [ ] Arthroscopic surgery                                       (2 Points)  [ ] Planned major surgery lasting more            (2 Points)      than 45 minutes     [X ] Elective hip or knee joint replacement       (5 points)       surgery                                                TRAUMA RELATED RISK FACTORS  [ ] Fracture of the hip, pelvis, or leg                       (5 Points)  [ ] Spinal cord injury resulting in paralysis             (5 points)       (in the previous month)    [ ] Paralysis  (less than 1 month)                             (5 Points)  [ ] Multiple Trauma within 1 month                        (5 Points)    Total Score [    11    ]    Caprini Score 0-2: Low Risk, NO VTE prophylaxis required for most patients, encourage ambulation  Caprini Score 3-6: Moderate Risk , pharmacologic VTE prophylaxis is indicated for most patients (in the absence of contraindications)  Caprini Score Greater than or =7: High risk, pharmocologic VTE prophylaxis indicated for most patients (in the absence of contraindications)    OPIOID RISK TOOL    SHANELLE EACH BOX THAT APPLIES AND ADD TOTALS AT THE END    FAMILY HISTORY OF SUBSTANCE ABUSE                 FEMALE         MALE                                                Alcohol                             [  ]1 pt          [  ]3pts                                               Illegal Durgs                     [  ]2 pts        [  ]3pts                                               Rx Drugs                           [  ]4 pts        [  ]4 pts    PERSONAL HISTORY OF SUBSTANCE ABUSE                                                                                          Alcohol                             [  ]3 pts       [  ]3 pts                                               Illegal Drugs                     [  ]4 pts        [  ]4 pts                                               Rx Drugs                           [  ]5 pts        [  ]5 pts    AGE BETWEEN 16-45 YEARS                                      [  ]1 pt         [  ]1 pt    HISTORY OF PREADOLESCENT   SEXUAL ABUSE                                                             [  ]3 pts        [  ]0pts    PSYCHOLOGICAL DISEASE                     ADD, OCD, Bipolar, Schizophrenia        [  ]2 pts         [  ]2 pts                      Depression                                               [  ]1 pt           [  ]1 pt           SCORING TOTAL   (add numbers and type here)              (*0**)                                     A score of 3 or lower indicated LOW risk for future opioid abuse  A score of 4 to 7 indicated moderate risk for future opioid abuse  A score of 8 or higher indicates a high risk for opioid abuse

## 2022-04-19 NOTE — H&P PST ADULT - REASON FOR ADMISSION
Post-Care Instructions: Reviewed with patient post-care instructions. Patient  to keep the biopsy site dry overnight, and then apply Vaseline 1-2x daily until healed. "right knee replacement"

## 2022-04-19 NOTE — H&P PST ADULT - PROBLEM SELECTOR PLAN 5
ORT score 0 patient low risk BP today 144/78. Continue medications as instructed.  EKG performed.  Patient instructed to take metoprolol with a sip of water day of surgery and hold Valsartan (ARB), verbalized understanding.  Cardiac clearance pending

## 2022-04-19 NOTE — H&P PST ADULT - PROBLEM SELECTOR PLAN 1
Labs, EKG and MRSA/MSSA performed.  Written and verbal instructions provided.  Scheduled for complex right total knee replacement on 5/4/22  with Dr. Joyce pending medical, cardiac and pulmonary clearance.   Patient to have medical clearance with Dr. Mcknight  Patient to have cardiac clearance with Dr. Diane  Patient  to have pulmonary clearance with Dr. Cain  Patient educated on surgical scrub, COVID testing 5/1, preadmission instructions, medical, cardiac & pulmonary clearance and day of procedure medications, verbalizes understanding, teach back method utilized.  Do not stop Aspirin  without speaking with cardiologist first.  Patient was given information on joint class, joint book provided, ERP protocol reviewed with patient, MSSA/MRSA swabbed in PST, results pending Labs, EKG and MRSA/MSSA performed.  Written and verbal instructions provided.  Scheduled for complex right total knee replacement on 5/4/22  with Dr. Joyce pending medical, cardiac and pulmonary clearance.   Patient to have medical clearance with Dr. Mcknight  Patient to have cardiac clearance with Dr. Diane  Patient  to have pulmonary clearance with Dr. Cain  Patient educated on surgical scrub, COVID testing 5/1, preadmission instructions, liquids before surgery,  medical, cardiac & pulmonary clearance and day of procedure medications, verbalizes understanding, teach back method utilized.  Do not stop Aspirin  without speaking with cardiologist first.  Patient was given information on joint class, joint book provided, ERP protocol reviewed with patient, MSSA/MRSA swabbed in PST, results pending

## 2022-04-19 NOTE — H&P PST ADULT - NEGATIVE ENMT SYMPTOMS
no hearing difficulty/no ear pain/no tinnitus/no nasal congestion/no post-nasal discharge/no gum bleeding/no dry mouth/no throat pain/no dysphagia

## 2022-04-19 NOTE — H&P PST ADULT - HISTORY OF PRESENT ILLNESS
66 year old  male  with history of COPD, Sleep apnea on CPAP, HTN, HLD, osteoarthritis and anxiety presents today for PST c/o right knee pain. Patient reports longstanding history of right knee pain for about 8 that has progressively worsened over the last 6 months. Describes pain as intermittent, burning, achy and throbbing. The pain is localized laterally and anteriorly in the right knee. Current pain level of 4/10 and maximum pain level of 9/10. Pain is aggravated by all weightbearing activities including walking, standing, stairs, bending and standing from a seated position. Pain is minimally relieved by rest, elevation and Icy hot and  flurbiprofen.  He admits to intermittent buckling and locking of the right knee. He has tried to treat conservatively thus far including, physical therapy, cortisone injections and gel shots with his last injection in 2017. He reports little improvement at that time 66 year old  male  with history of COPD, Sleep apnea on CPAP, HTN, HLD, osteoarthritis and anxiety presents today for PST c/o right knee pain. Patient reports longstanding history of right knee pain for about 8 that has progressively worsened over the last 6 months. Describes pain as intermittent, burning, achy and throbbing. The pain is localized laterally and anteriorly in the right knee. Current pain level of 4/10 and maximum pain level of 9/10. Pain is aggravated by all weightbearing activities including walking, standing, stairs, bending and standing from a seated position. Pain is minimally relieved by rest, elevation and Icy hot and  flurbiprofen.  He admits to intermittent buckling and locking of the right knee. He has tried  conservative treatments with little improvement including, physical therapy, cortisone injections and gel shots with his last injection in 2017. He reports little improvement at that time. X-ray 4 views of the right knee performed in Dr. Joyce's office on 3/31/22 demonstrate bone on bone valgus arthritis. Now scheduled for complex right total knee replacement on 5/4/22  with Dr. Joyce pending medical, cardiac and pulmonary clearance.  66 year old  male  with history of COPD, Sleep apnea on CPAP, HTN, HLD, diabetes, osteoarthritis and anxiety presents today for PST c/o right knee pain. Patient reports longstanding history of right knee pain for about 8 that has progressively worsened over the last 6 months. Describes pain as intermittent, burning, achy and throbbing. The pain is localized laterally and anteriorly in the right knee. Current pain level of 4/10 and maximum pain level of 9/10. Pain is aggravated by all weightbearing activities including walking, standing, stairs, bending and standing from a seated position. Pain is minimally relieved by rest, elevation and Icy hot and  flurbiprofen.  He admits to intermittent buckling and locking of the right knee. He has tried  conservative treatments with little improvement including, physical therapy, cortisone injections and gel shots with his last injection in 2017. He reports little improvement at that time. X-ray 4 views of the right knee performed in Dr. Joyce's office on 3/31/22 demonstrate bone on bone valgus arthritis. Now scheduled for complex right total knee replacement on 5/4/22  with Dr. Joyce pending medical, cardiac and pulmonary clearance.  66 year old  male  with history of COPD, Sleep apnea on CPAP, HTN, HLD,  osteoarthritis and anxiety presents today for PST c/o right knee pain. Patient reports longstanding history of right knee pain for about 8 that has progressively worsened over the last 6 months. Describes pain as intermittent, burning, achy and throbbing. The pain is localized laterally and anteriorly in the right knee. Current pain level of 4/10 and maximum pain level of 9/10. Pain is aggravated by all weightbearing activities including walking, standing, stairs, bending and standing from a seated position. Pain is minimally relieved by rest, elevation and Icy hot and  flurbiprofen.  He admits to intermittent buckling and locking of the right knee. He has tried  conservative treatments with little improvement including, physical therapy, cortisone injections and gel shots with his last injection in 2017. He reports little improvement at that time. X-ray 4 views of the right knee performed in Dr. Joyce's office on 3/31/22 demonstrate bone on bone valgus arthritis. Now scheduled for complex right total knee replacement on 5/4/22  with Dr. Joyce pending medical, cardiac and pulmonary clearance.

## 2022-04-19 NOTE — H&P PST ADULT - NEGATIVE NEUROLOGICAL SYMPTOMS
no transient paralysis/no weakness/no paresthesias/no generalized seizures/no focal seizures/no syncope/no headache

## 2022-04-19 NOTE — H&P PST ADULT - PROBLEM SELECTOR PLAN 4
BP today 144/78. Continue medications as instructed.  EKG performed.  Patient instructed to take metoprolol with a sip of water day of surgery and hold Valsartan (ARB), verbalized understanding.  Cardiac clearance pending CAP score 11 patient High risk, SCDs ordered for day of procedure.  Surgical team to assess need for  pharmacological and mechanical measures for VTE prophylaxis

## 2022-04-19 NOTE — H&P PST ADULT - MUSCULOSKELETAL COMMENTS
bilateral knee pain, right knee edema, buckling of right knee and clicking, bilateral shoulder pain valgus deformity,

## 2022-04-19 NOTE — H&P PST ADULT - PROBLEM SELECTOR PLAN 3
CAP score 11 patient High risk, SCDs ordered for day of procedure.  Surgical team to assess need for  pharmacological and mechanical measures for VTE prophylaxis A1c level performed.  Finger stick on day of procedure.  Asked the patient not to take diabetes meds on the day of procedure.  Verbalized understanding.  Medical clearance pending

## 2022-04-19 NOTE — H&P PST ADULT - NSICDXPASTMEDICALHX_GEN_ALL_CORE_FT
PAST MEDICAL HISTORY:  Anxiety     Chronic obstructive pulmonary disease (COPD)     HTN (hypertension)     Hyperlipidemia     Obese     Obstructive sleep apnea on CPAP      PAST MEDICAL HISTORY:  Anxiety     Chronic obstructive pulmonary disease (COPD)     Diabetes     HTN (hypertension)     Hyperlipidemia     Obese     Obstructive sleep apnea on CPAP

## 2022-04-19 NOTE — H&P PST ADULT - NSICDXFAMILYHX_GEN_ALL_CORE_FT
FAMILY HISTORY:  FH: COPD (chronic obstructive pulmonary disease), father  FH: diabetes mellitus, sister

## 2022-04-21 NOTE — DISCUSSION/SUMMARY
[FreeTextEntry1] : Patient is a 65yo M with mild non-obstructive CAD (coronary calcifications seen on CT), HTN,HLD, obesity MARY, mild carotid stenosis here for cardiac follow up.\par -Carotid with stable disease 1/2021, med management \par -Echo 1/2021 with preserved BiV function, aorta only borderline dilated. Mild AI, surveillance only\par -Nuclear stress testing 1/2021 without ischemia \par -ECG unchanged today, BP high but normal on recheck\par -NEeds better dietary habits and weight loss still . Re-enforced need to stop eating fast food, has continued to gain weight.  \par \par 1. Continue medical management of CAD/carotid stenosis\par 2. Continue current antihypertensives, blood pressure is well controlled \par 3. Recommend aggressive diet and lifestyle modifications, counselled on weight loss. \par 4. Recommend 30 minutes moderate intensity aerobic activity 5 days per week as tolerated by knees\par 5. CPAP for MARY, pulm follow up \par 6. Follow up 6 months\par 7. Renal follow up for hyponatremia, being monitored and only borderline low\par 8. Patient is at low cardiovascular risk for low risk knee surgery , no further cardiac work up at this time\par

## 2022-04-21 NOTE — HISTORY OF PRESENT ILLNESS
[FreeTextEntry1] : Patient is a 65yo M with h/o mild CAD, HTN, COPD, MARY here for cardiac follow up and pre-operative evaluation. Patient has been doing well without any chest pain or shortness of breath. Patient denies PND/orthopnea/edema/palpitations/syncope/claudication. Remains limited by knee pain and now planned for replacement. Continues to gain weight. \par \par Works at [employer] part time. Daytime occasionally works as .  \par \par ROS: GI and  negative

## 2022-04-21 NOTE — ASSESSMENT
[FreeTextEntry1] : ECG: SR, no ST-T abnormalities , leftward axis\par \par  HDL 59 LDL 61 TG 79 (9/2021) \par  HDL 60 LDL 56 TG 63 (11/2020)\par  HDL 64 LDL 59 TG 67 (8/2019) \par  HDL 69 LDL 46 TG 61 (6/2018) \par \par CAROTID 1/2021:\par 1. 1-49% ICA stenosis bilaterally\par 2. Antegrade flow in the vertebral arteries bilaterally\par \par ECHO 1/2021:\par 1. Normal LV size, systolic and diastolic function. EF 60-65%\par 2. Borderline LAE\par 3. Normal RV/RA\par 4. Mild AI\par 5. Borderline dilated aorta at 3.9cm\par \par PHARM NUCLEAR STRESS TEST 1/2021\par 1. Negative for ischemia/infarct, EF 62%\par 2. BP hypertensive at baseline, normal response \par \par CAROTID DUPLEX 11/2019:\par 1. There is 1-49% stenosis of the left proximal ICA.\par 2. There is 1-49% stenosis of the right proximal ICA.\par 3. There is antegrade flow in the right and left vertebral arteries.\par 4. No change compared to 2018 study\par \par ECHO 11/2019:\par 1. Technically difficult study due to body habitus.\par 2. Normal LV size, systolic and diastolic function. EF 60-65%\par 3. Normal RV/LA/RA \par 4. There is borderline dilatation of the ascending aorta. Asc Ao 3.9cm\par \par \par PHARM NUCLEAR STRESS TSET 5/2018:\par 1. Normal Sestamibi myocardial perfusion SPECT imaging with artifact as noted above.\par 2. Left ventricular function was normal with an EF of 67%.\par 3. The EKG was negative for ischemia.\par 4. The blood pressure response was hypertensive baseline with normal response to Regadenason.\par 5. The patient developed occasional PVCs \par 6. When compared to prior study dated 2017, there is no longer evidence of ischemia.\par

## 2022-05-03 ENCOUNTER — TRANSCRIPTION ENCOUNTER (OUTPATIENT)
Age: 67
End: 2022-05-03

## 2022-05-03 ENCOUNTER — FORM ENCOUNTER (OUTPATIENT)
Age: 67
End: 2022-05-03

## 2022-05-04 ENCOUNTER — TRANSCRIPTION ENCOUNTER (OUTPATIENT)
Age: 67
End: 2022-05-04

## 2022-05-04 ENCOUNTER — APPOINTMENT (OUTPATIENT)
Dept: ORTHOPEDIC SURGERY | Facility: HOSPITAL | Age: 67
End: 2022-05-04

## 2022-05-04 ENCOUNTER — OUTPATIENT (OUTPATIENT)
Dept: OUTPATIENT SERVICES | Facility: HOSPITAL | Age: 67
LOS: 1 days | End: 2022-05-04
Payer: MEDICARE

## 2022-05-04 DIAGNOSIS — Z98.890 OTHER SPECIFIED POSTPROCEDURAL STATES: Chronic | ICD-10-CM

## 2022-05-04 PROCEDURE — 27447 TOTAL KNEE ARTHROPLASTY: CPT | Mod: AS,RT

## 2022-05-05 ENCOUNTER — TRANSCRIPTION ENCOUNTER (OUTPATIENT)
Age: 67
End: 2022-05-05

## 2022-05-05 PROCEDURE — 20985 CPTR-ASST DIR MS PX: CPT

## 2022-05-05 PROCEDURE — C1776: CPT

## 2022-05-05 PROCEDURE — 73560 X-RAY EXAM OF KNEE 1 OR 2: CPT

## 2022-05-05 PROCEDURE — 80048 BASIC METABOLIC PNL TOTAL CA: CPT

## 2022-05-05 PROCEDURE — 27447 TOTAL KNEE ARTHROPLASTY: CPT | Mod: RT

## 2022-05-05 PROCEDURE — 82962 GLUCOSE BLOOD TEST: CPT

## 2022-05-05 PROCEDURE — C1713: CPT

## 2022-05-05 PROCEDURE — 97163 PT EVAL HIGH COMPLEX 45 MIN: CPT

## 2022-05-05 PROCEDURE — 36415 COLL VENOUS BLD VENIPUNCTURE: CPT

## 2022-05-05 PROCEDURE — 85027 COMPLETE CBC AUTOMATED: CPT

## 2022-05-05 RX ORDER — CHOLECALCIFEROL (VITAMIN D3) 125 MCG
1 CAPSULE ORAL
Qty: 0 | Refills: 0 | DISCHARGE

## 2022-05-05 RX ORDER — ASPIRIN/CALCIUM CARB/MAGNESIUM 324 MG
1 TABLET ORAL
Qty: 0 | Refills: 0 | DISCHARGE

## 2022-05-05 RX ORDER — FLURBIPROFEN 100 MG
1 TABLET ORAL
Qty: 0 | Refills: 0 | DISCHARGE

## 2022-05-10 ENCOUNTER — APPOINTMENT (OUTPATIENT)
Dept: PULMONOLOGY | Facility: CLINIC | Age: 67
End: 2022-05-10

## 2022-05-16 DIAGNOSIS — M17.11 UNILATERAL PRIMARY OSTEOARTHRITIS, RIGHT KNEE: ICD-10-CM

## 2022-05-17 PROBLEM — E66.9 OBESITY, UNSPECIFIED: Chronic | Status: ACTIVE | Noted: 2022-04-19

## 2022-05-17 PROBLEM — F41.9 ANXIETY DISORDER, UNSPECIFIED: Chronic | Status: ACTIVE | Noted: 2022-04-19

## 2022-05-17 PROBLEM — E78.5 HYPERLIPIDEMIA, UNSPECIFIED: Chronic | Status: ACTIVE | Noted: 2022-04-19

## 2022-05-17 PROBLEM — J44.9 CHRONIC OBSTRUCTIVE PULMONARY DISEASE, UNSPECIFIED: Chronic | Status: ACTIVE | Noted: 2022-04-19

## 2022-05-17 PROBLEM — G47.33 OBSTRUCTIVE SLEEP APNEA (ADULT) (PEDIATRIC): Chronic | Status: ACTIVE | Noted: 2022-04-19

## 2022-05-26 ENCOUNTER — APPOINTMENT (OUTPATIENT)
Dept: ORTHOPEDIC SURGERY | Facility: CLINIC | Age: 67
End: 2022-05-26
Payer: MEDICARE

## 2022-05-26 VITALS
BODY MASS INDEX: 41.65 KG/M2 | DIASTOLIC BLOOD PRESSURE: 87 MMHG | HEIGHT: 65 IN | SYSTOLIC BLOOD PRESSURE: 135 MMHG | WEIGHT: 250 LBS | HEART RATE: 64 BPM

## 2022-05-26 DIAGNOSIS — Z96.659 PRESENCE OF UNSPECIFIED ARTIFICIAL KNEE JOINT: ICD-10-CM

## 2022-05-26 DIAGNOSIS — Z47.1 AFTERCARE FOLLOWING JOINT REPLACEMENT SURGERY: ICD-10-CM

## 2022-05-26 PROCEDURE — 99024 POSTOP FOLLOW-UP VISIT: CPT

## 2022-05-26 PROCEDURE — 73562 X-RAY EXAM OF KNEE 3: CPT | Mod: RT

## 2022-05-26 NOTE — HISTORY OF PRESENT ILLNESS
[de-identified] : S/P Right computer assisted TKR, DOS: 5/4/22. [de-identified] : He  is doing well s/p right total knee replacement. The patient ambulates with a cane. He  completed in home physical therapy and is due to begin outpatient PT soon. He  is taking Oxycodone, Celebrex and Tylenol for pain and pain level is controlled. He  is taking aspirin for DVT ppx. Patient denies any fevers chills or constitutional symptoms. Patient denies any falls or Trauma. Overall patient is doing well.  [de-identified] : Exam of the right knee shows a well healed incision, dependent erythema over the anterior lower leg, 0 to 122 degrees of flexion measured with a goniometer. Anterior lower leg erythema with mild TTP, which improves with elevation. \par 5/5 motor strength bilaterally distally. Sensation intact distally.		  [de-identified] : X-ray: 3 views of the right knee demonstrate a total knee replacement in good alignment with a well centered patella, without evidence of loosening or subsidence, and no fracture.		  [de-identified] : The patient is doing very well 3 weeks after right TKR. The patient will take a 1 week course of Keflex as a prophylactic for possible early lower leg cellulitis.  The patient will be transitioned to outpatient physical therapy and a prescription was given for that.  The patient will continue aspirin 325 mg twice per day for DVT prophylaxis for the next 3 weeks. The importance of physical therapy and achieving full knee extension as well as progressing knee flexion was reinforced. Overall the patient is very happy with their outcome so far and his ROM is ahead of schedule.  As long as erythema does not worsen and it resolved, he may followup in 3 weeks with repeat x-rays

## 2022-05-26 NOTE — ADDENDUM
[FreeTextEntry1] : This note was authored by Clayton Cox working as a medical scribe for Dr. Daniel Joyce. The note was reviewed, edited, and revised by Dr. Daniel Joyce whom is in agreement with the exam findings, imaging findings, and treatment plan. 05/26/2022

## 2022-06-17 ENCOUNTER — APPOINTMENT (OUTPATIENT)
Dept: ORTHOPEDIC SURGERY | Facility: CLINIC | Age: 67
End: 2022-06-17
Payer: MEDICARE

## 2022-06-17 VITALS
SYSTOLIC BLOOD PRESSURE: 147 MMHG | HEART RATE: 66 BPM | BODY MASS INDEX: 41.65 KG/M2 | WEIGHT: 250 LBS | DIASTOLIC BLOOD PRESSURE: 82 MMHG | HEIGHT: 65 IN

## 2022-06-17 PROCEDURE — 73562 X-RAY EXAM OF KNEE 3: CPT | Mod: RT

## 2022-06-17 PROCEDURE — 99024 POSTOP FOLLOW-UP VISIT: CPT

## 2022-06-17 NOTE — ADDENDUM
[FreeTextEntry1] : This note was authored by Clayton Cox working as a medical scribe for Dr. Daniel Joyce. The note was reviewed, edited, and revised by Dr. Daniel Joyce whom is in agreement with the exam findings, imaging findings, and treatment plan. 06/17/2022

## 2022-06-17 NOTE — HISTORY OF PRESENT ILLNESS
[de-identified] : S/P Right computer assisted TKR, DOS: 5/4/22.  [de-identified] : KIMBERLY PALUMBO is a 67 year male 6 weeks s/p right TKR.  He  is ambulating and transferring well with a cane. He  reports continuing outpatient physical therapy with good improvement of strength. He continues aspirin for DVT prophylaxis. He  has returned to daily activities of life without significant pain or discomfort. Overall, he is very happy with the results of the surgery.		  [de-identified] : Exam of the right knee shows a well healed incision with no erythema, 0 to 123 degrees of flexion measured with a goniometer. \par 5/5 motor strength bilaterally distally. Sensation intact distally.		  [de-identified] : X-ray: 3 views of the right knee demonstrate a total knee replacement in good alignment with a well centered patella, without evidence of loosening or subsidence, and no fracture.		  [de-identified] : The patient is doing very well 6 weeks following right total knee replacement. The patient will continue outpatient physical therapy and gradually progress toward home exercises. Importance of knee flexion and knee extension was reinforced to the patient again. The patient was advised to gradually taper down narcotic pain medication over the next 6 weeks. The patient may stop taking aspirin full-strength at this point and return to preoperative aspirin dosing if applicable. Overall the patient is making excellent progress and is very happy with their result so far. Follow up in 6 weeks for repeat evaluation.

## 2022-08-01 ENCOUNTER — RX RENEWAL (OUTPATIENT)
Age: 67
End: 2022-08-01

## 2022-08-04 ENCOUNTER — APPOINTMENT (OUTPATIENT)
Dept: CARDIOLOGY | Facility: CLINIC | Age: 67
End: 2022-08-04

## 2022-08-04 ENCOUNTER — NON-APPOINTMENT (OUTPATIENT)
Age: 67
End: 2022-08-04

## 2022-08-04 VITALS — SYSTOLIC BLOOD PRESSURE: 128 MMHG | DIASTOLIC BLOOD PRESSURE: 84 MMHG

## 2022-08-04 VITALS
RESPIRATION RATE: 16 BRPM | HEIGHT: 65 IN | BODY MASS INDEX: 41.48 KG/M2 | SYSTOLIC BLOOD PRESSURE: 140 MMHG | DIASTOLIC BLOOD PRESSURE: 88 MMHG | WEIGHT: 249 LBS | HEART RATE: 72 BPM

## 2022-08-04 DIAGNOSIS — Z86.79 PERSONAL HISTORY OF OTHER DISEASES OF THE CIRCULATORY SYSTEM: ICD-10-CM

## 2022-08-04 PROCEDURE — 99214 OFFICE O/P EST MOD 30 MIN: CPT

## 2022-08-04 PROCEDURE — 93000 ELECTROCARDIOGRAM COMPLETE: CPT

## 2022-08-04 RX ORDER — PANTOPRAZOLE 40 MG/1
40 TABLET, DELAYED RELEASE ORAL
Qty: 42 | Refills: 0 | Status: ACTIVE | COMMUNITY
Start: 2022-05-05

## 2022-08-04 RX ORDER — METHYLPREDNISOLONE ACETATE 40 MG/ML
40 INJECTION, SUSPENSION INTRA-ARTICULAR; INTRALESIONAL; INTRAMUSCULAR; SOFT TISSUE
Qty: 0.5 | Refills: 0 | Status: DISCONTINUED | COMMUNITY
Start: 2021-06-08 | End: 2022-08-04

## 2022-08-04 RX ORDER — CEPHALEXIN 500 MG/1
500 TABLET ORAL
Qty: 28 | Refills: 0 | Status: DISCONTINUED | COMMUNITY
Start: 2022-05-26 | End: 2022-08-04

## 2022-08-04 RX ORDER — OXYCODONE 5 MG/1
5 TABLET ORAL
Qty: 30 | Refills: 0 | Status: DISCONTINUED | COMMUNITY
Start: 2022-05-26 | End: 2022-08-04

## 2022-08-04 RX ORDER — METOPROLOL SUCCINATE 25 MG/1
25 TABLET, EXTENDED RELEASE ORAL
Qty: 90 | Refills: 3 | Status: DISCONTINUED | COMMUNITY
Start: 2018-11-14 | End: 2022-08-04

## 2022-08-04 RX ORDER — NITROFURANTOIN (MONOHYDRATE/MACROCRYSTALS) 25; 75 MG/1; MG/1
100 CAPSULE ORAL
Qty: 20 | Refills: 0 | Status: DISCONTINUED | COMMUNITY
Start: 2022-07-14

## 2022-08-04 RX ORDER — DICLOFENAC SODIUM 1% 10 MG/G
1 GEL TOPICAL
Qty: 1 | Refills: 2 | Status: DISCONTINUED | COMMUNITY
Start: 2021-06-08 | End: 2022-08-04

## 2022-08-04 NOTE — DISCUSSION/SUMMARY
[FreeTextEntry1] : Patient is a 66yo M with mild non-obstructive CAD (coronary calcifications seen on CT), HTN,HLD, obesity MARY, mild carotid stenosis here for cardiac follow up.\par -Carotid with stable disease 1/2021, med management \par -Echo 1/2021 with preserved BiV function, aorta only borderline dilated. Mild AI, surveillance only\par -Nuclear stress testing 1/2021 without ischemia \par -ECG with LAFB, \par -NEeds better dietary habits and weight loss still . Re-enforced need to stop eating fast food, has continued to gain weight.  \par \par 1. NOting increased BP in hospital, likely from acute illness and anxiety. Also increase PVCS possilbe acute illness related and will arrange holter to evaluate burden on higher dose metop. ECho as well\par 2. Continue current antihypertensives, blood pressure is well controlled \par 3. Recommend aggressive diet and lifestyle modifications, counselled on weight loss. \par 4. Recommend 30 minutes moderate intensity aerobic activity 5 days per week as tolerated by knees\par 5. CPAP for MARY, pulm follow up \par 6. Will refill BP meds when needed\par 7. Renal follow up for hyponatremia\par 8. Continue medical management of CAD/carotid stenosis. Carotid now for surveillance\par 9. Follow up 1 month

## 2022-08-04 NOTE — HISTORY OF PRESENT ILLNESS
[FreeTextEntry1] : Patient is a 68yo M with h/o mild CAD, HTN, COPD, MARY here for cardiac follow up. HAd knee replacement in [place] 2022. Then was recently in hospital for E Coli UTI and sepsis. Had symptoms of palps/SOB/dizziness/chills. Put on antibiotics and improved. Also CT scan with bladder calculus. Was discharged today.  Was having frequent PVCS and hypertensive, Metoprolol increased to 50mg daily. One blood culture in ED positive for E Coli, all rest negative. \par \par Feeling a bit SOB today coming up to office. Denies any chest pain. Using CPAP at night without O2, autoregulated. Minimal SOB prior to infection, was a bit SOB with PT though. No CP. Patient denies PND/orthopnea/edema/palpitations/syncope/claudication. \par \par Works at [employer] nights part time. Daytime occasionally works as .  \par \par ROS: GI and  negative

## 2022-08-04 NOTE — ASSESSMENT
[FreeTextEntry1] : ECG: SR, LAFB, early transition \par \par  HDL 59 LDL 61 TG 79 (9/2021) \par  HDL 60 LDL 56 TG 63 (11/2020)\par  HDL 64 LDL 59 TG 67 (8/2019) \par  HDL 69 LDL 46 TG 61 (6/2018) \par \par CAROTID 1/2021:\par 1. 1-49% ICA stenosis bilaterally\par 2. Antegrade flow in the vertebral arteries bilaterally\par \par ECHO 1/2021:\par 1. Normal LV size, systolic and diastolic function. EF 60-65%\par 2. Borderline LAE\par 3. Normal RV/RA\par 4. Mild AI\par 5. Borderline dilated aorta at 3.9cm\par \par PHARM NUCLEAR STRESS TEST 1/2021\par 1. Negative for ischemia/infarct, EF 62%\par 2. BP hypertensive at baseline, normal response \par \par CAROTID DUPLEX 11/2019:\par 1. There is 1-49% stenosis of the left proximal ICA.\par 2. There is 1-49% stenosis of the right proximal ICA.\par 3. There is antegrade flow in the right and left vertebral arteries.\par 4. No change compared to 2018 study\par \par ECHO 11/2019:\par 1. Technically difficult study due to body habitus.\par 2. Normal LV size, systolic and diastolic function. EF 60-65%\par 3. Normal RV/LA/RA \par 4. There is borderline dilatation of the ascending aorta. Asc Ao 3.9cm\par \par \par PHARM NUCLEAR STRESS TSET 5/2018:\par 1. Normal Sestamibi myocardial perfusion SPECT imaging with artifact as noted above.\par 2. Left ventricular function was normal with an EF of 67%.\par 3. The EKG was negative for ischemia.\par 4. The blood pressure response was hypertensive baseline with normal response to Regadenason.\par 5. The patient developed occasional PVCs \par 6. When compared to prior study dated 2017, there is no longer evidence of ischemia.\par

## 2022-08-22 ENCOUNTER — APPOINTMENT (OUTPATIENT)
Dept: PULMONOLOGY | Facility: CLINIC | Age: 67
End: 2022-08-22

## 2022-08-22 VITALS
RESPIRATION RATE: 16 BRPM | HEART RATE: 62 BPM | SYSTOLIC BLOOD PRESSURE: 126 MMHG | WEIGHT: 245 LBS | OXYGEN SATURATION: 96 % | DIASTOLIC BLOOD PRESSURE: 80 MMHG | BODY MASS INDEX: 40.77 KG/M2

## 2022-08-22 PROCEDURE — 99214 OFFICE O/P EST MOD 30 MIN: CPT

## 2022-08-22 RX ORDER — CEFPODOXIME PROXETIL 100 MG/1
100 TABLET, FILM COATED ORAL
Refills: 0 | Status: DISCONTINUED | COMMUNITY
End: 2022-08-22

## 2022-08-22 NOTE — HISTORY OF PRESENT ILLNESS
[TextBox_4] : The patient had successful knee replacement surgery in May. In July, he was hospitalized at [facility] with urosepsis with Escherichia coli. He was treated with antibiotics IV and then oral. Currently feels well. The patient had an episode of shortness of breath treated with nebulizer and oxygen in the hospital.\par \par  [Obstructive Sleep Apnea] : obstructive sleep apnea [Home] : home [BPAP:] : BPAP [TextBox_100] : 2/17 [TextBox_108] : 115 [TextBox_112] : 97 [TextBox_116] : 77 [TextBox_135] : auto  pap mac 20, epap min 8 ps5 [TextBox_127] : 7/22 [TextBox_129] : 8/22 [TextBox_133] : 80 [TextBox_137] : 80 [TextBox_141] : 7 [TextBox_143] : 2 [TextBox_147] : 7.3 [TextBox_165] : The patient's current machine is at the end of its useful life.

## 2022-08-22 NOTE — ASSESSMENT
[FreeTextEntry1] : Severe obstructive sleep apnea on auto BiPAP with excellent compliance and benefit. Current machine is at the end of its useful life and the machine was ordered.\par \par Recent episode of urosepsis. Following up with urologist.\par \par Followup here in one year.

## 2022-09-02 ENCOUNTER — APPOINTMENT (OUTPATIENT)
Dept: ORTHOPEDIC SURGERY | Facility: CLINIC | Age: 67
End: 2022-09-02

## 2022-09-02 VITALS
SYSTOLIC BLOOD PRESSURE: 151 MMHG | HEIGHT: 65 IN | HEART RATE: 62 BPM | BODY MASS INDEX: 40.82 KG/M2 | WEIGHT: 245 LBS | DIASTOLIC BLOOD PRESSURE: 92 MMHG

## 2022-09-02 PROCEDURE — 99213 OFFICE O/P EST LOW 20 MIN: CPT

## 2022-09-02 PROCEDURE — 73562 X-RAY EXAM OF KNEE 3: CPT | Mod: RT

## 2022-09-02 NOTE — PHYSICAL EXAM
[de-identified] : The patient appears well nourished  and in no apparent distress.  The patient is alert and oriented to person, place, and time.   Affect and mood appear normal. The head is normocephalic and atraumatic.  The eyes reveal normal sclera and extra ocular muscles are intact. The tongue is midline with no apparent lesions.  Skin shows normal turgor with no evidence of eczema or psoriasis.  No respiratory distress noted.  Sensation grossly intact.		  [de-identified] : Exam of the right knee shows a well healed incision, 0 to 130 degrees of flexion measured with a goniometer. There is no effusion. There is no instability. \par Exam of the left knee shows 0 to 115 degrees of flexion measured with a goniometer. \par 5/5 motor strength bilaterally distally. Sensation intact distally.		 		  [de-identified] : X-ray: 3 views of the right knee demonstrate a total knee replacement in good alignment with a well centered patella, without evidence of loosening or subsidence, and no fracture.	\par \par X-ray: 4 views of the left knee demonstrate bone on bone valgus arthritis.

## 2022-09-02 NOTE — ADDENDUM
[FreeTextEntry1] : This note was authored by Clayton Cox working as a medical scribe for Dr. Daniel Joyce. The note was reviewed, edited, and revised by Dr. Daniel Joyce whom is in agreement with the exam findings, imaging findings, and treatment plan. 09/02/2022

## 2022-09-02 NOTE — DISCUSSION/SUMMARY
Please review encounter & close   [de-identified] : The patient is doing very well 3 months following right total knee replacement. The patient will continue their home exercises. Overall  he  is making excellent progress and is very happy with the result so far.  Dental prophylaxis was reviewed. Follow up one year post op for radiographic surveillance.  		 \par \par He also presents with left knee bone on bone valgus arthritis. The patient would like to receive an intraarticular cortisone injection in the left knee before his  November trip to [place]. However he understands he cannot do this until his urinary tract issues are resolved and he is off antibiotics. He will follow up for this injection when said issues are resolved.

## 2022-09-02 NOTE — HISTORY OF PRESENT ILLNESS
[de-identified] : KIMBERLY PALUMBO is a 67 year male 3 months s/p right TKR. The patient ambulates with a cane.  He reports completing outpatient physical therapy and transitioning to a home exercise routine. He has returned to daily activities of life without significant pain or discomfort. Overall, he is very happy with the result of the surgery. He also complains of left knee pain from previously diagnosed osteoarthritis in the knee. His pain is worse laterally, is constant, and similar to the pain he had in his right knee prior to surgery. It is worst with weightbearing activities. He presents today to follow up.

## 2022-09-02 NOTE — REASON FOR VISIT
[Follow-Up Visit] : a follow-up visit for [Other: ____] : [unfilled] [FreeTextEntry2] : S/P Right computer assisted TKR, DOS: 5/4/22. \par \par

## 2022-09-20 ENCOUNTER — APPOINTMENT (OUTPATIENT)
Dept: CARDIOLOGY | Facility: CLINIC | Age: 67
End: 2022-09-20

## 2022-09-20 PROCEDURE — 93880 EXTRACRANIAL BILAT STUDY: CPT

## 2022-09-20 PROCEDURE — 93306 TTE W/DOPPLER COMPLETE: CPT

## 2022-09-20 RX ORDER — PERFLUTREN 6.52 MG/ML
6.52 INJECTION, SUSPENSION INTRAVENOUS
Qty: 2 | Refills: 0 | Status: COMPLETED | OUTPATIENT
Start: 2022-09-20

## 2022-09-20 RX ADMIN — PERFLUTREN MG/ML: 6.52 INJECTION, SUSPENSION INTRAVENOUS at 00:00

## 2022-09-28 ENCOUNTER — APPOINTMENT (OUTPATIENT)
Dept: CARDIOLOGY | Facility: CLINIC | Age: 67
End: 2022-09-28

## 2022-09-30 ENCOUNTER — APPOINTMENT (OUTPATIENT)
Dept: CARDIOLOGY | Facility: CLINIC | Age: 67
End: 2022-09-30

## 2022-09-30 VITALS
HEIGHT: 65 IN | BODY MASS INDEX: 40.65 KG/M2 | RESPIRATION RATE: 16 BRPM | SYSTOLIC BLOOD PRESSURE: 140 MMHG | DIASTOLIC BLOOD PRESSURE: 84 MMHG | WEIGHT: 244 LBS | HEART RATE: 56 BPM

## 2022-09-30 PROCEDURE — 99214 OFFICE O/P EST MOD 30 MIN: CPT

## 2022-09-30 RX ORDER — VANCOMYCIN HYDROCHLORIDE 125 MG/1
CAPSULE ORAL
Refills: 0 | Status: DISCONTINUED | COMMUNITY
End: 2022-09-30

## 2022-09-30 RX ORDER — CHOLECALCIFEROL (VITAMIN D3) 125 MCG
CAPSULE ORAL
Refills: 0 | Status: ACTIVE | COMMUNITY

## 2022-09-30 NOTE — HISTORY OF PRESENT ILLNESS
[FreeTextEntry1] : Patient is a 66yo M with h/o mild CAD, HTN, COPD, MARY here for cardiac follow up. HAd knee replacement in [place] 2022. Then was recently in hospital for E Coli UTI and sepsis. Had symptoms of palps/SOB/dizziness/chills. Put on antibiotics and improved. Also CT scan with bladder calculus. Was discharged day of last OV.  Was having frequent PVCS and hypertensive, Metoprolol increased to 50mg daily. One blood culture in ED positive for E Coli, all rest negative. \par \par Using CPAP at night without O2, autoregulated. No CP/SOB. Patient denies PND/orthopnea/edema/palpitations/syncope/claudication. Limited by left knee pain. Persistent UTI now finally cleared\par \par Works at [employer] part time. Daytime occasionally works as .  \par \par ROS: GI and  negative

## 2022-09-30 NOTE — ASSESSMENT
[FreeTextEntry1] : ECG: SR, LAFB, early transition \par \par  HDL 59 LDL 61 TG 79 (9/2021) \par  HDL 60 LDL 56 TG 63 (11/2020)\par  HDL 64 LDL 59 TG 67 (8/2019) \par  HDL 69 LDL 46 TG 61 (6/2018) \par \par CAROTID 9/2022:\par 1. 1-49% ICA stenosis bilaterally\par 2. Antegrade flow in the vertebral arteries bilaterally\par 3. No change from prior \par \par CAROTID 1/2021:\par 1. 1-49% ICA stenosis bilaterally\par 2. Antegrade flow in the vertebral arteries bilaterally\par \par ECHO 9/2022:\par 1. Mild LVH, EF 55-60%\par 2. Grade I diastolic dysfunction \par 3. Normal RV/LA/RA \par 4. Trivial AI\par 5. Borderline dilated aortic root/ascending aorta\par \par ECHO 1/2021:\par 1. Normal LV size, systolic and diastolic function. EF 60-65%\par 2. Borderline LAE\par 3. Normal RV/RA\par 4. Mild AI\par 5. Borderline dilated aorta at 3.9cm\par \par PHARM NUCLEAR STRESS TEST 1/2021\par 1. Negative for ischemia/infarct, EF 62%\par 2. BP hypertensive at baseline, normal response \par \par CAROTID DUPLEX 11/2019:\par 1. There is 1-49% stenosis of the left proximal ICA.\par 2. There is 1-49% stenosis of the right proximal ICA.\par 3. There is antegrade flow in the right and left vertebral arteries.\par 4. No change compared to 2018 study\par \par ECHO 11/2019:\par 1. Technically difficult study due to body habitus.\par 2. Normal LV size, systolic and diastolic function. EF 60-65%\par 3. Normal RV/LA/RA \par 4. There is borderline dilatation of the ascending aorta. Asc Ao 3.9cm\par \par \par PHARM NUCLEAR STRESS TSET 5/2018:\par 1. Normal Sestamibi myocardial perfusion SPECT imaging with artifact as noted above.\par 2. Left ventricular function was normal with an EF of 67%.\par 3. The EKG was negative for ischemia.\par 4. The blood pressure response was hypertensive baseline with normal response to Regadenason.\par 5. The patient developed occasional PVCs \par 6. When compared to prior study dated 2017, there is no longer evidence of ischemia.\par

## 2022-09-30 NOTE — DISCUSSION/SUMMARY
[FreeTextEntry1] : Patient is a 66yo M with mild non-obstructive CAD (coronary calcifications seen on CT), HTN,HLD, obesity MARY, mild carotid stenosis here for cardiac follow up.\par -Carotid with stable disease 9/2022, med management \par -Echo 9/2022 with preserved BiV function, aorta only borderline dilated. Trivial AI, mild LVH\par -Nuclear stress testing 1/2021 without ischemia \par -Holter 8/2022 with SR and PVCs with low burden, no events\par -NEeds better dietary habits and weight loss still \par -BP running much lower at home, no med changes\par -Limited in activity now due to left knee, may need replaced as had right done\par \par 1. CV stable at this time\par 2. Continue current antihypertensives, blood pressure is well controlled \par 3. Recommend aggressive diet and lifestyle modifications, counselled on weight loss. \par 4. Recommend 30 minutes moderate intensity aerobic activity 5 days per week as tolerated by knees\par 5. CPAP for MARY, pulm follow up \par 6. Continue medical management of CAD/carotid stenosis.\par 7. Follow up 4 months

## 2022-10-13 ENCOUNTER — APPOINTMENT (OUTPATIENT)
Dept: ORTHOPEDIC SURGERY | Facility: CLINIC | Age: 67
End: 2022-10-13

## 2022-10-13 VITALS
BODY MASS INDEX: 40.65 KG/M2 | HEIGHT: 65 IN | DIASTOLIC BLOOD PRESSURE: 87 MMHG | HEART RATE: 61 BPM | WEIGHT: 244 LBS | SYSTOLIC BLOOD PRESSURE: 138 MMHG

## 2022-10-13 DIAGNOSIS — M17.12 UNILATERAL PRIMARY OSTEOARTHRITIS, LEFT KNEE: ICD-10-CM

## 2022-10-13 DIAGNOSIS — Z96.651 PRESENCE OF RIGHT ARTIFICIAL KNEE JOINT: ICD-10-CM

## 2022-10-13 PROCEDURE — 99214 OFFICE O/P EST MOD 30 MIN: CPT | Mod: 25

## 2022-10-13 PROCEDURE — 20610 DRAIN/INJ JOINT/BURSA W/O US: CPT

## 2022-10-13 NOTE — PROCEDURE
[de-identified] : Using sterile technique, 2cc of depomedrol 40mg/ml, 4cc of 1% plain lidocaine, and 2 cc 0.25% marcaine was drawn up into a sterile syringe. The left knee was then sterilely prepped with chlorhexidine. Ethyl chloride spray was used to anesthetize the skin and subQ tissue.  The depomedrol/lidocaine/marcaine mixture was then injected into the knee joint in the anterolateral position. The patient tolerated the procedure well without difficulty. The patient was given instructions on the use of ice and anti-inflammatories post injection site soreness.

## 2022-10-13 NOTE — DISCUSSION/SUMMARY
[de-identified] : KIMBERLY PALUMBO is a 67 year male who presents s/p right TKR with left knee bone on bone valgus arthritis and right knee PES bursitis. The patient received an intraarticular cortisone injection in the left knee in the office today. The patient understands that his UTI needs to be totally resolved without recurrence prior to undergoing left TKR surgery. He will follow up in 6 to 8 weeks. As for his right knee PES bursitis, a course of Celebrex was recommended and topical voltaren as needed. The patient was given a prescription for the Celebrex with directions. He was instructed to stop the medicine and call the office if there are any adverse reaction to the medicine. He was also instructed to consult with their primary care doctor prior to starting the medication.\par

## 2022-10-13 NOTE — HISTORY OF PRESENT ILLNESS
[de-identified] : KIMBERLY PALUMBO is a 67 year male who presents with bilateral knee pain. He is s/p right TKR 5/4/2022. He was previously on antibiotics for a UTI which has resolved. He was hospitalized for this issue and it also led him to develop sepsis.

## 2022-10-13 NOTE — ADDENDUM
[FreeTextEntry1] : This note was authored by Clayton Cox working as a medical scribe for Dr. Daniel Joyce. The note was reviewed, edited, and revised by Dr. Daniel Joyce whom is in agreement with the exam findings, imaging findings, and treatment plan. 10/13/2022

## 2022-10-13 NOTE — PHYSICAL EXAM
[de-identified] : The patient appears well nourished  and in no apparent distress.  The patient is alert and oriented to person, place, and time.   Affect and mood appear normal. The head is normocephalic and atraumatic.  The eyes reveal normal sclera and extra ocular muscles are intact. The tongue is midline with no apparent lesions.  Skin shows normal turgor with no evidence of eczema or psoriasis.  No respiratory distress noted.  Sensation grossly intact.		  [de-identified] : Exam of the right knee shows a well healed incision, 0 to 127 degrees of flexion measured with a goniometer. There is no effusion. There is tenderness over the PES bursa.\par Exam of the left knee shows 0 to 105 degrees of flexion measured with a goniometer. There is an effusion. There is pain with flexion. \par 5/5 motor strength bilaterally distally. Sensation intact distally.  [de-identified] : Prior X-ray: 3 views of the right knee demonstrate a total knee replacement in good alignment with a well centered patella, without evidence of loosening or subsidence, and no fracture.	\par \par Prior X-ray: 4 views of the left knee demonstrate bone on bone valgus arthritis.

## 2022-10-13 NOTE — REASON FOR VISIT
[Follow-Up Visit] : a follow-up visit for [Other: ____] : [unfilled] [FreeTextEntry2] : S/P Right computer assisted TKR, DOS: 5/4/22. \par

## 2022-11-28 ENCOUNTER — APPOINTMENT (OUTPATIENT)
Dept: PULMONOLOGY | Facility: CLINIC | Age: 67
End: 2022-11-28

## 2022-11-28 VITALS
HEART RATE: 58 BPM | SYSTOLIC BLOOD PRESSURE: 132 MMHG | OXYGEN SATURATION: 98 % | DIASTOLIC BLOOD PRESSURE: 86 MMHG | HEIGHT: 65 IN | RESPIRATION RATE: 16 BRPM | BODY MASS INDEX: 41.15 KG/M2 | WEIGHT: 247 LBS

## 2022-11-28 PROCEDURE — 99214 OFFICE O/P EST MOD 30 MIN: CPT

## 2022-11-28 NOTE — HISTORY OF PRESENT ILLNESS
[Obstructive Sleep Apnea] : obstructive sleep apnea [Home] : home [BPAP:] : BPAP [TextBox_100] : 2/17 [TextBox_108] : 115 [TextBox_112] : 97 [TextBox_116] : 77 [TextBox_135] : auto  pap max 20, epap min 8 ps5 [TextBox_127] : 10/22 [TextBox_129] : 11/22 [TextBox_133] : 100 [TextBox_137] : 100 [TextBox_141] : 7 [TextBox_143] : 52 [TextBox_147] : 1.7 [TextBox_165] : Alert on his new BiPAP.

## 2022-11-28 NOTE — ASSESSMENT
[FreeTextEntry1] : Severe obstructive sleep apnea with excellent compliance and benefit from BiPAP.  Mild COPD not on medications.  Patient will be going for knee replacement in about 2 months.  When he gets his operative date, he will return for clearance before that.  Otherwise to continue on auto BiPAP.

## 2022-12-23 ENCOUNTER — APPOINTMENT (OUTPATIENT)
Dept: ORTHOPEDIC SURGERY | Facility: CLINIC | Age: 67
End: 2022-12-23

## 2022-12-23 VITALS
HEIGHT: 65 IN | BODY MASS INDEX: 41.15 KG/M2 | HEART RATE: 73 BPM | DIASTOLIC BLOOD PRESSURE: 93 MMHG | WEIGHT: 247 LBS | SYSTOLIC BLOOD PRESSURE: 170 MMHG

## 2022-12-23 PROCEDURE — 73564 X-RAY EXAM KNEE 4 OR MORE: CPT | Mod: LT

## 2022-12-23 PROCEDURE — 99213 OFFICE O/P EST LOW 20 MIN: CPT

## 2022-12-23 NOTE — HISTORY OF PRESENT ILLNESS
[de-identified] : KIMBERLY PALUMBO is a 67 year male who presents with left knee pain. The patient ambulates with a cane. He has previously been diagnosed with osteoarthritis in the knee. He has had cortisone injections (most recently on 10/13/2022) in his left knee with minimal short lasting relief. Yesterday he found out his PSA is elevated and he will be on Bactrim for the next two weeks. He also reports a history of an enlarged prostate and recurrent UTI's. He was planning on left TKR in February 2023. He presents today to follow up.

## 2022-12-23 NOTE — PHYSICAL EXAM
[de-identified] : The patient appears well nourished  and in no apparent distress.  The patient is alert and oriented to person, place, and time.   Affect and mood appear normal. The head is normocephalic and atraumatic.  The eyes reveal normal sclera and extra ocular muscles are intact. The tongue is midline with no apparent lesions.  Skin shows normal turgor with no evidence of eczema or psoriasis.  No respiratory distress noted.  Sensation grossly intact.		  [de-identified] : Exam of the right knee shows a well healed incision, 0 to 125 degrees of flexion measured with a goniometer. \par Exam of the left knee shows a valgus alignment which is fully correctable, 3 mm laxity of the LCL, +5 to 116 degrees of flexion measured with a goniometer. \par 5/5 motor strength bilaterally distally. Sensation intact distally.  [de-identified] : X-ray: 4 views of the left knee demonstrate bone on bone valgus arthritis.

## 2022-12-23 NOTE — ADDENDUM
[FreeTextEntry1] : This note was authored by Clayton Cox working as a medical scribe for Dr. Daniel Joyce. The note was reviewed, edited, and revised by Dr. Daniel Joyce whom is in agreement with the exam findings, imaging findings, and treatment plan. 12/23/2022

## 2022-12-23 NOTE — DISCUSSION/SUMMARY
[de-identified] : KIMBERLY PALUMBO is a 67 year old male who presents with left knee bone on bone valgus arthritis. The patient understands that he needs to be free of UTI's for at least 3 months prior to scheduling surgery and as such we will hold off on TKR for now. He also understands that it is too soon for him to receive another cortisone injection in the knee as he needs to wait 3 months in between said injections. The patient will work on weight loss in the meantime in order to limit his risk profile for eventual surgery. The patient will follow up in a few months once his urinary tract issues are resolved.

## 2023-01-11 ENCOUNTER — NON-APPOINTMENT (OUTPATIENT)
Age: 68
End: 2023-01-11

## 2023-01-11 ENCOUNTER — APPOINTMENT (OUTPATIENT)
Dept: CARDIOLOGY | Facility: CLINIC | Age: 68
End: 2023-01-11
Payer: MEDICARE

## 2023-01-11 VITALS
DIASTOLIC BLOOD PRESSURE: 76 MMHG | RESPIRATION RATE: 16 BRPM | HEIGHT: 65 IN | SYSTOLIC BLOOD PRESSURE: 120 MMHG | HEART RATE: 61 BPM | WEIGHT: 251 LBS | OXYGEN SATURATION: 92 % | BODY MASS INDEX: 41.82 KG/M2

## 2023-01-11 PROCEDURE — 93000 ELECTROCARDIOGRAM COMPLETE: CPT

## 2023-01-11 PROCEDURE — 99214 OFFICE O/P EST MOD 30 MIN: CPT

## 2023-01-11 RX ORDER — CELECOXIB 200 MG/1
200 CAPSULE ORAL TWICE DAILY
Qty: 42 | Refills: 0 | Status: DISCONTINUED | COMMUNITY
Start: 2022-10-13 | End: 2023-01-11

## 2023-01-11 NOTE — DISCUSSION/SUMMARY
[FreeTextEntry1] : Patient is a 66yo M with mild non-obstructive CAD (coronary calcifications seen on CT), HTN,HLD, obesity MARY, mild carotid stenosis here for cardiac follow up.\par -Carotid with stable disease 9/2022, med management \par -Echo 9/2022 with preserved BiV function, aorta only borderline dilated. Trivial AI, mild LVH\par -Nuclear stress testing 1/2021 without ischemia \par -Holter 8/2022 with SR and PVCs with low burden, no events\par \par -NEeds better dietary habits and weight loss still \par -BP better controlled now,  no med changes. ECG unremarkable today \par -Limited in activity now due to left knee, may need replaced as had right done\par \par 1. CV stable at this time\par 2. Continue current antihypertensives, blood pressure is well controlled \par 3. Recommend aggressive diet and lifestyle modifications, counselled on weight loss. \par 4. Recommend 30 minutes moderate intensity aerobic activity 5 days per week as tolerated by knees\par 5. CPAP for MARY, pulm follow up \par 6. Continue medical management of CAD/carotid stenosis.\par 7. Possible left TKR once 3 months cleared of UTI, from CV standpoint stable at low-moderate risk \par 8. Follow up 6 months\par 9. Regular urology follow up with Dr Funk [EKG obtained to assist in diagnosis and management of assessed problem(s)] : EKG obtained to assist in diagnosis and management of assessed problem(s)

## 2023-01-11 NOTE — ASSESSMENT
[FreeTextEntry1] : ECG: SR, no significant ST-T abnormalities and normal intervals \par \par  HDL 59 LDL 61 TG 79 (9/2021) \par  HDL 60 LDL 56 TG 63 (11/2020)\par  HDL 64 LDL 59 TG 67 (8/2019) \par  HDL 69 LDL 46 TG 61 (6/2018) \par \par CAROTID 9/2022:\par 1. 1-49% ICA stenosis bilaterally\par 2. Antegrade flow in the vertebral arteries bilaterally\par 3. No change from prior \par \par CAROTID 1/2021:\par 1. 1-49% ICA stenosis bilaterally\par 2. Antegrade flow in the vertebral arteries bilaterally\par \par ECHO 9/2022:\par 1. Mild LVH, EF 55-60%\par 2. Grade I diastolic dysfunction \par 3. Normal RV/LA/RA \par 4. Trivial AI\par 5. Borderline dilated aortic root/ascending aorta\par \par ECHO 1/2021:\par 1. Normal LV size, systolic and diastolic function. EF 60-65%\par 2. Borderline LAE\par 3. Normal RV/RA\par 4. Mild AI\par 5. Borderline dilated aorta at 3.9cm\par \par PHARM NUCLEAR STRESS TEST 1/2021\par 1. Negative for ischemia/infarct, EF 62%\par 2. BP hypertensive at baseline, normal response \par \par CAROTID DUPLEX 11/2019:\par 1. There is 1-49% stenosis of the left proximal ICA.\par 2. There is 1-49% stenosis of the right proximal ICA.\par 3. There is antegrade flow in the right and left vertebral arteries.\par 4. No change compared to 2018 study\par \par ECHO 11/2019:\par 1. Technically difficult study due to body habitus.\par 2. Normal LV size, systolic and diastolic function. EF 60-65%\par 3. Normal RV/LA/RA \par 4. There is borderline dilatation of the ascending aorta. Asc Ao 3.9cm\par \par \par PHARM NUCLEAR STRESS TSET 5/2018:\par 1. Normal Sestamibi myocardial perfusion SPECT imaging with artifact as noted above.\par 2. Left ventricular function was normal with an EF of 67%.\par 3. The EKG was negative for ischemia.\par 4. The blood pressure response was hypertensive baseline with normal response to Regadenason.\par 5. The patient developed occasional PVCs \par 6. When compared to prior study dated 2017, there is no longer evidence of ischemia.\par

## 2023-01-11 NOTE — HISTORY OF PRESENT ILLNESS
[FreeTextEntry1] : Patient is a 68yo M with h/o mild CAD, HTN, COPD, MARY here for cardiac follow up. HAd knee replacement in [place] 2022. Then was  in hospital for E Coli UTI and sepsis. Had symptoms of palps/SOB/dizziness/chills. Put on antibiotics and improved. Also CT scan with bladder calculus. Persistent UTI finally eradicated with antibiotics. Back on Bactrim now after cystoscopy. \par \par Then was having frequent PVCS and hypertensive, Metoprolol increased to 50mg daily. \par \par Using CPAP at night without O2, autoregulated. No CP/SOB. Patient denies PND/orthopnea/edema/palpitations/syncope/claudication. Limited by left knee pain.\par \par Works at [employer] nights part time. Daytime occasionally works as .  \par \par ROS: GI and  negative

## 2023-06-21 ENCOUNTER — APPOINTMENT (OUTPATIENT)
Dept: CARDIOLOGY | Facility: CLINIC | Age: 68
End: 2023-06-21
Payer: MEDICARE

## 2023-06-21 ENCOUNTER — NON-APPOINTMENT (OUTPATIENT)
Age: 68
End: 2023-06-21

## 2023-06-21 VITALS
BODY MASS INDEX: 42.65 KG/M2 | RESPIRATION RATE: 16 BRPM | HEIGHT: 65 IN | HEART RATE: 62 BPM | SYSTOLIC BLOOD PRESSURE: 132 MMHG | WEIGHT: 256 LBS | DIASTOLIC BLOOD PRESSURE: 100 MMHG | OXYGEN SATURATION: 93 %

## 2023-06-21 VITALS — SYSTOLIC BLOOD PRESSURE: 126 MMHG | DIASTOLIC BLOOD PRESSURE: 84 MMHG

## 2023-06-21 PROCEDURE — 99214 OFFICE O/P EST MOD 30 MIN: CPT

## 2023-06-21 PROCEDURE — 93000 ELECTROCARDIOGRAM COMPLETE: CPT

## 2023-06-21 RX ORDER — FLURBIPROFEN 100 MG
100 TABLET ORAL
Refills: 0 | Status: ACTIVE | COMMUNITY

## 2023-06-21 RX ORDER — SULFAMETHOXAZOLE AND TRIMETHOPRIM 800; 160 MG/1; MG/1
TABLET ORAL
Refills: 0 | Status: DISCONTINUED | COMMUNITY
End: 2023-06-21

## 2023-06-21 NOTE — DISCUSSION/SUMMARY
[EKG obtained to assist in diagnosis and management of assessed problem(s)] : EKG obtained to assist in diagnosis and management of assessed problem(s) [FreeTextEntry1] : Patient is a 67yo M with mild non-obstructive CAD (coronary calcifications seen on CT), HTN,HLD, obesity MARY on cPAP, mild carotid stenosis here for cardiac follow up.\par -Carotid with stable disease 9/2022, med management \par -Echo 9/2022 with preserved BiV function, aorta only borderline dilated. Trivial AI, mild LVH\par -Nuclear stress testing 1/2021 without ischemia \par -Holter 8/2022 with SR and PVCs with low burden, no events\par \par -NEeds better dietary habits and weight loss still \par -BP high today but came down on repeat \par -Limited in activity now due to left knee, may need replaced as had right done but he is anxious about it and holding off\par -No new symptoms at this time\par \par 1. CV stable, continue aggressive risk factor modification \par 2. Continue current antihypertensives, blood pressure is well controlled \par 3. Recommend aggressive diet and lifestyle modifications, counselled on weight loss. \par 4. Recommend 30 minutes moderate intensity aerobic activity 5 days per week as tolerated by knees\par 5. CPAP for MARY, pulm follow up \par 6. Continue medical management of CAD/carotid stenosis. No new symptoms at this time \par 7. Regular urology follow up with Dr Funk\par 8. Follow up 6 months

## 2023-06-21 NOTE — HISTORY OF PRESENT ILLNESS
[FreeTextEntry1] : Patient is a 67yo M with h/o mild CAD, HTN, COPD, MARY on CPAP here for cardiac follow up. HAd knee replacement in [place] 2022. Then was  in hospital for E Coli UTI and sepsis. Had symptoms of palps/SOB/dizziness/chills. Put on antibiotics and improved. Also CT scan with bladder calculus. Persistent UTI finally eradicated with antibiotics. NO recurrent infection at this time. \par \par Then was having frequent PVCS and hypertensive, Metoprolol increased to 50mg daily. \par \par Using CPAP at night without O2, autoregulated. No CP, still with chronic dyspnea and fatigue. Stairs are a bit tough. MOre limited by knees. Patient denies PND/orthopnea/edema/palpitations/syncope/claudication. Limited by left knee pain.\par \par Works at [employer] nights part time. Daytime occasionally works as .  Helps with 5 grandchildren as well. \par \par ROS: GI and  negative

## 2023-06-21 NOTE — ASSESSMENT
[FreeTextEntry1] : ECG: SR, LAE, low voltage\par \par  HDL 45 LDL 49  (5/2023) \par  HDL 59 LDL 61 TG 79 (9/2021) \par  HDL 60 LDL 56 TG 63 (11/2020)\par  HDL 64 LDL 59 TG 67 (8/2019) \par  HDL 69 LDL 46 TG 61 (6/2018) \par \par CAROTID 9/2022:\par 1. 1-49% ICA stenosis bilaterally\par 2. Antegrade flow in the vertebral arteries bilaterally\par 3. No change from prior \par \par CAROTID 1/2021:\par 1. 1-49% ICA stenosis bilaterally\par 2. Antegrade flow in the vertebral arteries bilaterally\par \par ECHO 9/2022:\par 1. Mild LVH, EF 55-60%\par 2. Grade I diastolic dysfunction \par 3. Normal RV/LA/RA \par 4. Trivial AI\par 5. Borderline dilated aortic root/ascending aorta\par \par ECHO 1/2021:\par 1. Normal LV size, systolic and diastolic function. EF 60-65%\par 2. Borderline LAE\par 3. Normal RV/RA\par 4. Mild AI\par 5. Borderline dilated aorta at 3.9cm\par \par PHARM NUCLEAR STRESS TEST 1/2021\par 1. Negative for ischemia/infarct, EF 62%\par 2. BP hypertensive at baseline, normal response \par \par CAROTID DUPLEX 11/2019:\par 1. There is 1-49% stenosis of the left proximal ICA.\par 2. There is 1-49% stenosis of the right proximal ICA.\par 3. There is antegrade flow in the right and left vertebral arteries.\par 4. No change compared to 2018 study\par \par ECHO 11/2019:\par 1. Technically difficult study due to body habitus.\par 2. Normal LV size, systolic and diastolic function. EF 60-65%\par 3. Normal RV/LA/RA \par 4. There is borderline dilatation of the ascending aorta. Asc Ao 3.9cm\par \par \par PHARM NUCLEAR STRESS TSET 5/2018:\par 1. Normal Sestamibi myocardial perfusion SPECT imaging with artifact as noted above.\par 2. Left ventricular function was normal with an EF of 67%.\par 3. The EKG was negative for ischemia.\par 4. The blood pressure response was hypertensive baseline with normal response to Regadenason.\par 5. The patient developed occasional PVCs \par 6. When compared to prior study dated 2017, there is no longer evidence of ischemia.\par

## 2023-08-02 ENCOUNTER — RX RENEWAL (OUTPATIENT)
Age: 68
End: 2023-08-02

## 2023-08-23 ENCOUNTER — APPOINTMENT (OUTPATIENT)
Dept: PULMONOLOGY | Facility: CLINIC | Age: 68
End: 2023-08-23
Payer: MEDICARE

## 2023-08-23 VITALS
SYSTOLIC BLOOD PRESSURE: 130 MMHG | HEIGHT: 66 IN | HEART RATE: 57 BPM | DIASTOLIC BLOOD PRESSURE: 70 MMHG | OXYGEN SATURATION: 94 % | WEIGHT: 255 LBS | BODY MASS INDEX: 40.98 KG/M2

## 2023-08-23 DIAGNOSIS — Z01.811 ENCOUNTER FOR PREPROCEDURAL RESPIRATORY EXAMINATION: ICD-10-CM

## 2023-08-23 DIAGNOSIS — J44.9 CHRONIC OBSTRUCTIVE PULMONARY DISEASE, UNSPECIFIED: ICD-10-CM

## 2023-08-23 PROCEDURE — 99214 OFFICE O/P EST MOD 30 MIN: CPT

## 2023-08-23 NOTE — HISTORY OF PRESENT ILLNESS
[Obstructive Sleep Apnea] : obstructive sleep apnea [Home] : home [BPAP:] : BPAP [TextBox_100] : 2/17 [TextBox_108] : 115 [TextBox_112] : 97 [TextBox_116] : 77 [TextBox_135] : auto  pap max 20, epap min 8 ps5 [TextBox_127] : 7/23 [TextBox_129] : 8/23 [TextBox_133] : 100 [TextBox_137] : 100 [TextBox_141] : 7 [TextBox_143] : 24 [TextBox_147] : 2.1 [TextBox_165] : Alert on his new BiPAP.  Knee replacement on hold.  May be going for cataract surgery in the near future.  Mild COPD not on medications.

## 2023-08-23 NOTE — ASSESSMENT
[FreeTextEntry1] : Patient with severe obstructive sleep apnea doing well on BiPAP with excellent compliance and benefit.  This will be continued.  Mild COPD not requiring medications.  Patient cleared for cataract surgery.  He should bring his BiPAP to the surgery center for postoperative use as he recovers from sedation.  Follow-up will be in 1 year.

## 2023-09-06 ENCOUNTER — OFFICE (OUTPATIENT)
Dept: URBAN - METROPOLITAN AREA CLINIC 63 | Facility: CLINIC | Age: 68
Setting detail: OPHTHALMOLOGY
End: 2023-09-06
Payer: MEDICARE

## 2023-09-06 DIAGNOSIS — H18.513: ICD-10-CM

## 2023-09-06 DIAGNOSIS — H01.005: ICD-10-CM

## 2023-09-06 DIAGNOSIS — H02.831: ICD-10-CM

## 2023-09-06 DIAGNOSIS — H01.001: ICD-10-CM

## 2023-09-06 DIAGNOSIS — H25.12: ICD-10-CM

## 2023-09-06 DIAGNOSIS — H02.834: ICD-10-CM

## 2023-09-06 DIAGNOSIS — H01.002: ICD-10-CM

## 2023-09-06 DIAGNOSIS — H01.004: ICD-10-CM

## 2023-09-06 DIAGNOSIS — H25.13: ICD-10-CM

## 2023-09-06 DIAGNOSIS — H25.11: ICD-10-CM

## 2023-09-06 PROCEDURE — 92004 COMPRE OPH EXAM NEW PT 1/>: CPT | Performed by: OPHTHALMOLOGY

## 2023-09-06 ASSESSMENT — VISUAL ACUITY
OD_BCVA: 20/70
OS_BCVA: 20/50

## 2023-09-06 ASSESSMENT — KERATOMETRY
OS_AXISANGLE_DEGREES: 145
OD_AXISANGLE_DEGREES: 171
OS_K1POWER_DIOPTERS: 43.00
OD_K2POWER_DIOPTERS: 43.75
OS_K2POWER_DIOPTERS: 43.50
OD_K1POWER_DIOPTERS: 42.00

## 2023-09-06 ASSESSMENT — REFRACTION_MANIFEST
OS_ADD: +2.50
OS_CYLINDER: SPHERE
OD_CYLINDER: -2.00
OD_SPHERE: +2.50
OD_AXIS: 080
OD_VA1: 20/40
OS_SPHERE: +1.75
OS_VA1: 20/60
OD_ADD: +2.50

## 2023-09-06 ASSESSMENT — REFRACTION_AUTOREFRACTION
OS_AXIS: 095
OD_AXIS: 081
OD_CYLINDER: -2.50
OS_CYLINDER: -0.50
OD_SPHERE: +3.25
OS_SPHERE: +2.00

## 2023-09-06 ASSESSMENT — TONOMETRY
OS_IOP_MMHG: 12
OD_IOP_MMHG: 12

## 2023-09-06 ASSESSMENT — CORNEAL DYSTROPHY - POSTERIOR
OD_POSTERIORDYSTROPHY: 1+ GUTTATA
OS_POSTERIORDYSTROPHY: 1+ GUTTATA

## 2023-09-06 ASSESSMENT — LID POSITION - DERMATOCHALASIS
OS_DERMATOCHALASIS: LUL 1+
OD_DERMATOCHALASIS: RUL 1+

## 2023-09-06 ASSESSMENT — AXIALLENGTH_DERIVED
OD_AL: 23.2423
OS_AL: 23.0163
OD_AL: 23.0548

## 2023-09-06 ASSESSMENT — CONFRONTATIONAL VISUAL FIELD TEST (CVF)
OS_FINDINGS: FULL
OD_FINDINGS: FULL

## 2023-09-06 ASSESSMENT — LID EXAM ASSESSMENTS
OS_BLEPHARITIS: LLL LUL 2+
OD_BLEPHARITIS: RLL RUL 2+

## 2023-09-06 ASSESSMENT — SPHEQUIV_DERIVED
OS_SPHEQUIV: 1.75
OD_SPHEQUIV: 2
OD_SPHEQUIV: 1.5

## 2023-09-21 ASSESSMENT — KOOS JR
GOING UP OR DOWN STAIRS: SEVERE
KOOS JR RAW SCORE: 18
STRAIGHTENING KNEE FULLY: MODERATE
BENDING TO THE FLOOR TO PICK UP OBJECT: MODERATE
IMPORTED FORM: YES
TWISING OR PIVOTING ON KNEE: SEVERE
IMPORTED KOOS JR SCORE: 18.0
HOW SEVERE IS YOUR KNEE STIFFNESS AFTER FIRST WAKING IN MORNING: SEVERE
IMPORTED LATERALITY: RIGHT
STANDING UPRIGHT: MODERATE
RISING FROM SITTING: SEVERE

## 2023-09-22 ENCOUNTER — OFFICE (OUTPATIENT)
Dept: URBAN - METROPOLITAN AREA CLINIC 104 | Facility: CLINIC | Age: 68
Setting detail: OPHTHALMOLOGY
End: 2023-09-22
Payer: MEDICARE

## 2023-09-22 DIAGNOSIS — H25.13: ICD-10-CM

## 2023-09-22 DIAGNOSIS — H25.11: ICD-10-CM

## 2023-09-22 DIAGNOSIS — H18.523: ICD-10-CM

## 2023-09-22 DIAGNOSIS — H18.513: ICD-10-CM

## 2023-09-22 PROBLEM — H02.831 DERMATOCHALASIS; RIGHT UPPER LID, LEFT UPPER LID: Status: ACTIVE | Noted: 2023-09-06

## 2023-09-22 PROBLEM — H25.12 CATARACT SENILE NUCLEAR SCLEROSIS; RIGHT EYE, LEFT EYE, BOTH EYES: Status: ACTIVE | Noted: 2023-09-06

## 2023-09-22 PROBLEM — H01.002 BLEPHARITIS; RIGHT LOWER LID, LEFT UPPER LID, LEFT LOWER LID, RIGHT UPPER LID: Status: ACTIVE | Noted: 2023-09-06

## 2023-09-22 PROBLEM — H01.004 BLEPHARITIS; RIGHT LOWER LID, LEFT UPPER LID, LEFT LOWER LID, RIGHT UPPER LID: Status: ACTIVE | Noted: 2023-09-06

## 2023-09-22 PROBLEM — H01.001 BLEPHARITIS; RIGHT LOWER LID, LEFT UPPER LID, LEFT LOWER LID, RIGHT UPPER LID: Status: ACTIVE | Noted: 2023-09-06

## 2023-09-22 PROBLEM — H02.834 DERMATOCHALASIS; RIGHT UPPER LID, LEFT UPPER LID: Status: ACTIVE | Noted: 2023-09-06

## 2023-09-22 PROBLEM — H01.005 BLEPHARITIS; RIGHT LOWER LID, LEFT UPPER LID, LEFT LOWER LID, RIGHT UPPER LID: Status: ACTIVE | Noted: 2023-09-06

## 2023-09-22 PROCEDURE — 92136 OPHTHALMIC BIOMETRY: CPT | Performed by: OPHTHALMOLOGY

## 2023-09-22 PROCEDURE — 92025 CPTRIZED CORNEAL TOPOGRAPHY: CPT | Performed by: OPHTHALMOLOGY

## 2023-09-22 ASSESSMENT — AXIALLENGTH_DERIVED
OS_AL: 23.0163
OD_AL: 23.2423
OD_AL: 23.0548

## 2023-09-22 ASSESSMENT — REFRACTION_MANIFEST
OS_ADD: +2.50
OD_AXIS: 080
OD_ADD: +2.50
OD_CYLINDER: -2.00
OD_VA1: 20/40
OD_SPHERE: +2.50
OS_VA1: 20/60
OS_SPHERE: +1.75
OS_CYLINDER: SPHERE

## 2023-09-22 ASSESSMENT — REFRACTION_AUTOREFRACTION
OD_CYLINDER: -2.50
OS_AXIS: 095
OS_CYLINDER: -0.50
OS_SPHERE: +2.00
OD_AXIS: 081
OD_SPHERE: +3.25

## 2023-09-22 ASSESSMENT — VISUAL ACUITY
OD_BCVA: 20/70
OS_BCVA: 20/50

## 2023-09-22 ASSESSMENT — SPHEQUIV_DERIVED
OD_SPHEQUIV: 2
OD_SPHEQUIV: 1.5
OS_SPHEQUIV: 1.75

## 2023-09-22 ASSESSMENT — KERATOMETRY
OS_CYLPOWER_DEGREES: 0.5
OS_CYLAXISANGLE_DEGREES: 145
OS_AXISANGLE2_DEGREES: 145
OD_K1POWER_DIOPTERS: 42.00
OD_CYLAXISANGLE_DEGREES: 171
OD_AXISANGLE2_DEGREES: 171
OS_AXISANGLE_DEGREES: 55
OD_AXISANGLE_DEGREES: 171
OD_K2POWER_DIOPTERS: 43.75
OS_K2POWER_DIOPTERS: 43.50
OS_AXISANGLE_DEGREES: 145
OS_K1POWER_DIOPTERS: 43.00
OD_K2POWER_DIOPTERS: 43.75
OS_K1POWER_DIOPTERS: 43.00
OD_AXISANGLE_DEGREES: 81
OD_K1K2_AVERAGE: 42.875
OD_K1POWER_DIOPTERS: 42.00
OS_K2POWER_DIOPTERS: 43.50
OD_CYLPOWER_DEGREES: 1.75
OS_K1K2_AVERAGE: 43.25

## 2023-10-19 ENCOUNTER — RX ONLY (RX ONLY)
Age: 68
End: 2023-10-19

## 2023-10-19 ENCOUNTER — ASC (OUTPATIENT)
Dept: URBAN - METROPOLITAN AREA SURGERY 8 | Facility: SURGERY | Age: 68
Setting detail: OPHTHALMOLOGY
End: 2023-10-19
Payer: MEDICARE

## 2023-10-19 DIAGNOSIS — H25.11: ICD-10-CM

## 2023-10-19 DIAGNOSIS — H25.89: ICD-10-CM

## 2023-10-19 PROCEDURE — 66982 XCAPSL CTRC RMVL CPLX WO ECP: CPT | Mod: RT | Performed by: OPHTHALMOLOGY

## 2023-10-19 PROCEDURE — 68841 INSJ RX ELUT IMPLT LAC CANAL: CPT | Mod: RT | Performed by: OPHTHALMOLOGY

## 2023-10-20 ENCOUNTER — OFFICE (OUTPATIENT)
Dept: URBAN - METROPOLITAN AREA CLINIC 104 | Facility: CLINIC | Age: 68
Setting detail: OPHTHALMOLOGY
End: 2023-10-20
Payer: MEDICARE

## 2023-10-20 DIAGNOSIS — Z96.1: ICD-10-CM

## 2023-10-20 PROCEDURE — 99024 POSTOP FOLLOW-UP VISIT: CPT | Performed by: OPTOMETRIST

## 2023-10-20 ASSESSMENT — LID POSITION - DERMATOCHALASIS
OS_DERMATOCHALASIS: LUL 1+
OD_DERMATOCHALASIS: RUL 1+

## 2023-10-20 ASSESSMENT — LID EXAM ASSESSMENTS
OS_BLEPHARITIS: LLL LUL 2+
OD_BLEPHARITIS: RLL RUL 2+

## 2023-10-20 ASSESSMENT — VISUAL ACUITY
OS_BCVA: 20/60
OD_BCVA: 20/70

## 2023-10-20 ASSESSMENT — CONFRONTATIONAL VISUAL FIELD TEST (CVF)
OD_FINDINGS: FULL
OS_FINDINGS: FULL

## 2023-10-20 ASSESSMENT — CORNEAL DYSTROPHY - POSTERIOR
OD_POSTERIORDYSTROPHY: 1+ GUTTATA
OS_POSTERIORDYSTROPHY: 1+ GUTTATA

## 2023-10-20 ASSESSMENT — TONOMETRY: OD_IOP_MMHG: 19

## 2023-10-23 ENCOUNTER — OFFICE (OUTPATIENT)
Dept: URBAN - METROPOLITAN AREA CLINIC 104 | Facility: CLINIC | Age: 68
Setting detail: OPHTHALMOLOGY
End: 2023-10-23
Payer: MEDICARE

## 2023-10-23 DIAGNOSIS — Z96.1: ICD-10-CM

## 2023-10-23 DIAGNOSIS — H25.12: ICD-10-CM

## 2023-10-23 DIAGNOSIS — H25.812: ICD-10-CM

## 2023-10-23 PROCEDURE — 92136 OPHTHALMIC BIOMETRY: CPT | Mod: 26,LT | Performed by: OPHTHALMOLOGY

## 2023-10-23 PROCEDURE — 99024 POSTOP FOLLOW-UP VISIT: CPT | Performed by: OPHTHALMOLOGY

## 2023-10-23 ASSESSMENT — KERATOMETRY
OD_K1POWER_DIOPTERS: 42.03
OD_AXISANGLE_DEGREES: 159
OD_K2POWER_DIOPTERS: 44.06

## 2023-10-23 ASSESSMENT — VISUAL ACUITY
OD_BCVA: 20/
OS_BCVA: 20/40+2

## 2023-10-23 ASSESSMENT — REFRACTION_AUTOREFRACTION
OD_AXIS: 068
OD_SPHERE: +1.50
OD_CYLINDER: -2.50

## 2023-10-23 ASSESSMENT — CORNEAL DYSTROPHY - POSTERIOR
OS_POSTERIORDYSTROPHY: 1+ GUTTATA
OD_POSTERIORDYSTROPHY: 1+ GUTTATA

## 2023-10-23 ASSESSMENT — SPHEQUIV_DERIVED: OD_SPHEQUIV: 0.25

## 2023-10-23 ASSESSMENT — TONOMETRY: OD_IOP_MMHG: 16

## 2023-10-23 ASSESSMENT — LID EXAM ASSESSMENTS
OS_BLEPHARITIS: LLL LUL 2+
OD_BLEPHARITIS: RLL RUL 2+

## 2023-10-23 ASSESSMENT — CONFRONTATIONAL VISUAL FIELD TEST (CVF)
OD_FINDINGS: FULL
OS_FINDINGS: FULL

## 2023-10-23 ASSESSMENT — LID POSITION - DERMATOCHALASIS
OS_DERMATOCHALASIS: LUL 1+
OD_DERMATOCHALASIS: RUL 1+

## 2023-10-23 ASSESSMENT — AXIALLENGTH_DERIVED: OD_AL: 23.6618

## 2023-10-26 ENCOUNTER — ASC (OUTPATIENT)
Dept: URBAN - METROPOLITAN AREA SURGERY 8 | Facility: SURGERY | Age: 68
Setting detail: OPHTHALMOLOGY
End: 2023-10-26
Payer: MEDICARE

## 2023-10-26 ENCOUNTER — RX ONLY (RX ONLY)
Age: 68
End: 2023-10-26

## 2023-10-26 DIAGNOSIS — H25.89: ICD-10-CM

## 2023-10-26 DIAGNOSIS — H25.12: ICD-10-CM

## 2023-10-26 PROCEDURE — 66982 XCAPSL CTRC RMVL CPLX WO ECP: CPT | Mod: 79,LT | Performed by: OPHTHALMOLOGY

## 2023-10-26 PROCEDURE — 68841 INSJ RX ELUT IMPLT LAC CANAL: CPT | Mod: 79,LT | Performed by: OPHTHALMOLOGY

## 2023-10-27 ENCOUNTER — OFFICE (OUTPATIENT)
Dept: URBAN - METROPOLITAN AREA CLINIC 104 | Facility: CLINIC | Age: 68
Setting detail: OPHTHALMOLOGY
End: 2023-10-27
Payer: MEDICARE

## 2023-10-27 DIAGNOSIS — Z96.1: ICD-10-CM

## 2023-10-27 PROBLEM — H25.812 CATARACT SENILE NUCLEAR SCLEROSIS; LEFT EYE: Status: RESOLVED | Noted: 2023-10-20 | Resolved: 2023-10-27

## 2023-10-27 PROBLEM — H25.12 CATARACT SENILE NUCLEAR SCLEROSIS; LEFT EYE: Status: RESOLVED | Noted: 2023-10-20 | Resolved: 2023-10-27

## 2023-10-27 PROCEDURE — 99024 POSTOP FOLLOW-UP VISIT: CPT | Performed by: OPTOMETRIST

## 2023-10-27 ASSESSMENT — CONFRONTATIONAL VISUAL FIELD TEST (CVF)
OD_FINDINGS: FULL
OS_FINDINGS: FULL

## 2023-10-27 ASSESSMENT — LID EXAM ASSESSMENTS
OS_BLEPHARITIS: LLL LUL 2+
OD_BLEPHARITIS: RLL RUL 2+

## 2023-10-27 ASSESSMENT — LID POSITION - DERMATOCHALASIS
OS_DERMATOCHALASIS: LUL 1+
OD_DERMATOCHALASIS: RUL 1+

## 2023-10-27 ASSESSMENT — VISUAL ACUITY
OD_BCVA: 20/30
OS_BCVA: 20/40

## 2023-10-27 ASSESSMENT — TONOMETRY: OS_IOP_MMHG: 18

## 2023-10-27 ASSESSMENT — CORNEAL DYSTROPHY - POSTERIOR
OD_POSTERIORDYSTROPHY: 1+ GUTTATA
OS_POSTERIORDYSTROPHY: 1+ GUTTATA

## 2023-11-02 ENCOUNTER — OFFICE (OUTPATIENT)
Dept: URBAN - METROPOLITAN AREA CLINIC 104 | Facility: CLINIC | Age: 68
Setting detail: OPHTHALMOLOGY
End: 2023-11-02
Payer: MEDICARE

## 2023-11-02 DIAGNOSIS — Z96.1: ICD-10-CM

## 2023-11-02 PROCEDURE — 99024 POSTOP FOLLOW-UP VISIT: CPT | Performed by: OPHTHALMOLOGY

## 2023-11-02 ASSESSMENT — REFRACTION_AUTOREFRACTION
OS_SPHERE: +1.00
OD_CYLINDER: -1.25
OD_SPHERE: +1.00
OS_AXIS: 076
OS_CYLINDER: -1.25
OD_AXIS: 071

## 2023-11-02 ASSESSMENT — SPHEQUIV_DERIVED
OS_SPHEQUIV: 0.375
OD_SPHEQUIV: 0.375

## 2023-11-02 ASSESSMENT — LID POSITION - DERMATOCHALASIS
OS_DERMATOCHALASIS: LUL 1+
OD_DERMATOCHALASIS: RUL 1+

## 2023-11-02 ASSESSMENT — CONFRONTATIONAL VISUAL FIELD TEST (CVF)
OS_FINDINGS: FULL
OD_FINDINGS: FULL

## 2023-11-02 ASSESSMENT — CORNEAL DYSTROPHY - POSTERIOR
OD_POSTERIORDYSTROPHY: 1+ GUTTATA
OS_POSTERIORDYSTROPHY: 1+ GUTTATA

## 2023-11-02 ASSESSMENT — LID EXAM ASSESSMENTS
OS_BLEPHARITIS: LLL LUL 2+
OD_BLEPHARITIS: RLL RUL 2+

## 2023-11-30 ENCOUNTER — OFFICE (OUTPATIENT)
Dept: URBAN - METROPOLITAN AREA CLINIC 104 | Facility: CLINIC | Age: 68
Setting detail: OPHTHALMOLOGY
End: 2023-11-30
Payer: MEDICARE

## 2023-11-30 DIAGNOSIS — H43.391: ICD-10-CM

## 2023-11-30 DIAGNOSIS — Z96.1: ICD-10-CM

## 2023-11-30 PROCEDURE — 99024 POSTOP FOLLOW-UP VISIT: CPT | Performed by: OPTOMETRIST

## 2023-11-30 ASSESSMENT — REFRACTION_MANIFEST
OS_AXIS: 055
OS_ADD: +2.75
OD_VA1: 20/30
OD_SPHERE: +0.75
OS_SPHERE: PLANO
OD_AXIS: 070
OD_CYLINDER: -1.50
OS_VA1: 20/25
OS_CYLINDER: -0.50
OD_ADD: +2.75

## 2023-11-30 ASSESSMENT — CONFRONTATIONAL VISUAL FIELD TEST (CVF)
OS_FINDINGS: FULL
OD_FINDINGS: FULL

## 2023-11-30 ASSESSMENT — REFRACTION_AUTOREFRACTION
OD_SPHERE: +1.00
OD_CYLINDER: -1.75
OS_SPHERE: +1.00
OS_CYLINDER: -1.25
OS_AXIS: 055
OD_AXIS: 068

## 2023-11-30 ASSESSMENT — CORNEAL DYSTROPHY - POSTERIOR
OD_POSTERIORDYSTROPHY: 1+ GUTTATA
OS_POSTERIORDYSTROPHY: 1+ GUTTATA

## 2023-11-30 ASSESSMENT — SPHEQUIV_DERIVED
OS_SPHEQUIV: 0.375
OD_SPHEQUIV: 0
OD_SPHEQUIV: 0.125

## 2023-11-30 ASSESSMENT — LID POSITION - DERMATOCHALASIS
OD_DERMATOCHALASIS: RUL 1+
OS_DERMATOCHALASIS: LUL 1+

## 2023-11-30 ASSESSMENT — LID EXAM ASSESSMENTS
OD_BLEPHARITIS: RLL RUL 2+
OS_BLEPHARITIS: LLL LUL 2+

## 2023-12-08 ENCOUNTER — NON-APPOINTMENT (OUTPATIENT)
Age: 68
End: 2023-12-08

## 2023-12-08 ENCOUNTER — APPOINTMENT (OUTPATIENT)
Dept: CARDIOLOGY | Facility: CLINIC | Age: 68
End: 2023-12-08
Payer: MEDICARE

## 2023-12-08 VITALS
SYSTOLIC BLOOD PRESSURE: 130 MMHG | WEIGHT: 251 LBS | HEART RATE: 56 BPM | RESPIRATION RATE: 16 BRPM | BODY MASS INDEX: 40.34 KG/M2 | OXYGEN SATURATION: 94 % | DIASTOLIC BLOOD PRESSURE: 82 MMHG | HEIGHT: 66 IN

## 2023-12-08 PROCEDURE — 99214 OFFICE O/P EST MOD 30 MIN: CPT

## 2023-12-08 PROCEDURE — 93000 ELECTROCARDIOGRAM COMPLETE: CPT

## 2023-12-27 ENCOUNTER — APPOINTMENT (OUTPATIENT)
Dept: PULMONOLOGY | Facility: CLINIC | Age: 68
End: 2023-12-27
Payer: MEDICARE

## 2023-12-27 ENCOUNTER — INPATIENT (INPATIENT)
Facility: HOSPITAL | Age: 68
LOS: 2 days | Discharge: HOME | DRG: 193 | End: 2023-12-30
Attending: HOSPITALIST | Admitting: HOSPITALIST
Payer: MEDICARE

## 2023-12-27 VITALS
RESPIRATION RATE: 16 BRPM | SYSTOLIC BLOOD PRESSURE: 144 MMHG | HEART RATE: 67 BPM | OXYGEN SATURATION: 90 % | DIASTOLIC BLOOD PRESSURE: 82 MMHG

## 2023-12-27 VITALS
RESPIRATION RATE: 22 BRPM | OXYGEN SATURATION: 96 % | DIASTOLIC BLOOD PRESSURE: 77 MMHG | HEIGHT: 66 IN | WEIGHT: 250 LBS | HEART RATE: 84 BPM | SYSTOLIC BLOOD PRESSURE: 124 MMHG | TEMPERATURE: 98 F

## 2023-12-27 DIAGNOSIS — J96.01 ACUTE RESPIRATORY FAILURE WITH HYPOXIA: ICD-10-CM

## 2023-12-27 DIAGNOSIS — Z98.890 OTHER SPECIFIED POSTPROCEDURAL STATES: Chronic | ICD-10-CM

## 2023-12-27 DIAGNOSIS — R05.9 COUGH, UNSPECIFIED: ICD-10-CM

## 2023-12-27 LAB
ALBUMIN SERPL ELPH-MCNC: 4.1 G/DL — SIGNIFICANT CHANGE UP (ref 3.3–5.2)
ALBUMIN SERPL ELPH-MCNC: 4.1 G/DL — SIGNIFICANT CHANGE UP (ref 3.3–5.2)
ALP SERPL-CCNC: 113 U/L — SIGNIFICANT CHANGE UP (ref 40–120)
ALP SERPL-CCNC: 113 U/L — SIGNIFICANT CHANGE UP (ref 40–120)
ALT FLD-CCNC: 35 U/L — SIGNIFICANT CHANGE UP
ALT FLD-CCNC: 35 U/L — SIGNIFICANT CHANGE UP
ANION GAP SERPL CALC-SCNC: 14 MMOL/L — SIGNIFICANT CHANGE UP (ref 5–17)
ANION GAP SERPL CALC-SCNC: 14 MMOL/L — SIGNIFICANT CHANGE UP (ref 5–17)
APPEARANCE UR: CLEAR — SIGNIFICANT CHANGE UP
APPEARANCE UR: CLEAR — SIGNIFICANT CHANGE UP
APTT BLD: 30.5 SEC — SIGNIFICANT CHANGE UP (ref 24.5–35.6)
APTT BLD: 30.5 SEC — SIGNIFICANT CHANGE UP (ref 24.5–35.6)
AST SERPL-CCNC: 31 U/L — SIGNIFICANT CHANGE UP
AST SERPL-CCNC: 31 U/L — SIGNIFICANT CHANGE UP
BACTERIA # UR AUTO: NEGATIVE /HPF — SIGNIFICANT CHANGE UP
BACTERIA # UR AUTO: NEGATIVE /HPF — SIGNIFICANT CHANGE UP
BASE EXCESS BLDV CALC-SCNC: 2 MMOL/L — SIGNIFICANT CHANGE UP (ref -2–3)
BASE EXCESS BLDV CALC-SCNC: 2 MMOL/L — SIGNIFICANT CHANGE UP (ref -2–3)
BASOPHILS # BLD AUTO: 0.03 K/UL — SIGNIFICANT CHANGE UP (ref 0–0.2)
BASOPHILS # BLD AUTO: 0.03 K/UL — SIGNIFICANT CHANGE UP (ref 0–0.2)
BASOPHILS NFR BLD AUTO: 0.2 % — SIGNIFICANT CHANGE UP (ref 0–2)
BASOPHILS NFR BLD AUTO: 0.2 % — SIGNIFICANT CHANGE UP (ref 0–2)
BILIRUB SERPL-MCNC: 0.5 MG/DL — SIGNIFICANT CHANGE UP (ref 0.4–2)
BILIRUB SERPL-MCNC: 0.5 MG/DL — SIGNIFICANT CHANGE UP (ref 0.4–2)
BILIRUB UR-MCNC: NEGATIVE — SIGNIFICANT CHANGE UP
BILIRUB UR-MCNC: NEGATIVE — SIGNIFICANT CHANGE UP
BUN SERPL-MCNC: 21 MG/DL — HIGH (ref 8–20)
BUN SERPL-MCNC: 21 MG/DL — HIGH (ref 8–20)
CA-I SERPL-SCNC: 1.16 MMOL/L — SIGNIFICANT CHANGE UP (ref 1.15–1.33)
CA-I SERPL-SCNC: 1.16 MMOL/L — SIGNIFICANT CHANGE UP (ref 1.15–1.33)
CALCIUM SERPL-MCNC: 9 MG/DL — SIGNIFICANT CHANGE UP (ref 8.4–10.5)
CALCIUM SERPL-MCNC: 9 MG/DL — SIGNIFICANT CHANGE UP (ref 8.4–10.5)
CAST: 1 /LPF — SIGNIFICANT CHANGE UP (ref 0–4)
CAST: 1 /LPF — SIGNIFICANT CHANGE UP (ref 0–4)
CHLORIDE BLDV-SCNC: 95 MMOL/L — LOW (ref 96–108)
CHLORIDE BLDV-SCNC: 95 MMOL/L — LOW (ref 96–108)
CHLORIDE SERPL-SCNC: 95 MMOL/L — LOW (ref 96–108)
CHLORIDE SERPL-SCNC: 95 MMOL/L — LOW (ref 96–108)
CO2 SERPL-SCNC: 25 MMOL/L — SIGNIFICANT CHANGE UP (ref 22–29)
CO2 SERPL-SCNC: 25 MMOL/L — SIGNIFICANT CHANGE UP (ref 22–29)
COLOR SPEC: YELLOW — SIGNIFICANT CHANGE UP
COLOR SPEC: YELLOW — SIGNIFICANT CHANGE UP
CREAT SERPL-MCNC: 1.19 MG/DL — SIGNIFICANT CHANGE UP (ref 0.5–1.3)
CREAT SERPL-MCNC: 1.19 MG/DL — SIGNIFICANT CHANGE UP (ref 0.5–1.3)
DIFF PNL FLD: NEGATIVE — SIGNIFICANT CHANGE UP
DIFF PNL FLD: NEGATIVE — SIGNIFICANT CHANGE UP
EGFR: 66 ML/MIN/1.73M2 — SIGNIFICANT CHANGE UP
EGFR: 66 ML/MIN/1.73M2 — SIGNIFICANT CHANGE UP
EOSINOPHIL # BLD AUTO: 0.02 K/UL — SIGNIFICANT CHANGE UP (ref 0–0.5)
EOSINOPHIL # BLD AUTO: 0.02 K/UL — SIGNIFICANT CHANGE UP (ref 0–0.5)
EOSINOPHIL NFR BLD AUTO: 0.1 % — SIGNIFICANT CHANGE UP (ref 0–6)
EOSINOPHIL NFR BLD AUTO: 0.1 % — SIGNIFICANT CHANGE UP (ref 0–6)
GAS PNL BLDV: 130 MMOL/L — LOW (ref 136–145)
GAS PNL BLDV: 130 MMOL/L — LOW (ref 136–145)
GAS PNL BLDV: SIGNIFICANT CHANGE UP
GAS PNL BLDV: SIGNIFICANT CHANGE UP
GLUCOSE BLDC GLUCOMTR-MCNC: 193 MG/DL — HIGH (ref 70–99)
GLUCOSE BLDC GLUCOMTR-MCNC: 193 MG/DL — HIGH (ref 70–99)
GLUCOSE BLDV-MCNC: 118 MG/DL — HIGH (ref 70–99)
GLUCOSE BLDV-MCNC: 118 MG/DL — HIGH (ref 70–99)
GLUCOSE SERPL-MCNC: 109 MG/DL — HIGH (ref 70–99)
GLUCOSE SERPL-MCNC: 109 MG/DL — HIGH (ref 70–99)
GLUCOSE UR QL: NEGATIVE MG/DL — SIGNIFICANT CHANGE UP
GLUCOSE UR QL: NEGATIVE MG/DL — SIGNIFICANT CHANGE UP
HCO3 BLDV-SCNC: 28 MMOL/L — SIGNIFICANT CHANGE UP (ref 22–29)
HCO3 BLDV-SCNC: 28 MMOL/L — SIGNIFICANT CHANGE UP (ref 22–29)
HCT VFR BLD CALC: 42.1 % — SIGNIFICANT CHANGE UP (ref 39–50)
HCT VFR BLD CALC: 42.1 % — SIGNIFICANT CHANGE UP (ref 39–50)
HCT VFR BLDA CALC: 42 % — SIGNIFICANT CHANGE UP
HCT VFR BLDA CALC: 42 % — SIGNIFICANT CHANGE UP
HGB BLD CALC-MCNC: 14 G/DL — SIGNIFICANT CHANGE UP (ref 12.6–17.4)
HGB BLD CALC-MCNC: 14 G/DL — SIGNIFICANT CHANGE UP (ref 12.6–17.4)
HGB BLD-MCNC: 14.2 G/DL — SIGNIFICANT CHANGE UP (ref 13–17)
HGB BLD-MCNC: 14.2 G/DL — SIGNIFICANT CHANGE UP (ref 13–17)
IMM GRANULOCYTES NFR BLD AUTO: 0.9 % — SIGNIFICANT CHANGE UP (ref 0–0.9)
IMM GRANULOCYTES NFR BLD AUTO: 0.9 % — SIGNIFICANT CHANGE UP (ref 0–0.9)
INR BLD: 0.98 RATIO — SIGNIFICANT CHANGE UP (ref 0.85–1.18)
INR BLD: 0.98 RATIO — SIGNIFICANT CHANGE UP (ref 0.85–1.18)
KETONES UR-MCNC: NEGATIVE MG/DL — SIGNIFICANT CHANGE UP
KETONES UR-MCNC: NEGATIVE MG/DL — SIGNIFICANT CHANGE UP
LACTATE BLDV-MCNC: 1.6 MMOL/L — SIGNIFICANT CHANGE UP (ref 0.5–2)
LACTATE BLDV-MCNC: 1.6 MMOL/L — SIGNIFICANT CHANGE UP (ref 0.5–2)
LEUKOCYTE ESTERASE UR-ACNC: NEGATIVE — SIGNIFICANT CHANGE UP
LEUKOCYTE ESTERASE UR-ACNC: NEGATIVE — SIGNIFICANT CHANGE UP
LYMPHOCYTES # BLD AUTO: 0.65 K/UL — LOW (ref 1–3.3)
LYMPHOCYTES # BLD AUTO: 0.65 K/UL — LOW (ref 1–3.3)
LYMPHOCYTES # BLD AUTO: 3.4 % — LOW (ref 13–44)
LYMPHOCYTES # BLD AUTO: 3.4 % — LOW (ref 13–44)
MCHC RBC-ENTMCNC: 29.2 PG — SIGNIFICANT CHANGE UP (ref 27–34)
MCHC RBC-ENTMCNC: 29.2 PG — SIGNIFICANT CHANGE UP (ref 27–34)
MCHC RBC-ENTMCNC: 33.7 GM/DL — SIGNIFICANT CHANGE UP (ref 32–36)
MCHC RBC-ENTMCNC: 33.7 GM/DL — SIGNIFICANT CHANGE UP (ref 32–36)
MCV RBC AUTO: 86.4 FL — SIGNIFICANT CHANGE UP (ref 80–100)
MCV RBC AUTO: 86.4 FL — SIGNIFICANT CHANGE UP (ref 80–100)
MONOCYTES # BLD AUTO: 1.06 K/UL — HIGH (ref 0–0.9)
MONOCYTES # BLD AUTO: 1.06 K/UL — HIGH (ref 0–0.9)
MONOCYTES NFR BLD AUTO: 5.5 % — SIGNIFICANT CHANGE UP (ref 2–14)
MONOCYTES NFR BLD AUTO: 5.5 % — SIGNIFICANT CHANGE UP (ref 2–14)
NEUTROPHILS # BLD AUTO: 17.21 K/UL — HIGH (ref 1.8–7.4)
NEUTROPHILS # BLD AUTO: 17.21 K/UL — HIGH (ref 1.8–7.4)
NEUTROPHILS NFR BLD AUTO: 89.9 % — HIGH (ref 43–77)
NEUTROPHILS NFR BLD AUTO: 89.9 % — HIGH (ref 43–77)
NITRITE UR-MCNC: NEGATIVE — SIGNIFICANT CHANGE UP
NITRITE UR-MCNC: NEGATIVE — SIGNIFICANT CHANGE UP
OSMOLALITY SERPL: 286 MOSMOL/KG — SIGNIFICANT CHANGE UP (ref 280–301)
OSMOLALITY SERPL: 286 MOSMOL/KG — SIGNIFICANT CHANGE UP (ref 280–301)
OSMOLALITY UR: 336 MOSM/KG — SIGNIFICANT CHANGE UP (ref 300–1000)
OSMOLALITY UR: 336 MOSM/KG — SIGNIFICANT CHANGE UP (ref 300–1000)
PCO2 BLDV: 50 MMHG — SIGNIFICANT CHANGE UP (ref 42–55)
PCO2 BLDV: 50 MMHG — SIGNIFICANT CHANGE UP (ref 42–55)
PH BLDV: 7.35 — SIGNIFICANT CHANGE UP (ref 7.32–7.43)
PH BLDV: 7.35 — SIGNIFICANT CHANGE UP (ref 7.32–7.43)
PH UR: 6 — SIGNIFICANT CHANGE UP (ref 5–8)
PH UR: 6 — SIGNIFICANT CHANGE UP (ref 5–8)
PLATELET # BLD AUTO: 222 K/UL — SIGNIFICANT CHANGE UP (ref 150–400)
PLATELET # BLD AUTO: 222 K/UL — SIGNIFICANT CHANGE UP (ref 150–400)
PO2 BLDV: 62 MMHG — HIGH (ref 25–45)
PO2 BLDV: 62 MMHG — HIGH (ref 25–45)
POTASSIUM BLDV-SCNC: 4.2 MMOL/L — SIGNIFICANT CHANGE UP (ref 3.5–5.1)
POTASSIUM BLDV-SCNC: 4.2 MMOL/L — SIGNIFICANT CHANGE UP (ref 3.5–5.1)
POTASSIUM SERPL-MCNC: 4.1 MMOL/L — SIGNIFICANT CHANGE UP (ref 3.5–5.3)
POTASSIUM SERPL-MCNC: 4.1 MMOL/L — SIGNIFICANT CHANGE UP (ref 3.5–5.3)
POTASSIUM SERPL-SCNC: 4.1 MMOL/L — SIGNIFICANT CHANGE UP (ref 3.5–5.3)
POTASSIUM SERPL-SCNC: 4.1 MMOL/L — SIGNIFICANT CHANGE UP (ref 3.5–5.3)
PROT SERPL-MCNC: 6.7 G/DL — SIGNIFICANT CHANGE UP (ref 6.6–8.7)
PROT SERPL-MCNC: 6.7 G/DL — SIGNIFICANT CHANGE UP (ref 6.6–8.7)
PROT UR-MCNC: NEGATIVE MG/DL — SIGNIFICANT CHANGE UP
PROT UR-MCNC: NEGATIVE MG/DL — SIGNIFICANT CHANGE UP
PROTHROM AB SERPL-ACNC: 10.9 SEC — SIGNIFICANT CHANGE UP (ref 9.5–13)
PROTHROM AB SERPL-ACNC: 10.9 SEC — SIGNIFICANT CHANGE UP (ref 9.5–13)
RAPID RVP RESULT: SIGNIFICANT CHANGE UP
RAPID RVP RESULT: SIGNIFICANT CHANGE UP
RBC # BLD: 4.87 M/UL — SIGNIFICANT CHANGE UP (ref 4.2–5.8)
RBC # BLD: 4.87 M/UL — SIGNIFICANT CHANGE UP (ref 4.2–5.8)
RBC # FLD: 13.4 % — SIGNIFICANT CHANGE UP (ref 10.3–14.5)
RBC # FLD: 13.4 % — SIGNIFICANT CHANGE UP (ref 10.3–14.5)
RBC CASTS # UR COMP ASSIST: 0 /HPF — SIGNIFICANT CHANGE UP (ref 0–4)
RBC CASTS # UR COMP ASSIST: 0 /HPF — SIGNIFICANT CHANGE UP (ref 0–4)
SAO2 % BLDV: 89.9 % — SIGNIFICANT CHANGE UP
SAO2 % BLDV: 89.9 % — SIGNIFICANT CHANGE UP
SARS-COV-2 RNA SPEC QL NAA+PROBE: SIGNIFICANT CHANGE UP
SARS-COV-2 RNA SPEC QL NAA+PROBE: SIGNIFICANT CHANGE UP
SODIUM SERPL-SCNC: 134 MMOL/L — LOW (ref 135–145)
SODIUM SERPL-SCNC: 134 MMOL/L — LOW (ref 135–145)
SODIUM UR-SCNC: <30 MMOL/L — SIGNIFICANT CHANGE UP
SODIUM UR-SCNC: <30 MMOL/L — SIGNIFICANT CHANGE UP
SP GR SPEC: 1.01 — SIGNIFICANT CHANGE UP (ref 1–1.03)
SP GR SPEC: 1.01 — SIGNIFICANT CHANGE UP (ref 1–1.03)
SQUAMOUS # UR AUTO: 0 /HPF — SIGNIFICANT CHANGE UP (ref 0–5)
SQUAMOUS # UR AUTO: 0 /HPF — SIGNIFICANT CHANGE UP (ref 0–5)
TROPONIN T, HIGH SENSITIVITY RESULT: 45 NG/L — SIGNIFICANT CHANGE UP (ref 0–51)
TROPONIN T, HIGH SENSITIVITY RESULT: 45 NG/L — SIGNIFICANT CHANGE UP (ref 0–51)
TROPONIN T, HIGH SENSITIVITY RESULT: 53 NG/L — HIGH (ref 0–51)
TROPONIN T, HIGH SENSITIVITY RESULT: 53 NG/L — HIGH (ref 0–51)
UROBILINOGEN FLD QL: 0.2 MG/DL — SIGNIFICANT CHANGE UP (ref 0.2–1)
UROBILINOGEN FLD QL: 0.2 MG/DL — SIGNIFICANT CHANGE UP (ref 0.2–1)
WBC # BLD: 19.14 K/UL — HIGH (ref 3.8–10.5)
WBC # BLD: 19.14 K/UL — HIGH (ref 3.8–10.5)
WBC # FLD AUTO: 19.14 K/UL — HIGH (ref 3.8–10.5)
WBC # FLD AUTO: 19.14 K/UL — HIGH (ref 3.8–10.5)
WBC UR QL: 1 /HPF — SIGNIFICANT CHANGE UP (ref 0–5)
WBC UR QL: 1 /HPF — SIGNIFICANT CHANGE UP (ref 0–5)

## 2023-12-27 PROCEDURE — 93010 ELECTROCARDIOGRAM REPORT: CPT

## 2023-12-27 PROCEDURE — 99213 OFFICE O/P EST LOW 20 MIN: CPT

## 2023-12-27 PROCEDURE — 71045 X-RAY EXAM CHEST 1 VIEW: CPT | Mod: 26

## 2023-12-27 PROCEDURE — 99285 EMERGENCY DEPT VISIT HI MDM: CPT

## 2023-12-27 PROCEDURE — 99223 1ST HOSP IP/OBS HIGH 75: CPT | Mod: AI

## 2023-12-27 RX ORDER — VALSARTAN 80 MG/1
1 TABLET ORAL
Qty: 0 | Refills: 0 | DISCHARGE

## 2023-12-27 RX ORDER — ATORVASTATIN CALCIUM 80 MG/1
40 TABLET, FILM COATED ORAL AT BEDTIME
Refills: 0 | Status: DISCONTINUED | OUTPATIENT
Start: 2023-12-27 | End: 2023-12-30

## 2023-12-27 RX ORDER — ONDANSETRON 8 MG/1
4 TABLET, FILM COATED ORAL EVERY 8 HOURS
Refills: 0 | Status: DISCONTINUED | OUTPATIENT
Start: 2023-12-27 | End: 2023-12-30

## 2023-12-27 RX ORDER — AZITHROMYCIN 500 MG/1
500 TABLET, FILM COATED ORAL ONCE
Refills: 0 | Status: COMPLETED | OUTPATIENT
Start: 2023-12-27 | End: 2023-12-27

## 2023-12-27 RX ORDER — ENOXAPARIN SODIUM 100 MG/ML
40 INJECTION SUBCUTANEOUS EVERY 24 HOURS
Refills: 0 | Status: DISCONTINUED | OUTPATIENT
Start: 2023-12-27 | End: 2023-12-30

## 2023-12-27 RX ORDER — ATORVASTATIN CALCIUM 80 MG/1
1 TABLET, FILM COATED ORAL
Qty: 0 | Refills: 0 | DISCHARGE

## 2023-12-27 RX ORDER — CEFTRIAXONE 500 MG/1
1000 INJECTION, POWDER, FOR SOLUTION INTRAMUSCULAR; INTRAVENOUS EVERY 24 HOURS
Refills: 0 | Status: DISCONTINUED | OUTPATIENT
Start: 2023-12-28 | End: 2023-12-30

## 2023-12-27 RX ORDER — LANOLIN ALCOHOL/MO/W.PET/CERES
3 CREAM (GRAM) TOPICAL AT BEDTIME
Refills: 0 | Status: DISCONTINUED | OUTPATIENT
Start: 2023-12-27 | End: 2023-12-30

## 2023-12-27 RX ORDER — VALSARTAN 80 MG/1
160 TABLET ORAL DAILY
Refills: 0 | Status: DISCONTINUED | OUTPATIENT
Start: 2023-12-27 | End: 2023-12-30

## 2023-12-27 RX ORDER — AZITHROMYCIN 500 MG/1
500 TABLET, FILM COATED ORAL EVERY 24 HOURS
Refills: 0 | Status: DISCONTINUED | OUTPATIENT
Start: 2023-12-28 | End: 2023-12-30

## 2023-12-27 RX ORDER — ACETAMINOPHEN 500 MG
650 TABLET ORAL EVERY 6 HOURS
Refills: 0 | Status: DISCONTINUED | OUTPATIENT
Start: 2023-12-27 | End: 2023-12-30

## 2023-12-27 RX ORDER — DEXTROSE 50 % IN WATER 50 %
25 SYRINGE (ML) INTRAVENOUS ONCE
Refills: 0 | Status: DISCONTINUED | OUTPATIENT
Start: 2023-12-27 | End: 2023-12-30

## 2023-12-27 RX ORDER — ALBUTEROL SULFATE 90 UG/1
108 (90 BASE) INHALANT RESPIRATORY (INHALATION)
Qty: 1 | Refills: 5 | Status: ACTIVE | COMMUNITY
Start: 2023-12-27 | End: 1900-01-01

## 2023-12-27 RX ORDER — SODIUM CHLORIDE 9 MG/ML
1000 INJECTION, SOLUTION INTRAVENOUS
Refills: 0 | Status: DISCONTINUED | OUTPATIENT
Start: 2023-12-27 | End: 2023-12-30

## 2023-12-27 RX ORDER — AZITHROMYCIN 500 MG/1
TABLET, FILM COATED ORAL
Refills: 0 | Status: DISCONTINUED | OUTPATIENT
Start: 2023-12-27 | End: 2023-12-30

## 2023-12-27 RX ORDER — IPRATROPIUM/ALBUTEROL SULFATE 18-103MCG
3 AEROSOL WITH ADAPTER (GRAM) INHALATION
Refills: 0 | Status: COMPLETED | OUTPATIENT
Start: 2023-12-27 | End: 2023-12-27

## 2023-12-27 RX ORDER — INSULIN LISPRO 100/ML
VIAL (ML) SUBCUTANEOUS
Refills: 0 | Status: DISCONTINUED | OUTPATIENT
Start: 2023-12-27 | End: 2023-12-30

## 2023-12-27 RX ORDER — SERTRALINE 25 MG/1
1 TABLET, FILM COATED ORAL
Qty: 0 | Refills: 0 | DISCHARGE

## 2023-12-27 RX ORDER — DEXTROSE 50 % IN WATER 50 %
12.5 SYRINGE (ML) INTRAVENOUS ONCE
Refills: 0 | Status: DISCONTINUED | OUTPATIENT
Start: 2023-12-27 | End: 2023-12-30

## 2023-12-27 RX ORDER — GLUCAGON INJECTION, SOLUTION 0.5 MG/.1ML
1 INJECTION, SOLUTION SUBCUTANEOUS ONCE
Refills: 0 | Status: DISCONTINUED | OUTPATIENT
Start: 2023-12-27 | End: 2023-12-30

## 2023-12-27 RX ORDER — MAGNESIUM SULFATE 500 MG/ML
2 VIAL (ML) INJECTION ONCE
Refills: 0 | Status: COMPLETED | OUTPATIENT
Start: 2023-12-27 | End: 2023-12-27

## 2023-12-27 RX ORDER — INSULIN LISPRO 100/ML
VIAL (ML) SUBCUTANEOUS AT BEDTIME
Refills: 0 | Status: DISCONTINUED | OUTPATIENT
Start: 2023-12-27 | End: 2023-12-30

## 2023-12-27 RX ORDER — GUAIFENESIN/DEXTROMETHORPHAN 600MG-30MG
10 TABLET, EXTENDED RELEASE 12 HR ORAL EVERY 4 HOURS
Refills: 0 | Status: DISCONTINUED | OUTPATIENT
Start: 2023-12-27 | End: 2023-12-30

## 2023-12-27 RX ORDER — TIOTROPIUM BROMIDE 18 UG/1
2 CAPSULE ORAL; RESPIRATORY (INHALATION) DAILY
Refills: 0 | Status: DISCONTINUED | OUTPATIENT
Start: 2023-12-27 | End: 2023-12-30

## 2023-12-27 RX ORDER — SERTRALINE 25 MG/1
100 TABLET, FILM COATED ORAL DAILY
Refills: 0 | Status: DISCONTINUED | OUTPATIENT
Start: 2023-12-27 | End: 2023-12-30

## 2023-12-27 RX ORDER — ASPIRIN/CALCIUM CARB/MAGNESIUM 324 MG
81 TABLET ORAL DAILY
Refills: 0 | Status: DISCONTINUED | OUTPATIENT
Start: 2023-12-27 | End: 2023-12-30

## 2023-12-27 RX ORDER — METOPROLOL TARTRATE 50 MG
1 TABLET ORAL
Qty: 0 | Refills: 0 | DISCHARGE

## 2023-12-27 RX ORDER — DEXTROSE 50 % IN WATER 50 %
15 SYRINGE (ML) INTRAVENOUS ONCE
Refills: 0 | Status: DISCONTINUED | OUTPATIENT
Start: 2023-12-27 | End: 2023-12-30

## 2023-12-27 RX ORDER — IPRATROPIUM/ALBUTEROL SULFATE 18-103MCG
3 AEROSOL WITH ADAPTER (GRAM) INHALATION EVERY 6 HOURS
Refills: 0 | Status: DISCONTINUED | OUTPATIENT
Start: 2023-12-27 | End: 2023-12-30

## 2023-12-27 RX ORDER — ACETAMINOPHEN 500 MG
1000 TABLET ORAL ONCE
Refills: 0 | Status: COMPLETED | OUTPATIENT
Start: 2023-12-27 | End: 2023-12-27

## 2023-12-27 RX ORDER — METOPROLOL TARTRATE 50 MG
1 TABLET ORAL
Refills: 0 | DISCHARGE

## 2023-12-27 RX ORDER — METOPROLOL TARTRATE 50 MG
50 TABLET ORAL DAILY
Refills: 0 | Status: DISCONTINUED | OUTPATIENT
Start: 2023-12-27 | End: 2023-12-30

## 2023-12-27 RX ORDER — CEFTRIAXONE 500 MG/1
1000 INJECTION, POWDER, FOR SOLUTION INTRAMUSCULAR; INTRAVENOUS ONCE
Refills: 0 | Status: COMPLETED | OUTPATIENT
Start: 2023-12-27 | End: 2023-12-27

## 2023-12-27 RX ORDER — ALBUTEROL 90 UG/1
2 AEROSOL, METERED ORAL EVERY 6 HOURS
Refills: 0 | Status: DISCONTINUED | OUTPATIENT
Start: 2023-12-27 | End: 2023-12-30

## 2023-12-27 RX ADMIN — ATORVASTATIN CALCIUM 40 MILLIGRAM(S): 80 TABLET, FILM COATED ORAL at 21:26

## 2023-12-27 RX ADMIN — CEFTRIAXONE 1000 MILLIGRAM(S): 500 INJECTION, POWDER, FOR SOLUTION INTRAMUSCULAR; INTRAVENOUS at 18:34

## 2023-12-27 RX ADMIN — Medication 3 MILLILITER(S): at 21:47

## 2023-12-27 RX ADMIN — Medication 3 MILLILITER(S): at 15:40

## 2023-12-27 RX ADMIN — AZITHROMYCIN 255 MILLIGRAM(S): 500 TABLET, FILM COATED ORAL at 14:52

## 2023-12-27 RX ADMIN — ENOXAPARIN SODIUM 40 MILLIGRAM(S): 100 INJECTION SUBCUTANEOUS at 21:27

## 2023-12-27 RX ADMIN — Medication 3 MILLILITER(S): at 14:52

## 2023-12-27 RX ADMIN — Medication 150 GRAM(S): at 14:35

## 2023-12-27 RX ADMIN — Medication 400 MILLIGRAM(S): at 16:27

## 2023-12-27 RX ADMIN — AZITHROMYCIN 500 MILLIGRAM(S): 500 TABLET, FILM COATED ORAL at 18:36

## 2023-12-27 RX ADMIN — Medication 3 MILLILITER(S): at 15:20

## 2023-12-27 RX ADMIN — Medication 125 MILLIGRAM(S): at 14:35

## 2023-12-27 NOTE — ED PROVIDER NOTE - OBJECTIVE STATEMENT
68-year-old male patient with past medical history of COPD, hypertension, hyperlipidemia sent in by pulmonologist for shortness of breath.  Also w/ cough w/ change in sputum. Patient endorses worsening dyspnea on exertion for the last few days.  Today he got to the point where he could not walk around his pulmonologist office without being short of breath.  He denies chest pain, leg swelling, back pain, numbness, tingling, focal weakness, fever, chills, or any other complaints.

## 2023-12-27 NOTE — H&P ADULT - ASSESSMENT
68y/oM PMH MARY, COPD (not on home O2), HTN, HLD sent to ER from pulmonologists office (Dr. Mas) with cough x1 week and worsening dyspnea x2 days. In ER, was reportedly briefly hypoxic on RA, labs significant for wbc 19. Pt admitted with COPD exacerbation    COPD exacerbation   suspected bacterial pneumonia, likely gram positive   -admit to tele/  -RVP neg   -f/u bcx, sputum cx   -f/u legionella, strep pneumo   -s/p azithromycin, ceftriaxone, IV solumedrol in ER   -cont azithromycin, ceftriaxone   -CXR reviewed, pending official read; poss R-sided infiltrates noted   -supplemental O2 as needed   -IS   -cont IV solumedrol   -nebs  -ISS while on steroids     HTN, HLD   CAD  -cont home valsartan 160mg qd, metoprolol ER 50mg qd, atorvastatin 40mg qd, asa 81mg qd     Depression/anxiety  -cont sertraline 100mg qd     MARY   -nocturnal cpap ordered     vte ppx: lovenox sq

## 2023-12-27 NOTE — ED PROVIDER NOTE - CLINICAL SUMMARY MEDICAL DECISION MAKING FREE TEXT BOX
patient with past medical history of hypertension, hyperlipidemia, COPD sent in by pulmonology for COPD exacerbation.  Wheezing on exam.  Labs, EKG, chest x-ray, nebs, steroids, magnesium, azithromycin ordered. patient with past medical history of hypertension, hyperlipidemia, COPD sent in by pulmonology for COPD exacerbation.  Wheezing on exam.  Labs, EKG, chest x-ray, nebs, steroids, magnesium, azithromycin ordered. Pt briefly hypoxic on room air but improved after nebs and steroids. found to have R middle lobe infiltrate on CXR. admitted for COPD exacerbation in the setting of PNA

## 2023-12-27 NOTE — PHYSICAL EXAM
[TextEntry] : Gen - In NAD, communicating easily and in full sentences HEENT - NC/AT CV - +S1, S2 Resp - Diffuse B/L wheezing Ext - No pedal edema Skin - No exposed rashes or lesions Neuro - Moves all four extremities Psych - Normal affect

## 2023-12-27 NOTE — HISTORY OF PRESENT ILLNESS
[Former] : former [TextBox_4] : 68M PMH mild COPD, MARY on APAP, CAD, HTN, HLD who presents for f/u visit. Pt feels over several days has been having a cough and SOB. Denies any fevers. No chest pain. No N/V/D. He does not take any inhalers including maintenance or PRN.  [TextBox_11] : 1 [TextBox_13] : 25 [YearQuit] : 1990s

## 2023-12-27 NOTE — H&P ADULT - HISTORY OF PRESENT ILLNESS
68y/oM PMH MARY, COPD (not on home O2), HTN, HLD sent to ER from pulmonologists office (Dr. Mas) with cough x1 week and worsening dyspnea x2 days. In ER, was reportedly briefly hypoxic on RA, labs significant for wbc 19.    68y/oM PMH MARY, COPD (not on home O2), HTN, HLD sent to ER from pulmonologists office (Dr. Mas) with cough x1 week and worsening dyspnea x2 days. In ER, was reportedly briefly hypoxic on RA, labs significant for wbc 19.   At time of evaluation, pt denies fevers, chills, cp, abd pain, n/v/c/d, dysuria, sick contacts

## 2023-12-27 NOTE — ED ADULT NURSE REASSESSMENT NOTE - NS ED NURSE REASSESS COMMENT FT1
Report given to CONY Bowens.  Pt moved to A17L . Pt placed on cardiac monitor. Patient awake and alert, respirations even and unlabored, in no apparent distress.  Plan, abnormal labs, history of present illness, pending labs/tests explained, opportunity to answer questions provided.

## 2023-12-27 NOTE — H&P ADULT - NSHPLABSRESULTS_GEN_ALL_CORE
14.2   19.14 )-----------( 222      ( 27 Dec 2023 14:25 )             42.1   12-27    134<L>  |  95<L>  |  21.0<H>  ----------------------------<  109<H>  4.1   |  25.0  |  1.19    Ca    9.0      27 Dec 2023 14:25    TPro  6.7  /  Alb  4.1  /  TBili  0.5  /  DBili  x   /  AST  31  /  ALT  35  /  AlkPhos  113  12-27

## 2023-12-27 NOTE — ED PROVIDER NOTE - PHYSICAL EXAMINATION
Gen: NAD  Head: NC/AT  Neck: trachea midline  Card: regular rate and rhythm  Resp: b/l wheezing  Abd: soft, non-distended, non-tender  Ext: no deformities above reported baseline  Neuro:  A&O, no motor or sensory deficits above reported baseline  Skin:  Warm and dry as visualized  Psych:  Normal affect and mood

## 2023-12-27 NOTE — CHART NOTE - NSCHARTNOTEFT_GEN_A_CORE
Patient is seen by [facility]. Dr. Santos is aware. Patient is seen by [facility]. Dr. Santos is aware.

## 2023-12-27 NOTE — ASSESSMENT
[FreeTextEntry1] : 68M PMH mild COPD, MARY on APAP, CAD, HTN, HLD who presents for f/u visit.  - Likely acute exacerbation of COPD - Unable to obtain adequate waveform on pulse oximetry - Pt having difficulty with ambulation, SOB - Will call for ambulance and he will be seen at [facility] - Likely will need IV steroids, +/- antibiotics, nebulizer treatments - F/u in short interim - 2-3 weeks time  The patient expressed understanding and agreement with the plan as outlined above, and accepts that it is their responsibility to be compliant with any recommended testing, treatment, and follow-up visits. All relevant questions and concerns were addressed.  25 minutes of time were spent on the encounter. Medical records were reviewed, including but not limited to hospital records, outpatient records, laboratory data, and diagnostic imaging studies. Greater than 50% of the face-to-face encounter time was spent on counseling and/or coordination of care.  Celestino Mas M.D. Pulmonary & Critical Care Medicine [facility] [place] [address] [facility] [place], [place] [address] T: [phone] F: [phone]

## 2023-12-27 NOTE — ED ADULT TRIAGE NOTE - CHIEF COMPLAINT QUOTE
Pt BIBA from pulmonologist office c/o sob, worsening ROBERTS x 1 week.  Pt received nebulizer treatment from EMS.

## 2023-12-27 NOTE — ED PROVIDER NOTE - CARE PLAN
Principal Discharge DX:	Acute hypoxic respiratory failure  Secondary Diagnosis:	Pneumonia  Secondary Diagnosis:	COPD exacerbation   1

## 2023-12-27 NOTE — H&P ADULT - NSHPPHYSICALEXAM_GEN_ALL_CORE
CONSTITUTIONAL:  NAD  CARDIAC: Regular rate, regular rhythm.  normal +S1, S2.    RESPIRATORY:   GASTROINTESTINAL: Abdomen soft, non-tender, no guarding.  NEUROLOGICAL: Alert and oriented, grossly non-focal   SKIN: warm, dry  PSYCHIATRIC: calm, cooperative CONSTITUTIONAL:  NAD  CARDIAC: Regular rate, regular rhythm.  normal +S1, S2.    RESPIRATORY:  course sounds b/l, +expiratory wheezing; respirations unlabored on RA   GASTROINTESTINAL: Abdomen soft, non-tender, no guarding.  NEUROLOGICAL: Alert and oriented, grossly non-focal   SKIN: warm, dry  PSYCHIATRIC: calm, cooperative

## 2023-12-27 NOTE — H&P ADULT - NSICDXPASTMEDICALHX_GEN_ALL_CORE_FT
PAST MEDICAL HISTORY:  Anxiety     Chronic obstructive pulmonary disease (COPD)     HTN (hypertension)     Hyperlipidemia     Obese     Obstructive sleep apnea on CPAP

## 2023-12-27 NOTE — ED PROVIDER NOTE - ATTENDING CONTRIBUTION TO CARE
68-year-old male patient with past medical history of COPD, hypertension, hyperlipidemia sent in by pulmonologist for shortness of breath with change in sputum color; concern for pna, cxr with lobar infiltrate, patient was briefly hypoxic earlier but imrpved with nebs, still feels very weak, PSI score with moderately high risk. D/w hospitalist for admit

## 2023-12-28 LAB
A1C WITH ESTIMATED AVERAGE GLUCOSE RESULT: 5.8 % — HIGH (ref 4–5.6)
A1C WITH ESTIMATED AVERAGE GLUCOSE RESULT: 5.8 % — HIGH (ref 4–5.6)
ALBUMIN SERPL ELPH-MCNC: 3.9 G/DL — SIGNIFICANT CHANGE UP (ref 3.3–5.2)
ALBUMIN SERPL ELPH-MCNC: 3.9 G/DL — SIGNIFICANT CHANGE UP (ref 3.3–5.2)
ALP SERPL-CCNC: 96 U/L — SIGNIFICANT CHANGE UP (ref 40–120)
ALP SERPL-CCNC: 96 U/L — SIGNIFICANT CHANGE UP (ref 40–120)
ALT FLD-CCNC: 32 U/L — SIGNIFICANT CHANGE UP
ALT FLD-CCNC: 32 U/L — SIGNIFICANT CHANGE UP
ANION GAP SERPL CALC-SCNC: 12 MMOL/L — SIGNIFICANT CHANGE UP (ref 5–17)
ANION GAP SERPL CALC-SCNC: 12 MMOL/L — SIGNIFICANT CHANGE UP (ref 5–17)
AST SERPL-CCNC: 25 U/L — SIGNIFICANT CHANGE UP
AST SERPL-CCNC: 25 U/L — SIGNIFICANT CHANGE UP
BASOPHILS # BLD AUTO: 0.04 K/UL — SIGNIFICANT CHANGE UP (ref 0–0.2)
BASOPHILS # BLD AUTO: 0.04 K/UL — SIGNIFICANT CHANGE UP (ref 0–0.2)
BASOPHILS NFR BLD AUTO: 0.2 % — SIGNIFICANT CHANGE UP (ref 0–2)
BASOPHILS NFR BLD AUTO: 0.2 % — SIGNIFICANT CHANGE UP (ref 0–2)
BILIRUB SERPL-MCNC: 0.5 MG/DL — SIGNIFICANT CHANGE UP (ref 0.4–2)
BILIRUB SERPL-MCNC: 0.5 MG/DL — SIGNIFICANT CHANGE UP (ref 0.4–2)
BUN SERPL-MCNC: 25.6 MG/DL — HIGH (ref 8–20)
BUN SERPL-MCNC: 25.6 MG/DL — HIGH (ref 8–20)
CALCIUM SERPL-MCNC: 8.9 MG/DL — SIGNIFICANT CHANGE UP (ref 8.4–10.5)
CALCIUM SERPL-MCNC: 8.9 MG/DL — SIGNIFICANT CHANGE UP (ref 8.4–10.5)
CHLORIDE SERPL-SCNC: 93 MMOL/L — LOW (ref 96–108)
CHLORIDE SERPL-SCNC: 93 MMOL/L — LOW (ref 96–108)
CO2 SERPL-SCNC: 26 MMOL/L — SIGNIFICANT CHANGE UP (ref 22–29)
CO2 SERPL-SCNC: 26 MMOL/L — SIGNIFICANT CHANGE UP (ref 22–29)
CREAT SERPL-MCNC: 1.18 MG/DL — SIGNIFICANT CHANGE UP (ref 0.5–1.3)
CREAT SERPL-MCNC: 1.18 MG/DL — SIGNIFICANT CHANGE UP (ref 0.5–1.3)
EGFR: 66 ML/MIN/1.73M2 — SIGNIFICANT CHANGE UP
EGFR: 66 ML/MIN/1.73M2 — SIGNIFICANT CHANGE UP
EOSINOPHIL # BLD AUTO: 0 K/UL — SIGNIFICANT CHANGE UP (ref 0–0.5)
EOSINOPHIL # BLD AUTO: 0 K/UL — SIGNIFICANT CHANGE UP (ref 0–0.5)
EOSINOPHIL NFR BLD AUTO: 0 % — SIGNIFICANT CHANGE UP (ref 0–6)
EOSINOPHIL NFR BLD AUTO: 0 % — SIGNIFICANT CHANGE UP (ref 0–6)
ESTIMATED AVERAGE GLUCOSE: 120 MG/DL — HIGH (ref 68–114)
ESTIMATED AVERAGE GLUCOSE: 120 MG/DL — HIGH (ref 68–114)
GLUCOSE BLDC GLUCOMTR-MCNC: 131 MG/DL — HIGH (ref 70–99)
GLUCOSE BLDC GLUCOMTR-MCNC: 131 MG/DL — HIGH (ref 70–99)
GLUCOSE BLDC GLUCOMTR-MCNC: 141 MG/DL — HIGH (ref 70–99)
GLUCOSE BLDC GLUCOMTR-MCNC: 141 MG/DL — HIGH (ref 70–99)
GLUCOSE BLDC GLUCOMTR-MCNC: 145 MG/DL — HIGH (ref 70–99)
GLUCOSE BLDC GLUCOMTR-MCNC: 145 MG/DL — HIGH (ref 70–99)
GLUCOSE BLDC GLUCOMTR-MCNC: 148 MG/DL — HIGH (ref 70–99)
GLUCOSE BLDC GLUCOMTR-MCNC: 148 MG/DL — HIGH (ref 70–99)
GLUCOSE SERPL-MCNC: 128 MG/DL — HIGH (ref 70–99)
GLUCOSE SERPL-MCNC: 128 MG/DL — HIGH (ref 70–99)
HCT VFR BLD CALC: 40.7 % — SIGNIFICANT CHANGE UP (ref 39–50)
HCT VFR BLD CALC: 40.7 % — SIGNIFICANT CHANGE UP (ref 39–50)
HCV AB S/CO SERPL IA: 0.25 S/CO — SIGNIFICANT CHANGE UP (ref 0–0.99)
HCV AB S/CO SERPL IA: 0.25 S/CO — SIGNIFICANT CHANGE UP (ref 0–0.99)
HCV AB SERPL-IMP: SIGNIFICANT CHANGE UP
HCV AB SERPL-IMP: SIGNIFICANT CHANGE UP
HGB BLD-MCNC: 13.6 G/DL — SIGNIFICANT CHANGE UP (ref 13–17)
HGB BLD-MCNC: 13.6 G/DL — SIGNIFICANT CHANGE UP (ref 13–17)
IMM GRANULOCYTES NFR BLD AUTO: 0.7 % — SIGNIFICANT CHANGE UP (ref 0–0.9)
IMM GRANULOCYTES NFR BLD AUTO: 0.7 % — SIGNIFICANT CHANGE UP (ref 0–0.9)
LEGIONELLA AG UR QL: NEGATIVE — SIGNIFICANT CHANGE UP
LEGIONELLA AG UR QL: NEGATIVE — SIGNIFICANT CHANGE UP
LYMPHOCYTES # BLD AUTO: 1.22 K/UL — SIGNIFICANT CHANGE UP (ref 1–3.3)
LYMPHOCYTES # BLD AUTO: 1.22 K/UL — SIGNIFICANT CHANGE UP (ref 1–3.3)
LYMPHOCYTES # BLD AUTO: 5 % — LOW (ref 13–44)
LYMPHOCYTES # BLD AUTO: 5 % — LOW (ref 13–44)
MAGNESIUM SERPL-MCNC: 2.3 MG/DL — SIGNIFICANT CHANGE UP (ref 1.6–2.6)
MAGNESIUM SERPL-MCNC: 2.3 MG/DL — SIGNIFICANT CHANGE UP (ref 1.6–2.6)
MCHC RBC-ENTMCNC: 29.2 PG — SIGNIFICANT CHANGE UP (ref 27–34)
MCHC RBC-ENTMCNC: 29.2 PG — SIGNIFICANT CHANGE UP (ref 27–34)
MCHC RBC-ENTMCNC: 33.4 GM/DL — SIGNIFICANT CHANGE UP (ref 32–36)
MCHC RBC-ENTMCNC: 33.4 GM/DL — SIGNIFICANT CHANGE UP (ref 32–36)
MCV RBC AUTO: 87.3 FL — SIGNIFICANT CHANGE UP (ref 80–100)
MCV RBC AUTO: 87.3 FL — SIGNIFICANT CHANGE UP (ref 80–100)
MONOCYTES # BLD AUTO: 1.01 K/UL — HIGH (ref 0–0.9)
MONOCYTES # BLD AUTO: 1.01 K/UL — HIGH (ref 0–0.9)
MONOCYTES NFR BLD AUTO: 4.2 % — SIGNIFICANT CHANGE UP (ref 2–14)
MONOCYTES NFR BLD AUTO: 4.2 % — SIGNIFICANT CHANGE UP (ref 2–14)
MRSA PCR RESULT.: SIGNIFICANT CHANGE UP
MRSA PCR RESULT.: SIGNIFICANT CHANGE UP
NEUTROPHILS # BLD AUTO: 21.86 K/UL — HIGH (ref 1.8–7.4)
NEUTROPHILS # BLD AUTO: 21.86 K/UL — HIGH (ref 1.8–7.4)
NEUTROPHILS NFR BLD AUTO: 89.9 % — HIGH (ref 43–77)
NEUTROPHILS NFR BLD AUTO: 89.9 % — HIGH (ref 43–77)
PHOSPHATE SERPL-MCNC: 3.1 MG/DL — SIGNIFICANT CHANGE UP (ref 2.4–4.7)
PHOSPHATE SERPL-MCNC: 3.1 MG/DL — SIGNIFICANT CHANGE UP (ref 2.4–4.7)
PLATELET # BLD AUTO: 239 K/UL — SIGNIFICANT CHANGE UP (ref 150–400)
PLATELET # BLD AUTO: 239 K/UL — SIGNIFICANT CHANGE UP (ref 150–400)
POTASSIUM SERPL-MCNC: 4.5 MMOL/L — SIGNIFICANT CHANGE UP (ref 3.5–5.3)
POTASSIUM SERPL-MCNC: 4.5 MMOL/L — SIGNIFICANT CHANGE UP (ref 3.5–5.3)
POTASSIUM SERPL-SCNC: 4.5 MMOL/L — SIGNIFICANT CHANGE UP (ref 3.5–5.3)
POTASSIUM SERPL-SCNC: 4.5 MMOL/L — SIGNIFICANT CHANGE UP (ref 3.5–5.3)
PROT SERPL-MCNC: 6.9 G/DL — SIGNIFICANT CHANGE UP (ref 6.6–8.7)
PROT SERPL-MCNC: 6.9 G/DL — SIGNIFICANT CHANGE UP (ref 6.6–8.7)
RBC # BLD: 4.66 M/UL — SIGNIFICANT CHANGE UP (ref 4.2–5.8)
RBC # BLD: 4.66 M/UL — SIGNIFICANT CHANGE UP (ref 4.2–5.8)
RBC # FLD: 13.6 % — SIGNIFICANT CHANGE UP (ref 10.3–14.5)
RBC # FLD: 13.6 % — SIGNIFICANT CHANGE UP (ref 10.3–14.5)
S AUREUS DNA NOSE QL NAA+PROBE: SIGNIFICANT CHANGE UP
S AUREUS DNA NOSE QL NAA+PROBE: SIGNIFICANT CHANGE UP
S PNEUM AG UR QL: NEGATIVE — SIGNIFICANT CHANGE UP
S PNEUM AG UR QL: NEGATIVE — SIGNIFICANT CHANGE UP
SODIUM SERPL-SCNC: 131 MMOL/L — LOW (ref 135–145)
SODIUM SERPL-SCNC: 131 MMOL/L — LOW (ref 135–145)
WBC # BLD: 24.29 K/UL — HIGH (ref 3.8–10.5)
WBC # BLD: 24.29 K/UL — HIGH (ref 3.8–10.5)
WBC # FLD AUTO: 24.29 K/UL — HIGH (ref 3.8–10.5)
WBC # FLD AUTO: 24.29 K/UL — HIGH (ref 3.8–10.5)

## 2023-12-28 PROCEDURE — 99232 SBSQ HOSP IP/OBS MODERATE 35: CPT

## 2023-12-28 RX ADMIN — ENOXAPARIN SODIUM 40 MILLIGRAM(S): 100 INJECTION SUBCUTANEOUS at 22:10

## 2023-12-28 RX ADMIN — Medication 3 MILLILITER(S): at 22:48

## 2023-12-28 RX ADMIN — CEFTRIAXONE 1000 MILLIGRAM(S): 500 INJECTION, POWDER, FOR SOLUTION INTRAMUSCULAR; INTRAVENOUS at 17:28

## 2023-12-28 RX ADMIN — ATORVASTATIN CALCIUM 40 MILLIGRAM(S): 80 TABLET, FILM COATED ORAL at 22:10

## 2023-12-28 RX ADMIN — Medication 3 MILLILITER(S): at 16:06

## 2023-12-28 RX ADMIN — VALSARTAN 160 MILLIGRAM(S): 80 TABLET ORAL at 05:52

## 2023-12-28 RX ADMIN — Medication 40 MILLIGRAM(S): at 17:29

## 2023-12-28 RX ADMIN — AZITHROMYCIN 255 MILLIGRAM(S): 500 TABLET, FILM COATED ORAL at 17:29

## 2023-12-28 RX ADMIN — Medication 50 MILLIGRAM(S): at 10:14

## 2023-12-28 RX ADMIN — Medication 40 MILLIGRAM(S): at 05:52

## 2023-12-28 RX ADMIN — Medication 81 MILLIGRAM(S): at 10:14

## 2023-12-28 RX ADMIN — SERTRALINE 100 MILLIGRAM(S): 25 TABLET, FILM COATED ORAL at 10:14

## 2023-12-28 RX ADMIN — Medication 3 MILLILITER(S): at 09:22

## 2023-12-28 NOTE — CONSULT NOTE ADULT - SUBJECTIVE AND OBJECTIVE BOX
[facility] - [facility], THE [facility] CENTER                                   [address] [place] [address]                                                      PHONE: [phone]                                                         FAX: [phone]  [email or web address]  ---------------------------------------------------------------------------------------------------------------------------------    Reason for Consult: Elevated troponin    HPI: 68 year old male with a PMHx of COPD, HTN, HLD, MARY who presents with cough and SOB found to have COPD exacerbation.    PAST MEDICAL & SURGICAL HISTORY:  HTN (hypertension)      Anxiety      Chronic obstructive pulmonary disease (COPD)      Obstructive sleep apnea on CPAP      Obese      Hyperlipidemia      H/O right inguinal hernia repair          Levaquin (Unknown)      MEDICATIONS  (STANDING):  albuterol    90 MICROgram(s) HFA Inhaler 2 Puff(s) Inhalation every 6 hours  albuterol/ipratropium for Nebulization 3 milliLiter(s) Nebulizer every 6 hours  aspirin enteric coated 81 milliGRAM(s) Oral daily  atorvastatin 40 milliGRAM(s) Oral at bedtime  azithromycin  IVPB      azithromycin  IVPB 500 milliGRAM(s) IV Intermittent every 24 hours  cefTRIAXone Injectable. 1000 milliGRAM(s) IV Push every 24 hours  dextrose 5%. 1000 milliLiter(s) (100 mL/Hr) IV Continuous <Continuous>  dextrose 5%. 1000 milliLiter(s) (50 mL/Hr) IV Continuous <Continuous>  dextrose 50% Injectable 25 Gram(s) IV Push once  dextrose 50% Injectable 12.5 Gram(s) IV Push once  dextrose 50% Injectable 25 Gram(s) IV Push once  enoxaparin Injectable 40 milliGRAM(s) SubCutaneous every 24 hours  glucagon  Injectable 1 milliGRAM(s) IntraMuscular once  insulin lispro (ADMELOG) corrective regimen sliding scale   SubCutaneous at bedtime  insulin lispro (ADMELOG) corrective regimen sliding scale   SubCutaneous three times a day before meals  methylPREDNISolone sodium succinate Injectable 40 milliGRAM(s) IV Push every 12 hours  metoprolol succinate ER 50 milliGRAM(s) Oral daily  sertraline 100 milliGRAM(s) Oral daily  tiotropium 2.5 MICROgram(s) Inhaler 2 Puff(s) Inhalation daily  valsartan 160 milliGRAM(s) Oral daily    MEDICATIONS  (PRN):  acetaminophen     Tablet .. 650 milliGRAM(s) Oral every 6 hours PRN Temp greater or equal to 38C (100.4F), Mild Pain (1 - 3)  aluminum hydroxide/magnesium hydroxide/simethicone Suspension 30 milliLiter(s) Oral every 4 hours PRN Dyspepsia  benzonatate 100 milliGRAM(s) Oral every 8 hours PRN Cough  dextrose Oral Gel 15 Gram(s) Oral once PRN Blood Glucose LESS THAN 70 milliGRAM(s)/deciliter  guaifenesin/dextromethorphan Oral Liquid 10 milliLiter(s) Oral every 4 hours PRN Cough  melatonin 3 milliGRAM(s) Oral at bedtime PRN Insomnia  ondansetron Injectable 4 milliGRAM(s) IV Push every 8 hours PRN Nausea and/or Vomiting      Social History:  Cigarettes:  Denies current tobacco use                  Alcohol:  Denies daily etoh            Illicit Drug Abuse:  Denies    Family History:  denies CAD, MI, CVA, or sudden death.    ROS: Negative other than as mentioned in HPI.    Vital Signs Last 24 Hrs  T(C): 36.3 (28 Dec 2023 03:11), Max: 37.1 (27 Dec 2023 20:17)  T(F): 97.4 (28 Dec 2023 03:11), Max: 98.8 (27 Dec 2023 20:17)  HR: 70 (28 Dec 2023 05:36) (54 - 94)  BP: 119/76 (28 Dec 2023 03:11) (117/79 - 142/72)  BP(mean): --  RR: 20 (28 Dec 2023 03:11) (16 - 22)  SpO2: 97% (28 Dec 2023 03:11) (92% - 97%)    Parameters below as of 28 Dec 2023 03:11  Patient On (Oxygen Delivery Method): BiPAP/CPAP      ICU Vital Signs Last 24 Hrs  KIMBERLY DEEPALI  I&O's Detail    27 Dec 2023 07:01  -  28 Dec 2023 07:00  --------------------------------------------------------  IN:  Total IN: 0 mL    OUT:    Voided (mL): 800 mL  Total OUT: 800 mL    Total NET: -800 mL        I&O's Summary    27 Dec 2023 07:01  -  28 Dec 2023 07:00  --------------------------------------------------------  IN: 0 mL / OUT: 800 mL / NET: -800 mL      Drug Dosing Weight  KIMBERLY PALUMBO      PHYSICAL EXAM:  General: NAD  HEENT: Head; normocephalic, atraumatic.  Eyes: EOMI, conjunctiva normal  Neck: Supple, no JVD noted  CARDIOVASCULAR: RRR, S1 S2, No murmurs or gallops  LUNGS: Clear to auscultation b/l, No rales, rhonchi or wheeze, normal inspiratory effort  ABDOMEN: Soft, nontender, non-distended, +bowel sounds  EXTREMITIES: No edema b/l, no cyanosis   SKIN: warm and dry  NEURO: Alert/oriented x 3  PSYCH: normal affect.        LABS:                        13.6   24.29 )-----------( 239      ( 28 Dec 2023 06:32 )             40.7     12-28    131<L>  |  93<L>  |  25.6<H>  ----------------------------<  128<H>  4.5   |  26.0  |  1.18    Ca    8.9      28 Dec 2023 06:32  Phos  3.1     12-28  Mg     2.3     12-28    TPro  6.9  /  Alb  3.9  /  TBili  0.5  /  DBili  x   /  AST  25  /  ALT  32  /  AlkPhos  96  12-28    KIMBERLY PALUMBO      PT/INR - ( 27 Dec 2023 14:25 )   PT: 10.9 sec;   INR: 0.98 ratio         PTT - ( 27 Dec 2023 14:25 )  PTT:30.5 sec  Urinalysis Basic - ( 28 Dec 2023 06:32 )    Color: x / Appearance: x / SG: x / pH: x  Gluc: 128 mg/dL / Ketone: x  / Bili: x / Urobili: x   Blood: x / Protein: x / Nitrite: x   Leuk Esterase: x / RBC: x / WBC x   Sq Epi: x / Non Sq Epi: x / Bacteria: x        RADIOLOGY & ADDITIONAL STUDIES:    INTERPRETATION OF TELEMETRY (personally reviewed):    ECG (personally reviewed): sinus rhythm, no acute ischemic changes     Assessment and Plan:  68 year old male with a PMHx of COPD, HTN, HLD, MARY who presents with cough and SOB found to have COPD exacerbation.    Elevated troponin  -likely due to demand ischemia due to hypoxia  -no need to further trend troponins  -ECG without acute ischemic changes    COPD/PNA  -management as per primary team    Cardiology to sign off. Please call back with any questions.    Thank you for letting [facility] to assist in the management of this patient. Please call with any questions.                 [facility] - [facility], THE HEART CENTER                                   [address] [place] [address]                                                      PHONE: [phone]                                                         FAX: [phone]  [email or web address]  ---------------------------------------------------------------------------------------------------------------------------------    Reason for Consult: Elevated troponin    HPI: 68 year old male with a PMHx of COPD, HTN, HLD, MARY who presents with cough and SOB found to have COPD exacerbation.    PAST MEDICAL & SURGICAL HISTORY:  HTN (hypertension)      Anxiety      Chronic obstructive pulmonary disease (COPD)      Obstructive sleep apnea on CPAP      Obese      Hyperlipidemia      H/O right inguinal hernia repair          Levaquin (Unknown)      MEDICATIONS  (STANDING):  albuterol    90 MICROgram(s) HFA Inhaler 2 Puff(s) Inhalation every 6 hours  albuterol/ipratropium for Nebulization 3 milliLiter(s) Nebulizer every 6 hours  aspirin enteric coated 81 milliGRAM(s) Oral daily  atorvastatin 40 milliGRAM(s) Oral at bedtime  azithromycin  IVPB      azithromycin  IVPB 500 milliGRAM(s) IV Intermittent every 24 hours  cefTRIAXone Injectable. 1000 milliGRAM(s) IV Push every 24 hours  dextrose 5%. 1000 milliLiter(s) (100 mL/Hr) IV Continuous <Continuous>  dextrose 5%. 1000 milliLiter(s) (50 mL/Hr) IV Continuous <Continuous>  dextrose 50% Injectable 25 Gram(s) IV Push once  dextrose 50% Injectable 12.5 Gram(s) IV Push once  dextrose 50% Injectable 25 Gram(s) IV Push once  enoxaparin Injectable 40 milliGRAM(s) SubCutaneous every 24 hours  glucagon  Injectable 1 milliGRAM(s) IntraMuscular once  insulin lispro (ADMELOG) corrective regimen sliding scale   SubCutaneous at bedtime  insulin lispro (ADMELOG) corrective regimen sliding scale   SubCutaneous three times a day before meals  methylPREDNISolone sodium succinate Injectable 40 milliGRAM(s) IV Push every 12 hours  metoprolol succinate ER 50 milliGRAM(s) Oral daily  sertraline 100 milliGRAM(s) Oral daily  tiotropium 2.5 MICROgram(s) Inhaler 2 Puff(s) Inhalation daily  valsartan 160 milliGRAM(s) Oral daily    MEDICATIONS  (PRN):  acetaminophen     Tablet .. 650 milliGRAM(s) Oral every 6 hours PRN Temp greater or equal to 38C (100.4F), Mild Pain (1 - 3)  aluminum hydroxide/magnesium hydroxide/simethicone Suspension 30 milliLiter(s) Oral every 4 hours PRN Dyspepsia  benzonatate 100 milliGRAM(s) Oral every 8 hours PRN Cough  dextrose Oral Gel 15 Gram(s) Oral once PRN Blood Glucose LESS THAN 70 milliGRAM(s)/deciliter  guaifenesin/dextromethorphan Oral Liquid 10 milliLiter(s) Oral every 4 hours PRN Cough  melatonin 3 milliGRAM(s) Oral at bedtime PRN Insomnia  ondansetron Injectable 4 milliGRAM(s) IV Push every 8 hours PRN Nausea and/or Vomiting      Social History:  Cigarettes:  Denies current tobacco use                  Alcohol:  Denies daily etoh            Illicit Drug Abuse:  Denies    Family History:  denies CAD, MI, CVA, or sudden death.    ROS: Negative other than as mentioned in HPI.    Vital Signs Last 24 Hrs  T(C): 36.3 (28 Dec 2023 03:11), Max: 37.1 (27 Dec 2023 20:17)  T(F): 97.4 (28 Dec 2023 03:11), Max: 98.8 (27 Dec 2023 20:17)  HR: 70 (28 Dec 2023 05:36) (54 - 94)  BP: 119/76 (28 Dec 2023 03:11) (117/79 - 142/72)  BP(mean): --  RR: 20 (28 Dec 2023 03:11) (16 - 22)  SpO2: 97% (28 Dec 2023 03:11) (92% - 97%)    Parameters below as of 28 Dec 2023 03:11  Patient On (Oxygen Delivery Method): BiPAP/CPAP      ICU Vital Signs Last 24 Hrs  KIMBERLY DEEPALI  I&O's Detail    27 Dec 2023 07:01  -  28 Dec 2023 07:00  --------------------------------------------------------  IN:  Total IN: 0 mL    OUT:    Voided (mL): 800 mL  Total OUT: 800 mL    Total NET: -800 mL        I&O's Summary    27 Dec 2023 07:01  -  28 Dec 2023 07:00  --------------------------------------------------------  IN: 0 mL / OUT: 800 mL / NET: -800 mL      Drug Dosing Weight  KIMBERLY PALUMBO      PHYSICAL EXAM:  General: NAD  HEENT: Head; normocephalic, atraumatic.  Eyes: EOMI, conjunctiva normal  Neck: Supple, no JVD noted  CARDIOVASCULAR: RRR, S1 S2, No murmurs or gallops  LUNGS: Clear to auscultation b/l, No rales, rhonchi or wheeze, normal inspiratory effort  ABDOMEN: Soft, nontender, non-distended, +bowel sounds  EXTREMITIES: No edema b/l, no cyanosis   SKIN: warm and dry  NEURO: Alert/oriented x 3  PSYCH: normal affect.        LABS:                        13.6   24.29 )-----------( 239      ( 28 Dec 2023 06:32 )             40.7     12-28    131<L>  |  93<L>  |  25.6<H>  ----------------------------<  128<H>  4.5   |  26.0  |  1.18    Ca    8.9      28 Dec 2023 06:32  Phos  3.1     12-28  Mg     2.3     12-28    TPro  6.9  /  Alb  3.9  /  TBili  0.5  /  DBili  x   /  AST  25  /  ALT  32  /  AlkPhos  96  12-28    KIMBERLY PALUMBO      PT/INR - ( 27 Dec 2023 14:25 )   PT: 10.9 sec;   INR: 0.98 ratio         PTT - ( 27 Dec 2023 14:25 )  PTT:30.5 sec  Urinalysis Basic - ( 28 Dec 2023 06:32 )    Color: x / Appearance: x / SG: x / pH: x  Gluc: 128 mg/dL / Ketone: x  / Bili: x / Urobili: x   Blood: x / Protein: x / Nitrite: x   Leuk Esterase: x / RBC: x / WBC x   Sq Epi: x / Non Sq Epi: x / Bacteria: x        RADIOLOGY & ADDITIONAL STUDIES:    INTERPRETATION OF TELEMETRY (personally reviewed):    ECG (personally reviewed): sinus rhythm, no acute ischemic changes     Assessment and Plan:  68 year old male with a PMHx of COPD, HTN, HLD, MARY who presents with cough and SOB found to have COPD exacerbation.    Elevated troponin  -likely due to demand ischemia due to hypoxia  -no need to further trend troponins  -ECG without acute ischemic changes    COPD/PNA  -management as per primary team    Cardiology to sign off. Please call back with any questions.    Thank you for letting [facility] to assist in the management of this patient. Please call with any questions.

## 2023-12-28 NOTE — CONSULT NOTE ADULT - SUBJECTIVE AND OBJECTIVE BOX
KIMBERLY PALUMBO  [id number]    CC:  Patient is a 68y old  Male who presents with a chief complaint of SOB, cough, fever    HPI:  67 y/o M PMH MARY, COPD (not on home O2), HTN, HLD sent to ER from pulmonologists office (Dr. Mas) with cough/ROBERTS x 1 week and worsening last x2 days. In ER, was reportedly briefly hypoxic on RA, labs significant for wbc 19.   At time of evaluation, pt denies fevers, chills, cp, abd pain, n/v/c/d, dysuria, sick contacts  (27 Dec 2023 17:32)    ALLERGIES:    Levaquin (Unknown)      PAST MEDICAL & SURGICAL HISTORY:  HTN (hypertension)  MARY on CPAP  Carotid atherosclerosis  Non obstructive CAD  Anxiety  Obese  Hyperlipidemia  H/O right inguinal hernia repair      MEDICATIONS:  MEDICATIONS  (STANDING):  albuterol    90 MICROgram(s) HFA Inhaler 2 Puff(s) Inhalation every 6 hours  albuterol/ipratropium for Nebulization 3 milliLiter(s) Nebulizer every 6 hours  aspirin enteric coated 81 milliGRAM(s) Oral daily  atorvastatin 40 milliGRAM(s) Oral at bedtime  azithromycin  IVPB      azithromycin  IVPB 500 milliGRAM(s) IV Intermittent every 24 hours  cefTRIAXone Injectable. 1000 milliGRAM(s) IV Push every 24 hours  dextrose 5%. 1000 milliLiter(s) (50 mL/Hr) IV Continuous <Continuous>  dextrose 5%. 1000 milliLiter(s) (100 mL/Hr) IV Continuous <Continuous>  dextrose 50% Injectable 12.5 Gram(s) IV Push once  dextrose 50% Injectable 25 Gram(s) IV Push once  dextrose 50% Injectable 25 Gram(s) IV Push once  enoxaparin Injectable 40 milliGRAM(s) SubCutaneous every 24 hours  glucagon  Injectable 1 milliGRAM(s) IntraMuscular once  insulin lispro (ADMELOG) corrective regimen sliding scale   SubCutaneous at bedtime  insulin lispro (ADMELOG) corrective regimen sliding scale   SubCutaneous three times a day before meals  methylPREDNISolone sodium succinate Injectable 40 milliGRAM(s) IV Push every 12 hours  metoprolol succinate ER 50 milliGRAM(s) Oral daily  sertraline 100 milliGRAM(s) Oral daily  tiotropium 2.5 MICROgram(s) Inhaler 2 Puff(s) Inhalation daily  valsartan 160 milliGRAM(s) Oral daily    MEDICATIONS  (PRN):  acetaminophen     Tablet .. 650 milliGRAM(s) Oral every 6 hours PRN Temp greater or equal to 38C (100.4F), Mild Pain (1 - 3)  aluminum hydroxide/magnesium hydroxide/simethicone Suspension 30 milliLiter(s) Oral every 4 hours PRN Dyspepsia  benzonatate 100 milliGRAM(s) Oral every 8 hours PRN Cough  dextrose Oral Gel 15 Gram(s) Oral once PRN Blood Glucose LESS THAN 70 milliGRAM(s)/deciliter  guaifenesin/dextromethorphan Oral Liquid 10 milliLiter(s) Oral every 4 hours PRN Cough  melatonin 3 milliGRAM(s) Oral at bedtime PRN Insomnia  ondansetron Injectable 4 milliGRAM(s) IV Push every 8 hours PRN Nausea and/or Vomiting    acetaminophen     Tablet .. 650 milliGRAM(s) Oral every 6 hours PRN  albuterol    90 MICROgram(s) HFA Inhaler 2 Puff(s) Inhalation every 6 hours  albuterol/ipratropium for Nebulization 3 milliLiter(s) Nebulizer every 6 hours  aluminum hydroxide/magnesium hydroxide/simethicone Suspension 30 milliLiter(s) Oral every 4 hours PRN  aspirin enteric coated 81 milliGRAM(s) Oral daily  atorvastatin 40 milliGRAM(s) Oral at bedtime  azithromycin  IVPB      azithromycin  IVPB 500 milliGRAM(s) IV Intermittent every 24 hours  benzonatate 100 milliGRAM(s) Oral every 8 hours PRN  cefTRIAXone Injectable. 1000 milliGRAM(s) IV Push every 24 hours  dextrose 5%. 1000 milliLiter(s) IV Continuous <Continuous>  dextrose 5%. 1000 milliLiter(s) IV Continuous <Continuous>  dextrose 50% Injectable 12.5 Gram(s) IV Push once  dextrose 50% Injectable 25 Gram(s) IV Push once  dextrose 50% Injectable 25 Gram(s) IV Push once  dextrose Oral Gel 15 Gram(s) Oral once PRN  enoxaparin Injectable 40 milliGRAM(s) SubCutaneous every 24 hours  glucagon  Injectable 1 milliGRAM(s) IntraMuscular once  guaifenesin/dextromethorphan Oral Liquid 10 milliLiter(s) Oral every 4 hours PRN  insulin lispro (ADMELOG) corrective regimen sliding scale   SubCutaneous at bedtime  insulin lispro (ADMELOG) corrective regimen sliding scale   SubCutaneous three times a day before meals  melatonin 3 milliGRAM(s) Oral at bedtime PRN  methylPREDNISolone sodium succinate Injectable 40 milliGRAM(s) IV Push every 12 hours  metoprolol succinate ER 50 milliGRAM(s) Oral daily  ondansetron Injectable 4 milliGRAM(s) IV Push every 8 hours PRN  sertraline 100 milliGRAM(s) Oral daily  tiotropium 2.5 MICROgram(s) Inhaler 2 Puff(s) Inhalation daily  valsartan 160 milliGRAM(s) Oral daily      SOCIAL HISTORY:  Patient denies alcohol, tobacco, drug use    FAMILY HISTORY:  FH: diabetes mellitus  sister    FH: COPD (chronic obstructive pulmonary disease)  father    ROS:  Other than as stated above 13 pointl ROS is unremarkable      PHYSICAL EXAM:  Vital Signs Last 24 Hrs  T(C): 36.6 (28 Dec 2023 08:00), Max: 37.1 (27 Dec 2023 20:17)  T(F): 97.9 (28 Dec 2023 08:00), Max: 98.8 (27 Dec 2023 20:17)  HR: 65 (28 Dec 2023 09:23) (54 - 94)  BP: 126/84 (28 Dec 2023 08:00) (117/79 - 142/72)  BP(mean): --  RR: 18 (28 Dec 2023 08:00) (16 - 22)  SpO2: 95% (28 Dec 2023 09:23) (92% - 97%)    Parameters below as of 28 Dec 2023 09:23  Patient On (Oxygen Delivery Method): room air      General: Patient obese W M comfortable in NAD  HEENT: NCAT, mmm, EOMI  Neck: no JVD, no carotid bruits  CVS: nl s1, split s2, no s3, no s4, no murmur or rubs, RRR  Chest: Hypoaerated No wheezes, rhonchi, CTA b/l  Abdomen: soft, obese nt/nd  Extremities: No c/c/e  Neuro: A&O x3  Psych: Normal affect      ECG:  NSR with LAE, No acute ST or T wave abn      LABS:                        13.6   24.29 )-----------( 239      ( 28 Dec 2023 06:32 )             40.7     12-28    131<L>  |  93<L>  |  25.6<H>  ----------------------------<  128<H>  4.5   |  26.0  |  1.18    Ca    8.9      28 Dec 2023 06:32  Phos  3.1     12-28  Mg     2.3     12-28    TPro  6.9  /  Alb  3.9  /  TBili  0.5  /  DBili  x   /  AST  25  /  ALT  32  /  AlkPhos  96  12-28      PT/INR - ( 27 Dec 2023 14:25 )   PT: 10.9 sec;   INR: 0.98 ratio       PTT - ( 27 Dec 2023 14:25 )  PTT:30.5 sec      Troponin 53, 45      Assessment:    67 y/o Male with PMHx HTN, HLD, Obesity, COPD. MARY with ROBERTS, cough, Fever, Inc WBC and mildly increased troponin.   No CP  ECG non ischemic  Most likely mild demand ischemia in the face of COPD exacerbation    Plan:  1.Rx as you are for the underlying pulm Process  2. Repeat ECG and tend troponin  3. Do not see indication for any further cardiac testing at this time  Will follow with you         KIMBERLY PALUMBO  [id number]    CC:  Patient is a 68y old  Male who presents with a chief complaint of SOB, cough, fever    HPI:  69 y/o M PMH MARY, COPD (not on home O2), HTN, HLD sent to ER from pulmonologists office (Dr. Mas) with cough/ROBERTS x 1 week and worsening last x2 days. In ER, was reportedly briefly hypoxic on RA, labs significant for wbc 19.   At time of evaluation, pt denies fevers, chills, cp, abd pain, n/v/c/d, dysuria, sick contacts  (27 Dec 2023 17:32)    ALLERGIES:    Levaquin (Unknown)      PAST MEDICAL & SURGICAL HISTORY:  HTN (hypertension)  MARY on CPAP  Carotid atherosclerosis  Non obstructive CAD  Anxiety  Obese  Hyperlipidemia  H/O right inguinal hernia repair      MEDICATIONS:  MEDICATIONS  (STANDING):  albuterol    90 MICROgram(s) HFA Inhaler 2 Puff(s) Inhalation every 6 hours  albuterol/ipratropium for Nebulization 3 milliLiter(s) Nebulizer every 6 hours  aspirin enteric coated 81 milliGRAM(s) Oral daily  atorvastatin 40 milliGRAM(s) Oral at bedtime  azithromycin  IVPB      azithromycin  IVPB 500 milliGRAM(s) IV Intermittent every 24 hours  cefTRIAXone Injectable. 1000 milliGRAM(s) IV Push every 24 hours  dextrose 5%. 1000 milliLiter(s) (50 mL/Hr) IV Continuous <Continuous>  dextrose 5%. 1000 milliLiter(s) (100 mL/Hr) IV Continuous <Continuous>  dextrose 50% Injectable 12.5 Gram(s) IV Push once  dextrose 50% Injectable 25 Gram(s) IV Push once  dextrose 50% Injectable 25 Gram(s) IV Push once  enoxaparin Injectable 40 milliGRAM(s) SubCutaneous every 24 hours  glucagon  Injectable 1 milliGRAM(s) IntraMuscular once  insulin lispro (ADMELOG) corrective regimen sliding scale   SubCutaneous at bedtime  insulin lispro (ADMELOG) corrective regimen sliding scale   SubCutaneous three times a day before meals  methylPREDNISolone sodium succinate Injectable 40 milliGRAM(s) IV Push every 12 hours  metoprolol succinate ER 50 milliGRAM(s) Oral daily  sertraline 100 milliGRAM(s) Oral daily  tiotropium 2.5 MICROgram(s) Inhaler 2 Puff(s) Inhalation daily  valsartan 160 milliGRAM(s) Oral daily    MEDICATIONS  (PRN):  acetaminophen     Tablet .. 650 milliGRAM(s) Oral every 6 hours PRN Temp greater or equal to 38C (100.4F), Mild Pain (1 - 3)  aluminum hydroxide/magnesium hydroxide/simethicone Suspension 30 milliLiter(s) Oral every 4 hours PRN Dyspepsia  benzonatate 100 milliGRAM(s) Oral every 8 hours PRN Cough  dextrose Oral Gel 15 Gram(s) Oral once PRN Blood Glucose LESS THAN 70 milliGRAM(s)/deciliter  guaifenesin/dextromethorphan Oral Liquid 10 milliLiter(s) Oral every 4 hours PRN Cough  melatonin 3 milliGRAM(s) Oral at bedtime PRN Insomnia  ondansetron Injectable 4 milliGRAM(s) IV Push every 8 hours PRN Nausea and/or Vomiting    acetaminophen     Tablet .. 650 milliGRAM(s) Oral every 6 hours PRN  albuterol    90 MICROgram(s) HFA Inhaler 2 Puff(s) Inhalation every 6 hours  albuterol/ipratropium for Nebulization 3 milliLiter(s) Nebulizer every 6 hours  aluminum hydroxide/magnesium hydroxide/simethicone Suspension 30 milliLiter(s) Oral every 4 hours PRN  aspirin enteric coated 81 milliGRAM(s) Oral daily  atorvastatin 40 milliGRAM(s) Oral at bedtime  azithromycin  IVPB      azithromycin  IVPB 500 milliGRAM(s) IV Intermittent every 24 hours  benzonatate 100 milliGRAM(s) Oral every 8 hours PRN  cefTRIAXone Injectable. 1000 milliGRAM(s) IV Push every 24 hours  dextrose 5%. 1000 milliLiter(s) IV Continuous <Continuous>  dextrose 5%. 1000 milliLiter(s) IV Continuous <Continuous>  dextrose 50% Injectable 12.5 Gram(s) IV Push once  dextrose 50% Injectable 25 Gram(s) IV Push once  dextrose 50% Injectable 25 Gram(s) IV Push once  dextrose Oral Gel 15 Gram(s) Oral once PRN  enoxaparin Injectable 40 milliGRAM(s) SubCutaneous every 24 hours  glucagon  Injectable 1 milliGRAM(s) IntraMuscular once  guaifenesin/dextromethorphan Oral Liquid 10 milliLiter(s) Oral every 4 hours PRN  insulin lispro (ADMELOG) corrective regimen sliding scale   SubCutaneous at bedtime  insulin lispro (ADMELOG) corrective regimen sliding scale   SubCutaneous three times a day before meals  melatonin 3 milliGRAM(s) Oral at bedtime PRN  methylPREDNISolone sodium succinate Injectable 40 milliGRAM(s) IV Push every 12 hours  metoprolol succinate ER 50 milliGRAM(s) Oral daily  ondansetron Injectable 4 milliGRAM(s) IV Push every 8 hours PRN  sertraline 100 milliGRAM(s) Oral daily  tiotropium 2.5 MICROgram(s) Inhaler 2 Puff(s) Inhalation daily  valsartan 160 milliGRAM(s) Oral daily      SOCIAL HISTORY:  Patient denies alcohol, tobacco, drug use    FAMILY HISTORY:  FH: diabetes mellitus  sister    FH: COPD (chronic obstructive pulmonary disease)  father    ROS:  Other than as stated above 13 pointl ROS is unremarkable      PHYSICAL EXAM:  Vital Signs Last 24 Hrs  T(C): 36.6 (28 Dec 2023 08:00), Max: 37.1 (27 Dec 2023 20:17)  T(F): 97.9 (28 Dec 2023 08:00), Max: 98.8 (27 Dec 2023 20:17)  HR: 65 (28 Dec 2023 09:23) (54 - 94)  BP: 126/84 (28 Dec 2023 08:00) (117/79 - 142/72)  BP(mean): --  RR: 18 (28 Dec 2023 08:00) (16 - 22)  SpO2: 95% (28 Dec 2023 09:23) (92% - 97%)    Parameters below as of 28 Dec 2023 09:23  Patient On (Oxygen Delivery Method): room air      General: Patient obese W M comfortable in NAD  HEENT: NCAT, mmm, EOMI  Neck: no JVD, no carotid bruits  CVS: nl s1, split s2, no s3, no s4, no murmur or rubs, RRR  Chest: Hypoaerated No wheezes, rhonchi, CTA b/l  Abdomen: soft, obese nt/nd  Extremities: No c/c/e  Neuro: A&O x3  Psych: Normal affect      ECG:  NSR with LAE, No acute ST or T wave abn      LABS:                        13.6   24.29 )-----------( 239      ( 28 Dec 2023 06:32 )             40.7     12-28    131<L>  |  93<L>  |  25.6<H>  ----------------------------<  128<H>  4.5   |  26.0  |  1.18    Ca    8.9      28 Dec 2023 06:32  Phos  3.1     12-28  Mg     2.3     12-28    TPro  6.9  /  Alb  3.9  /  TBili  0.5  /  DBili  x   /  AST  25  /  ALT  32  /  AlkPhos  96  12-28      PT/INR - ( 27 Dec 2023 14:25 )   PT: 10.9 sec;   INR: 0.98 ratio       PTT - ( 27 Dec 2023 14:25 )  PTT:30.5 sec      Troponin 53, 45      Assessment:    69 y/o Male with PMHx HTN, HLD, Obesity, COPD. MARY with ROBERTS, cough, Fever, Inc WBC and mildly increased troponin.   No CP  ECG non ischemic  Most likely mild demand ischemia in the face of COPD exacerbation    Plan:  1.Rx as you are for the underlying pulm Process  2. Repeat ECG and tend troponin  3. Do not see indication for any further cardiac testing at this time  Will follow with you

## 2023-12-28 NOTE — PROGRESS NOTE ADULT - SUBJECTIVE AND OBJECTIVE BOX
KIMBERLY PALUMBO    [id number]    68y      Male    CC: copd    INTERVAL HPI/OVERNIGHT EVENTS: pt seen and examined.     REVIEW OF SYSTEMS:    CONSTITUTIONAL: No fever, weight loss, or fatigue  RESPIRATORY: No cough, wheezing, hemoptysis; No shortness of breath  CARDIOVASCULAR: No chest pain, palpitations  GASTROINTESTINAL: No abdominal or epigastric pain. No nausea, vomiting    Vital Signs Last 24 Hrs  T(C): 36.8 (28 Dec 2023 12:00), Max: 37.1 (27 Dec 2023 20:17)  T(F): 98.2 (28 Dec 2023 12:00), Max: 98.8 (27 Dec 2023 20:17)  HR: 84 (28 Dec 2023 12:00) (54 - 89)  BP: 104/69 (28 Dec 2023 12:00) (104/69 - 142/72)  BP(mean): --  RR: 18 (28 Dec 2023 12:00) (18 - 20)  SpO2: 92% (28 Dec 2023 12:00) (92% - 97%)    Parameters below as of 28 Dec 2023 12:00  Patient On (Oxygen Delivery Method): room air        PHYSICAL EXAM:    CONSTITUTIONAL:  NAD  CARDIAC: Regular rate, regular rhythm.  normal +S1, S2.    RESPIRATORY:  course sounds b/l; air entry improved from prior exam; respirations unlabored on RA   GASTROINTESTINAL: Abdomen soft, non-tender, no guarding.  NEUROLOGICAL: Alert and oriented, grossly non-focal   SKIN: warm, dry  PSYCHIATRIC: calm, cooperative    LABS:                        13.6   24.29 )-----------( 239      ( 28 Dec 2023 06:32 )             40.7     12-28    131<L>  |  93<L>  |  25.6<H>  ----------------------------<  128<H>  4.5   |  26.0  |  1.18    Ca    8.9      28 Dec 2023 06:32  Phos  3.1     12-28  Mg     2.3     12-28    TPro  6.9  /  Alb  3.9  /  TBili  0.5  /  DBili  x   /  AST  25  /  ALT  32  /  AlkPhos  96  12-28    PT/INR - ( 27 Dec 2023 14:25 )   PT: 10.9 sec;   INR: 0.98 ratio         PTT - ( 27 Dec 2023 14:25 )  PTT:30.5 sec  Urinalysis Basic - ( 28 Dec 2023 06:32 )    Color: x / Appearance: x / SG: x / pH: x  Gluc: 128 mg/dL / Ketone: x  / Bili: x / Urobili: x   Blood: x / Protein: x / Nitrite: x   Leuk Esterase: x / RBC: x / WBC x   Sq Epi: x / Non Sq Epi: x / Bacteria: x          MEDICATIONS  (STANDING):  albuterol    90 MICROgram(s) HFA Inhaler 2 Puff(s) Inhalation every 6 hours  albuterol/ipratropium for Nebulization 3 milliLiter(s) Nebulizer every 6 hours  aspirin enteric coated 81 milliGRAM(s) Oral daily  atorvastatin 40 milliGRAM(s) Oral at bedtime  azithromycin  IVPB      azithromycin  IVPB 500 milliGRAM(s) IV Intermittent every 24 hours  cefTRIAXone Injectable. 1000 milliGRAM(s) IV Push every 24 hours  dextrose 5%. 1000 milliLiter(s) (50 mL/Hr) IV Continuous <Continuous>  dextrose 5%. 1000 milliLiter(s) (100 mL/Hr) IV Continuous <Continuous>  dextrose 50% Injectable 12.5 Gram(s) IV Push once  dextrose 50% Injectable 25 Gram(s) IV Push once  dextrose 50% Injectable 25 Gram(s) IV Push once  enoxaparin Injectable 40 milliGRAM(s) SubCutaneous every 24 hours  glucagon  Injectable 1 milliGRAM(s) IntraMuscular once  insulin lispro (ADMELOG) corrective regimen sliding scale   SubCutaneous at bedtime  insulin lispro (ADMELOG) corrective regimen sliding scale   SubCutaneous three times a day before meals  methylPREDNISolone sodium succinate Injectable 40 milliGRAM(s) IV Push every 12 hours  metoprolol succinate ER 50 milliGRAM(s) Oral daily  sertraline 100 milliGRAM(s) Oral daily  tiotropium 2.5 MICROgram(s) Inhaler 2 Puff(s) Inhalation daily  valsartan 160 milliGRAM(s) Oral daily    MEDICATIONS  (PRN):  acetaminophen     Tablet .. 650 milliGRAM(s) Oral every 6 hours PRN Temp greater or equal to 38C (100.4F), Mild Pain (1 - 3)  aluminum hydroxide/magnesium hydroxide/simethicone Suspension 30 milliLiter(s) Oral every 4 hours PRN Dyspepsia  benzonatate 100 milliGRAM(s) Oral every 8 hours PRN Cough  dextrose Oral Gel 15 Gram(s) Oral once PRN Blood Glucose LESS THAN 70 milliGRAM(s)/deciliter  guaifenesin/dextromethorphan Oral Liquid 10 milliLiter(s) Oral every 4 hours PRN Cough  melatonin 3 milliGRAM(s) Oral at bedtime PRN Insomnia  ondansetron Injectable 4 milliGRAM(s) IV Push every 8 hours PRN Nausea and/or Vomiting      RADIOLOGY & ADDITIONAL TESTS:   KIMBERLY PALUMBO    [id number]    68y      Male    CC: copd    INTERVAL HPI/OVERNIGHT EVENTS: pt seen and examined.     REVIEW OF SYSTEMS:    CONSTITUTIONAL: No fever, weight loss, or fatigue  RESPIRATORY: No cough, wheezing, hemoptysis; No shortness of breath  CARDIOVASCULAR: No chest pain, palpitations  GASTROINTESTINAL: No abdominal or epigastric pain. No nausea, vomiting    Vital Signs Last 24 Hrs  T(C): 36.8 (28 Dec 2023 12:00), Max: 37.1 (27 Dec 2023 20:17)  T(F): 98.2 (28 Dec 2023 12:00), Max: 98.8 (27 Dec 2023 20:17)  HR: 84 (28 Dec 2023 12:00) (54 - 89)  BP: 104/69 (28 Dec 2023 12:00) (104/69 - 142/72)  BP(mean): --  RR: 18 (28 Dec 2023 12:00) (18 - 20)  SpO2: 92% (28 Dec 2023 12:00) (92% - 97%)    Parameters below as of 28 Dec 2023 12:00  Patient On (Oxygen Delivery Method): room air        PHYSICAL EXAM:    CONSTITUTIONAL:  NAD  CARDIAC: Regular rate, regular rhythm.  normal +S1, S2.    RESPIRATORY:  course sounds b/l; air entry improved from prior exam; respirations unlabored on RA   GASTROINTESTINAL: Abdomen soft, non-tender, no guarding.  NEUROLOGICAL: Alert and oriented, grossly non-focal   SKIN: warm, dry  PSYCHIATRIC: calm, cooperative    LABS:                        13.6   24.29 )-----------( 239      ( 28 Dec 2023 06:32 )             40.7     12-28    131<L>  |  93<L>  |  25.6<H>  ----------------------------<  128<H>  4.5   |  26.0  |  1.18    Ca    8.9      28 Dec 2023 06:32  Phos  3.1     12-28  Mg     2.3     12-28    TPro  6.9  /  Alb  3.9  /  TBili  0.5  /  DBili  x   /  AST  25  /  ALT  32  /  AlkPhos  96  12-28    PT/INR - ( 27 Dec 2023 14:25 )   PT: 10.9 sec;   INR: 0.98 ratio         PTT - ( 27 Dec 2023 14:25 )  PTT:30.5 sec  Urinalysis Basic - ( 28 Dec 2023 06:32 )    Color: x / Appearance: x / SG: x / pH: x  Gluc: 128 mg/dL / Ketone: x  / Bili: x / Urobili: x   Blood: x / Protein: x / Nitrite: x   Leuk Esterase: x / RBC: x / WBC x   Sq Epi: x / Non Sq Epi: x / Bacteria: x          MEDICATIONS  (STANDING):  albuterol    90 MICROgram(s) HFA Inhaler 2 Puff(s) Inhalation every 6 hours  albuterol/ipratropium for Nebulization 3 milliLiter(s) Nebulizer every 6 hours  aspirin enteric coated 81 milliGRAM(s) Oral daily  atorvastatin 40 milliGRAM(s) Oral at bedtime  azithromycin  IVPB      azithromycin  IVPB 500 milliGRAM(s) IV Intermittent every 24 hours  cefTRIAXone Injectable. 1000 milliGRAM(s) IV Push every 24 hours  dextrose 5%. 1000 milliLiter(s) (50 mL/Hr) IV Continuous <Continuous>  dextrose 5%. 1000 milliLiter(s) (100 mL/Hr) IV Continuous <Continuous>  dextrose 50% Injectable 12.5 Gram(s) IV Push once  dextrose 50% Injectable 25 Gram(s) IV Push once  dextrose 50% Injectable 25 Gram(s) IV Push once  enoxaparin Injectable 40 milliGRAM(s) SubCutaneous every 24 hours  glucagon  Injectable 1 milliGRAM(s) IntraMuscular once  insulin lispro (ADMELOG) corrective regimen sliding scale   SubCutaneous at bedtime  insulin lispro (ADMELOG) corrective regimen sliding scale   SubCutaneous three times a day before meals  methylPREDNISolone sodium succinate Injectable 40 milliGRAM(s) IV Push every 12 hours  metoprolol succinate ER 50 milliGRAM(s) Oral daily  sertraline 100 milliGRAM(s) Oral daily  tiotropium 2.5 MICROgram(s) Inhaler 2 Puff(s) Inhalation daily  valsartan 160 milliGRAM(s) Oral daily    MEDICATIONS  (PRN):  acetaminophen     Tablet .. 650 milliGRAM(s) Oral every 6 hours PRN Temp greater or equal to 38C (100.4F), Mild Pain (1 - 3)  aluminum hydroxide/magnesium hydroxide/simethicone Suspension 30 milliLiter(s) Oral every 4 hours PRN Dyspepsia  benzonatate 100 milliGRAM(s) Oral every 8 hours PRN Cough  dextrose Oral Gel 15 Gram(s) Oral once PRN Blood Glucose LESS THAN 70 milliGRAM(s)/deciliter  guaifenesin/dextromethorphan Oral Liquid 10 milliLiter(s) Oral every 4 hours PRN Cough  melatonin 3 milliGRAM(s) Oral at bedtime PRN Insomnia  ondansetron Injectable 4 milliGRAM(s) IV Push every 8 hours PRN Nausea and/or Vomiting      RADIOLOGY & ADDITIONAL TESTS:

## 2023-12-29 ENCOUNTER — TRANSCRIPTION ENCOUNTER (OUTPATIENT)
Age: 68
End: 2023-12-29

## 2023-12-29 LAB
ANION GAP SERPL CALC-SCNC: 12 MMOL/L — SIGNIFICANT CHANGE UP (ref 5–17)
ANION GAP SERPL CALC-SCNC: 12 MMOL/L — SIGNIFICANT CHANGE UP (ref 5–17)
BASOPHILS # BLD AUTO: 0.01 K/UL — SIGNIFICANT CHANGE UP (ref 0–0.2)
BASOPHILS # BLD AUTO: 0.01 K/UL — SIGNIFICANT CHANGE UP (ref 0–0.2)
BASOPHILS NFR BLD AUTO: 0.1 % — SIGNIFICANT CHANGE UP (ref 0–2)
BASOPHILS NFR BLD AUTO: 0.1 % — SIGNIFICANT CHANGE UP (ref 0–2)
BUN SERPL-MCNC: 34.6 MG/DL — HIGH (ref 8–20)
BUN SERPL-MCNC: 34.6 MG/DL — HIGH (ref 8–20)
CALCIUM SERPL-MCNC: 8.8 MG/DL — SIGNIFICANT CHANGE UP (ref 8.4–10.5)
CALCIUM SERPL-MCNC: 8.8 MG/DL — SIGNIFICANT CHANGE UP (ref 8.4–10.5)
CHLORIDE SERPL-SCNC: 95 MMOL/L — LOW (ref 96–108)
CHLORIDE SERPL-SCNC: 95 MMOL/L — LOW (ref 96–108)
CO2 SERPL-SCNC: 26 MMOL/L — SIGNIFICANT CHANGE UP (ref 22–29)
CO2 SERPL-SCNC: 26 MMOL/L — SIGNIFICANT CHANGE UP (ref 22–29)
CREAT SERPL-MCNC: 1.16 MG/DL — SIGNIFICANT CHANGE UP (ref 0.5–1.3)
CREAT SERPL-MCNC: 1.16 MG/DL — SIGNIFICANT CHANGE UP (ref 0.5–1.3)
EGFR: 68 ML/MIN/1.73M2 — SIGNIFICANT CHANGE UP
EGFR: 68 ML/MIN/1.73M2 — SIGNIFICANT CHANGE UP
EOSINOPHIL # BLD AUTO: 0 K/UL — SIGNIFICANT CHANGE UP (ref 0–0.5)
EOSINOPHIL # BLD AUTO: 0 K/UL — SIGNIFICANT CHANGE UP (ref 0–0.5)
EOSINOPHIL NFR BLD AUTO: 0 % — SIGNIFICANT CHANGE UP (ref 0–6)
EOSINOPHIL NFR BLD AUTO: 0 % — SIGNIFICANT CHANGE UP (ref 0–6)
GLUCOSE BLDC GLUCOMTR-MCNC: 107 MG/DL — HIGH (ref 70–99)
GLUCOSE BLDC GLUCOMTR-MCNC: 107 MG/DL — HIGH (ref 70–99)
GLUCOSE BLDC GLUCOMTR-MCNC: 121 MG/DL — HIGH (ref 70–99)
GLUCOSE BLDC GLUCOMTR-MCNC: 121 MG/DL — HIGH (ref 70–99)
GLUCOSE BLDC GLUCOMTR-MCNC: 130 MG/DL — HIGH (ref 70–99)
GLUCOSE BLDC GLUCOMTR-MCNC: 130 MG/DL — HIGH (ref 70–99)
GLUCOSE BLDC GLUCOMTR-MCNC: 154 MG/DL — HIGH (ref 70–99)
GLUCOSE BLDC GLUCOMTR-MCNC: 154 MG/DL — HIGH (ref 70–99)
GLUCOSE SERPL-MCNC: 155 MG/DL — HIGH (ref 70–99)
GLUCOSE SERPL-MCNC: 155 MG/DL — HIGH (ref 70–99)
HCT VFR BLD CALC: 40.7 % — SIGNIFICANT CHANGE UP (ref 39–50)
HCT VFR BLD CALC: 40.7 % — SIGNIFICANT CHANGE UP (ref 39–50)
HGB BLD-MCNC: 13.5 G/DL — SIGNIFICANT CHANGE UP (ref 13–17)
HGB BLD-MCNC: 13.5 G/DL — SIGNIFICANT CHANGE UP (ref 13–17)
IMM GRANULOCYTES NFR BLD AUTO: 0.6 % — SIGNIFICANT CHANGE UP (ref 0–0.9)
IMM GRANULOCYTES NFR BLD AUTO: 0.6 % — SIGNIFICANT CHANGE UP (ref 0–0.9)
LYMPHOCYTES # BLD AUTO: 1.03 K/UL — SIGNIFICANT CHANGE UP (ref 1–3.3)
LYMPHOCYTES # BLD AUTO: 1.03 K/UL — SIGNIFICANT CHANGE UP (ref 1–3.3)
LYMPHOCYTES # BLD AUTO: 5.9 % — LOW (ref 13–44)
LYMPHOCYTES # BLD AUTO: 5.9 % — LOW (ref 13–44)
MAGNESIUM SERPL-MCNC: 2.4 MG/DL — SIGNIFICANT CHANGE UP (ref 1.6–2.6)
MAGNESIUM SERPL-MCNC: 2.4 MG/DL — SIGNIFICANT CHANGE UP (ref 1.6–2.6)
MCHC RBC-ENTMCNC: 29.1 PG — SIGNIFICANT CHANGE UP (ref 27–34)
MCHC RBC-ENTMCNC: 29.1 PG — SIGNIFICANT CHANGE UP (ref 27–34)
MCHC RBC-ENTMCNC: 33.2 GM/DL — SIGNIFICANT CHANGE UP (ref 32–36)
MCHC RBC-ENTMCNC: 33.2 GM/DL — SIGNIFICANT CHANGE UP (ref 32–36)
MCV RBC AUTO: 87.7 FL — SIGNIFICANT CHANGE UP (ref 80–100)
MCV RBC AUTO: 87.7 FL — SIGNIFICANT CHANGE UP (ref 80–100)
MONOCYTES # BLD AUTO: 0.85 K/UL — SIGNIFICANT CHANGE UP (ref 0–0.9)
MONOCYTES # BLD AUTO: 0.85 K/UL — SIGNIFICANT CHANGE UP (ref 0–0.9)
MONOCYTES NFR BLD AUTO: 4.8 % — SIGNIFICANT CHANGE UP (ref 2–14)
MONOCYTES NFR BLD AUTO: 4.8 % — SIGNIFICANT CHANGE UP (ref 2–14)
NEUTROPHILS # BLD AUTO: 15.57 K/UL — HIGH (ref 1.8–7.4)
NEUTROPHILS # BLD AUTO: 15.57 K/UL — HIGH (ref 1.8–7.4)
NEUTROPHILS NFR BLD AUTO: 88.6 % — HIGH (ref 43–77)
NEUTROPHILS NFR BLD AUTO: 88.6 % — HIGH (ref 43–77)
PLATELET # BLD AUTO: 236 K/UL — SIGNIFICANT CHANGE UP (ref 150–400)
PLATELET # BLD AUTO: 236 K/UL — SIGNIFICANT CHANGE UP (ref 150–400)
POTASSIUM SERPL-MCNC: 4.9 MMOL/L — SIGNIFICANT CHANGE UP (ref 3.5–5.3)
POTASSIUM SERPL-MCNC: 4.9 MMOL/L — SIGNIFICANT CHANGE UP (ref 3.5–5.3)
POTASSIUM SERPL-SCNC: 4.9 MMOL/L — SIGNIFICANT CHANGE UP (ref 3.5–5.3)
POTASSIUM SERPL-SCNC: 4.9 MMOL/L — SIGNIFICANT CHANGE UP (ref 3.5–5.3)
RBC # BLD: 4.64 M/UL — SIGNIFICANT CHANGE UP (ref 4.2–5.8)
RBC # BLD: 4.64 M/UL — SIGNIFICANT CHANGE UP (ref 4.2–5.8)
RBC # FLD: 13.6 % — SIGNIFICANT CHANGE UP (ref 10.3–14.5)
RBC # FLD: 13.6 % — SIGNIFICANT CHANGE UP (ref 10.3–14.5)
SODIUM SERPL-SCNC: 133 MMOL/L — LOW (ref 135–145)
SODIUM SERPL-SCNC: 133 MMOL/L — LOW (ref 135–145)
WBC # BLD: 17.57 K/UL — HIGH (ref 3.8–10.5)
WBC # BLD: 17.57 K/UL — HIGH (ref 3.8–10.5)
WBC # FLD AUTO: 17.57 K/UL — HIGH (ref 3.8–10.5)
WBC # FLD AUTO: 17.57 K/UL — HIGH (ref 3.8–10.5)

## 2023-12-29 PROCEDURE — 99232 SBSQ HOSP IP/OBS MODERATE 35: CPT

## 2023-12-29 RX ADMIN — Medication 40 MILLIGRAM(S): at 06:28

## 2023-12-29 RX ADMIN — SERTRALINE 100 MILLIGRAM(S): 25 TABLET, FILM COATED ORAL at 11:45

## 2023-12-29 RX ADMIN — Medication 3 MILLILITER(S): at 21:47

## 2023-12-29 RX ADMIN — Medication 3 MILLILITER(S): at 03:47

## 2023-12-29 RX ADMIN — VALSARTAN 160 MILLIGRAM(S): 80 TABLET ORAL at 06:27

## 2023-12-29 RX ADMIN — Medication 100 MILLIGRAM(S): at 22:06

## 2023-12-29 RX ADMIN — ENOXAPARIN SODIUM 40 MILLIGRAM(S): 100 INJECTION SUBCUTANEOUS at 22:06

## 2023-12-29 RX ADMIN — Medication 3 MILLILITER(S): at 16:23

## 2023-12-29 RX ADMIN — Medication 3 MILLIGRAM(S): at 22:06

## 2023-12-29 RX ADMIN — ATORVASTATIN CALCIUM 40 MILLIGRAM(S): 80 TABLET, FILM COATED ORAL at 22:06

## 2023-12-29 RX ADMIN — Medication 50 MILLIGRAM(S): at 06:27

## 2023-12-29 RX ADMIN — AZITHROMYCIN 255 MILLIGRAM(S): 500 TABLET, FILM COATED ORAL at 17:18

## 2023-12-29 RX ADMIN — Medication 3 MILLILITER(S): at 09:14

## 2023-12-29 RX ADMIN — CEFTRIAXONE 1000 MILLIGRAM(S): 500 INJECTION, POWDER, FOR SOLUTION INTRAMUSCULAR; INTRAVENOUS at 17:18

## 2023-12-29 RX ADMIN — Medication 1: at 12:43

## 2023-12-29 RX ADMIN — Medication 81 MILLIGRAM(S): at 11:45

## 2023-12-29 NOTE — PROGRESS NOTE ADULT - SUBJECTIVE AND OBJECTIVE BOX
KIMBERLY PALUMBO  [id number]        Chief Complaint: follow up PNA      Subjective:      24 hour Tele:        acetaminophen     Tablet .. 650 milliGRAM(s) Oral every 6 hours PRN  albuterol    90 MICROgram(s) HFA Inhaler 2 Puff(s) Inhalation every 6 hours  albuterol/ipratropium for Nebulization 3 milliLiter(s) Nebulizer every 6 hours  aluminum hydroxide/magnesium hydroxide/simethicone Suspension 30 milliLiter(s) Oral every 4 hours PRN  aspirin enteric coated 81 milliGRAM(s) Oral daily  atorvastatin 40 milliGRAM(s) Oral at bedtime  azithromycin  IVPB      azithromycin  IVPB 500 milliGRAM(s) IV Intermittent every 24 hours  benzonatate 100 milliGRAM(s) Oral every 8 hours PRN  cefTRIAXone Injectable. 1000 milliGRAM(s) IV Push every 24 hours  dextrose 5%. 1000 milliLiter(s) IV Continuous <Continuous>  dextrose 5%. 1000 milliLiter(s) IV Continuous <Continuous>  dextrose 50% Injectable 12.5 Gram(s) IV Push once  dextrose 50% Injectable 25 Gram(s) IV Push once  dextrose 50% Injectable 25 Gram(s) IV Push once  dextrose Oral Gel 15 Gram(s) Oral once PRN  enoxaparin Injectable 40 milliGRAM(s) SubCutaneous every 24 hours  glucagon  Injectable 1 milliGRAM(s) IntraMuscular once  guaifenesin/dextromethorphan Oral Liquid 10 milliLiter(s) Oral every 4 hours PRN  insulin lispro (ADMELOG) corrective regimen sliding scale   SubCutaneous at bedtime  insulin lispro (ADMELOG) corrective regimen sliding scale   SubCutaneous three times a day before meals  melatonin 3 milliGRAM(s) Oral at bedtime PRN  methylPREDNISolone sodium succinate Injectable 40 milliGRAM(s) IV Push every 12 hours  metoprolol succinate ER 50 milliGRAM(s) Oral daily  ondansetron Injectable 4 milliGRAM(s) IV Push every 8 hours PRN  sertraline 100 milliGRAM(s) Oral daily  tiotropium 2.5 MICROgram(s) Inhaler 2 Puff(s) Inhalation daily  valsartan 160 milliGRAM(s) Oral daily          Physical Exam:  T(C): 36.4 (12-29-23 @ 04:28), Max: 37 (12-28-23 @ 20:05)  HR: 66 (12-29-23 @ 04:28) (58 - 107)  BP: 136/83 (12-29-23 @ 04:28) (104/69 - 136/83)  RR: 18 (12-29-23 @ 04:28) (18 - 18)  SpO2: 96% (12-29-23 @ 04:28) (92% - 96%)  Wt(kg): --  General: Comfortable in NAD  HEENT: MMM, sclera anicteric  Resp: reduced airmovement  CVS: soft s1s2, rrr, no s3/JVD  Abd: soft, NT, ND, obese  Ext: No c/c/e  Neuro A&O x3  Psych: Normal affect    I&O's Summary    28 Dec 2023 07:01  -  29 Dec 2023 07:00  --------------------------------------------------------  IN: 0 mL / OUT: 150 mL / NET: -150 mL          Labs:   29 Dec 2023 06:00    133    |  95     |  34.6   ----------------------------<  155    4.9     |  26.0   |  1.16     Ca    8.8        29 Dec 2023 06:00  Phos  3.1       28 Dec 2023 06:32  Mg     2.4       29 Dec 2023 06:00    TPro  6.9    /  Alb  3.9    /  TBili  0.5    /  DBili  x      /  AST  25     /  ALT  32     /  AlkPhos  96     28 Dec 2023 06:32                          13.5   17.57 )-----------( 236      ( 29 Dec 2023 06:00 )             40.7     PT/INR - ( 27 Dec 2023 14:25 )   PT: 10.9 sec;   INR: 0.98 ratio         PTT - ( 27 Dec 2023 14:25 )  PTT:30.5 sec  Troponin 53, 45    PHARM NUCLEAR STRESS TEST 1/2021  1. Negative for ischemia/infarct, EF 62%  2. BP hypertensive at baseline, normal response  ?    ECHO 9/2022:  1. Mild LVH, EF 55-60%  2. Grade I diastolic dysfunction  3. Normal RV/LA/RA  4. Trivial AI  5. Borderline dilated aortic root/ascending aorta  ?    ECG: SR, no ST-T abnormalities     Assessment:  69 y/o Male with PMHx HTN, HLD, Obesity, COPD. MARY with ROBERTS, cough, Fever, Inc WBC and mildly increased troponin.   -No CP and ECG non ischemic  -Most likely mild demand ischemia in the face of COPD exacerbation, Will consider outpatient ischemic evaluation once recovered     Plan: (TO BE SEEN)   1. COPD management per primary team and antibiotics for possible PNA  2. No further inpatient cardiac work up  3.     Scar Diane MD KIMBERLY PALUMBO  [id number]        Chief Complaint: follow up PNA      Subjective:      24 hour Tele:        acetaminophen     Tablet .. 650 milliGRAM(s) Oral every 6 hours PRN  albuterol    90 MICROgram(s) HFA Inhaler 2 Puff(s) Inhalation every 6 hours  albuterol/ipratropium for Nebulization 3 milliLiter(s) Nebulizer every 6 hours  aluminum hydroxide/magnesium hydroxide/simethicone Suspension 30 milliLiter(s) Oral every 4 hours PRN  aspirin enteric coated 81 milliGRAM(s) Oral daily  atorvastatin 40 milliGRAM(s) Oral at bedtime  azithromycin  IVPB      azithromycin  IVPB 500 milliGRAM(s) IV Intermittent every 24 hours  benzonatate 100 milliGRAM(s) Oral every 8 hours PRN  cefTRIAXone Injectable. 1000 milliGRAM(s) IV Push every 24 hours  dextrose 5%. 1000 milliLiter(s) IV Continuous <Continuous>  dextrose 5%. 1000 milliLiter(s) IV Continuous <Continuous>  dextrose 50% Injectable 12.5 Gram(s) IV Push once  dextrose 50% Injectable 25 Gram(s) IV Push once  dextrose 50% Injectable 25 Gram(s) IV Push once  dextrose Oral Gel 15 Gram(s) Oral once PRN  enoxaparin Injectable 40 milliGRAM(s) SubCutaneous every 24 hours  glucagon  Injectable 1 milliGRAM(s) IntraMuscular once  guaifenesin/dextromethorphan Oral Liquid 10 milliLiter(s) Oral every 4 hours PRN  insulin lispro (ADMELOG) corrective regimen sliding scale   SubCutaneous at bedtime  insulin lispro (ADMELOG) corrective regimen sliding scale   SubCutaneous three times a day before meals  melatonin 3 milliGRAM(s) Oral at bedtime PRN  methylPREDNISolone sodium succinate Injectable 40 milliGRAM(s) IV Push every 12 hours  metoprolol succinate ER 50 milliGRAM(s) Oral daily  ondansetron Injectable 4 milliGRAM(s) IV Push every 8 hours PRN  sertraline 100 milliGRAM(s) Oral daily  tiotropium 2.5 MICROgram(s) Inhaler 2 Puff(s) Inhalation daily  valsartan 160 milliGRAM(s) Oral daily          Physical Exam:  T(C): 36.4 (12-29-23 @ 04:28), Max: 37 (12-28-23 @ 20:05)  HR: 66 (12-29-23 @ 04:28) (58 - 107)  BP: 136/83 (12-29-23 @ 04:28) (104/69 - 136/83)  RR: 18 (12-29-23 @ 04:28) (18 - 18)  SpO2: 96% (12-29-23 @ 04:28) (92% - 96%)  Wt(kg): --  General: Comfortable in NAD  HEENT: MMM, sclera anicteric  Resp: reduced airmovement  CVS: soft s1s2, rrr, no s3/JVD  Abd: soft, NT, ND, obese  Ext: No c/c/e  Neuro A&O x3  Psych: Normal affect    I&O's Summary    28 Dec 2023 07:01  -  29 Dec 2023 07:00  --------------------------------------------------------  IN: 0 mL / OUT: 150 mL / NET: -150 mL          Labs:   29 Dec 2023 06:00    133    |  95     |  34.6   ----------------------------<  155    4.9     |  26.0   |  1.16     Ca    8.8        29 Dec 2023 06:00  Phos  3.1       28 Dec 2023 06:32  Mg     2.4       29 Dec 2023 06:00    TPro  6.9    /  Alb  3.9    /  TBili  0.5    /  DBili  x      /  AST  25     /  ALT  32     /  AlkPhos  96     28 Dec 2023 06:32                          13.5   17.57 )-----------( 236      ( 29 Dec 2023 06:00 )             40.7     PT/INR - ( 27 Dec 2023 14:25 )   PT: 10.9 sec;   INR: 0.98 ratio         PTT - ( 27 Dec 2023 14:25 )  PTT:30.5 sec  Troponin 53, 45    PHARM NUCLEAR STRESS TEST 1/2021  1. Negative for ischemia/infarct, EF 62%  2. BP hypertensive at baseline, normal response  ?    ECHO 9/2022:  1. Mild LVH, EF 55-60%  2. Grade I diastolic dysfunction  3. Normal RV/LA/RA  4. Trivial AI  5. Borderline dilated aortic root/ascending aorta  ?    ECG: SR, no ST-T abnormalities     Assessment:  69 y/o Male with PMHx HTN, HLD, Obesity, COPD. MARY with ROBERTS, cough, Fever, Inc WBC and mildly increased troponin.   -No CP and ECG non ischemic  -Most likely mild demand ischemia in the face of COPD exacerbation, Will consider outpatient ischemic evaluation once recovered     Plan: (TO BE SEEN)   1. COPD management per primary team and antibiotics for possible PNA  2. No further inpatient cardiac work up  3.     Scar Diane MD KIMBERLY PALUMBO  [id number]        Chief Complaint: follow up PNA      Subjective: still dyspneic      24 hour Tele: SR        acetaminophen     Tablet .. 650 milliGRAM(s) Oral every 6 hours PRN  albuterol    90 MICROgram(s) HFA Inhaler 2 Puff(s) Inhalation every 6 hours  albuterol/ipratropium for Nebulization 3 milliLiter(s) Nebulizer every 6 hours  aluminum hydroxide/magnesium hydroxide/simethicone Suspension 30 milliLiter(s) Oral every 4 hours PRN  aspirin enteric coated 81 milliGRAM(s) Oral daily  atorvastatin 40 milliGRAM(s) Oral at bedtime  azithromycin  IVPB      azithromycin  IVPB 500 milliGRAM(s) IV Intermittent every 24 hours  benzonatate 100 milliGRAM(s) Oral every 8 hours PRN  cefTRIAXone Injectable. 1000 milliGRAM(s) IV Push every 24 hours  dextrose 5%. 1000 milliLiter(s) IV Continuous <Continuous>  dextrose 5%. 1000 milliLiter(s) IV Continuous <Continuous>  dextrose 50% Injectable 12.5 Gram(s) IV Push once  dextrose 50% Injectable 25 Gram(s) IV Push once  dextrose 50% Injectable 25 Gram(s) IV Push once  dextrose Oral Gel 15 Gram(s) Oral once PRN  enoxaparin Injectable 40 milliGRAM(s) SubCutaneous every 24 hours  glucagon  Injectable 1 milliGRAM(s) IntraMuscular once  guaifenesin/dextromethorphan Oral Liquid 10 milliLiter(s) Oral every 4 hours PRN  insulin lispro (ADMELOG) corrective regimen sliding scale   SubCutaneous at bedtime  insulin lispro (ADMELOG) corrective regimen sliding scale   SubCutaneous three times a day before meals  melatonin 3 milliGRAM(s) Oral at bedtime PRN  methylPREDNISolone sodium succinate Injectable 40 milliGRAM(s) IV Push every 12 hours  metoprolol succinate ER 50 milliGRAM(s) Oral daily  ondansetron Injectable 4 milliGRAM(s) IV Push every 8 hours PRN  sertraline 100 milliGRAM(s) Oral daily  tiotropium 2.5 MICROgram(s) Inhaler 2 Puff(s) Inhalation daily  valsartan 160 milliGRAM(s) Oral daily          Physical Exam:  T(C): 36.4 (12-29-23 @ 04:28), Max: 37 (12-28-23 @ 20:05)  HR: 66 (12-29-23 @ 04:28) (58 - 107)  BP: 136/83 (12-29-23 @ 04:28) (104/69 - 136/83)  RR: 18 (12-29-23 @ 04:28) (18 - 18)  SpO2: 96% (12-29-23 @ 04:28) (92% - 96%)  Wt(kg): --  General: Comfortable in NAD  HEENT: MMM, sclera anicteric  Resp: reduced air movement  CVS: soft s1s2, rrr, no s3/JVD  Abd: soft, NT, ND, obese  Ext: No c/c/e  Neuro A&O x3  Psych: Normal affect    I&O's Summary    28 Dec 2023 07:01  -  29 Dec 2023 07:00  --------------------------------------------------------  IN: 0 mL / OUT: 150 mL / NET: -150 mL          Labs:   29 Dec 2023 06:00    133    |  95     |  34.6   ----------------------------<  155    4.9     |  26.0   |  1.16     Ca    8.8        29 Dec 2023 06:00  Phos  3.1       28 Dec 2023 06:32  Mg     2.4       29 Dec 2023 06:00    TPro  6.9    /  Alb  3.9    /  TBili  0.5    /  DBili  x      /  AST  25     /  ALT  32     /  AlkPhos  96     28 Dec 2023 06:32                          13.5   17.57 )-----------( 236      ( 29 Dec 2023 06:00 )             40.7     PT/INR - ( 27 Dec 2023 14:25 )   PT: 10.9 sec;   INR: 0.98 ratio         PTT - ( 27 Dec 2023 14:25 )  PTT:30.5 sec  Troponin 53, 45    PHARM NUCLEAR STRESS TEST 1/2021  1. Negative for ischemia/infarct, EF 62%  2. BP hypertensive at baseline, normal response  ?    ECHO 9/2022:  1. Mild LVH, EF 55-60%  2. Grade I diastolic dysfunction  3. Normal RV/LA/RA  4. Trivial AI  5. Borderline dilated aortic root/ascending aorta  ?    ECG: SR, no ST-T abnormalities     Assessment:  69 y/o Male with PMHx HTN, HLD, Obesity, COPD. MARY with ROBERTS, cough, Fever, Inc WBC and mildly increased troponin.   -No CP and ECG non ischemic  -Most likely mild demand ischemia in the face of COPD exacerbation, Will consider outpatient ischemic evaluation once recovered     Plan:   1. COPD management per primary team and antibiotics for possible PNA  2. No further inpatient cardiac work up  3. Follow up in office 1-2 weeks post discharge, consider further cardiac work up at that time  4. Will not actively follow, call if questions, thanks!     Scar Diane MD KIMBERLY PALUMBO  [id number]        Chief Complaint: follow up PNA      Subjective: still dyspneic      24 hour Tele: SR        acetaminophen     Tablet .. 650 milliGRAM(s) Oral every 6 hours PRN  albuterol    90 MICROgram(s) HFA Inhaler 2 Puff(s) Inhalation every 6 hours  albuterol/ipratropium for Nebulization 3 milliLiter(s) Nebulizer every 6 hours  aluminum hydroxide/magnesium hydroxide/simethicone Suspension 30 milliLiter(s) Oral every 4 hours PRN  aspirin enteric coated 81 milliGRAM(s) Oral daily  atorvastatin 40 milliGRAM(s) Oral at bedtime  azithromycin  IVPB      azithromycin  IVPB 500 milliGRAM(s) IV Intermittent every 24 hours  benzonatate 100 milliGRAM(s) Oral every 8 hours PRN  cefTRIAXone Injectable. 1000 milliGRAM(s) IV Push every 24 hours  dextrose 5%. 1000 milliLiter(s) IV Continuous <Continuous>  dextrose 5%. 1000 milliLiter(s) IV Continuous <Continuous>  dextrose 50% Injectable 12.5 Gram(s) IV Push once  dextrose 50% Injectable 25 Gram(s) IV Push once  dextrose 50% Injectable 25 Gram(s) IV Push once  dextrose Oral Gel 15 Gram(s) Oral once PRN  enoxaparin Injectable 40 milliGRAM(s) SubCutaneous every 24 hours  glucagon  Injectable 1 milliGRAM(s) IntraMuscular once  guaifenesin/dextromethorphan Oral Liquid 10 milliLiter(s) Oral every 4 hours PRN  insulin lispro (ADMELOG) corrective regimen sliding scale   SubCutaneous at bedtime  insulin lispro (ADMELOG) corrective regimen sliding scale   SubCutaneous three times a day before meals  melatonin 3 milliGRAM(s) Oral at bedtime PRN  methylPREDNISolone sodium succinate Injectable 40 milliGRAM(s) IV Push every 12 hours  metoprolol succinate ER 50 milliGRAM(s) Oral daily  ondansetron Injectable 4 milliGRAM(s) IV Push every 8 hours PRN  sertraline 100 milliGRAM(s) Oral daily  tiotropium 2.5 MICROgram(s) Inhaler 2 Puff(s) Inhalation daily  valsartan 160 milliGRAM(s) Oral daily          Physical Exam:  T(C): 36.4 (12-29-23 @ 04:28), Max: 37 (12-28-23 @ 20:05)  HR: 66 (12-29-23 @ 04:28) (58 - 107)  BP: 136/83 (12-29-23 @ 04:28) (104/69 - 136/83)  RR: 18 (12-29-23 @ 04:28) (18 - 18)  SpO2: 96% (12-29-23 @ 04:28) (92% - 96%)  Wt(kg): --  General: Comfortable in NAD  HEENT: MMM, sclera anicteric  Resp: reduced air movement  CVS: soft s1s2, rrr, no s3/JVD  Abd: soft, NT, ND, obese  Ext: No c/c/e  Neuro A&O x3  Psych: Normal affect    I&O's Summary    28 Dec 2023 07:01  -  29 Dec 2023 07:00  --------------------------------------------------------  IN: 0 mL / OUT: 150 mL / NET: -150 mL          Labs:   29 Dec 2023 06:00    133    |  95     |  34.6   ----------------------------<  155    4.9     |  26.0   |  1.16     Ca    8.8        29 Dec 2023 06:00  Phos  3.1       28 Dec 2023 06:32  Mg     2.4       29 Dec 2023 06:00    TPro  6.9    /  Alb  3.9    /  TBili  0.5    /  DBili  x      /  AST  25     /  ALT  32     /  AlkPhos  96     28 Dec 2023 06:32                          13.5   17.57 )-----------( 236      ( 29 Dec 2023 06:00 )             40.7     PT/INR - ( 27 Dec 2023 14:25 )   PT: 10.9 sec;   INR: 0.98 ratio         PTT - ( 27 Dec 2023 14:25 )  PTT:30.5 sec  Troponin 53, 45    PHARM NUCLEAR STRESS TEST 1/2021  1. Negative for ischemia/infarct, EF 62%  2. BP hypertensive at baseline, normal response  ?    ECHO 9/2022:  1. Mild LVH, EF 55-60%  2. Grade I diastolic dysfunction  3. Normal RV/LA/RA  4. Trivial AI  5. Borderline dilated aortic root/ascending aorta  ?    ECG: SR, no ST-T abnormalities     Assessment:  69 y/o Male with PMHx HTN, HLD, Obesity, COPD. MARY with ROBERTS, cough, Fever, Inc WBC and mildly increased troponin.   -No CP and ECG non ischemic  -Most likely mild demand ischemia in the face of COPD exacerbation, Will consider outpatient ischemic evaluation once recovered     Plan:   1. COPD management per primary team and antibiotics for possible PNA  2. No further inpatient cardiac work up  3. Follow up in office 1-2 weeks post discharge, consider further cardiac work up at that time  4. Will not actively follow, call if questions, thanks!     Scar Diane MD

## 2023-12-29 NOTE — DISCHARGE NOTE NURSING/CASE MANAGEMENT/SOCIAL WORK - PATIENT PORTAL LINK FT
You can access the FollowMyHealth Patient Portal offered by [facility] by registering at the following website: [email or web address]. By joining [employer]’s FollowMyHealth portal, you will also be able to view your health information using other applications (apps) compatible with our system. You can access the FollowMyHealth Patient Portal offered by [facility] by registering at the following website: [email or web address]. By joining [employer]’s FollowMyHealth portal, you will also be able to view your health information using other applications (apps) compatible with our system.

## 2023-12-29 NOTE — PROGRESS NOTE ADULT - SUBJECTIVE AND OBJECTIVE BOX
KIMBERLY PALUMBO    [id number]    68y      Male    CC: sob    INTERVAL HPI/OVERNIGHT EVENTS: pt seen and examined. +multani     REVIEW OF SYSTEMS:    CONSTITUTIONAL: No fever, weight loss  RESPIRATORY: No hemoptysis  CARDIOVASCULAR: No chest pain, palpitations  GASTROINTESTINAL: No abdominal or epigastric pain. No nausea, vomiting    Vital Signs Last 24 Hrs  T(C): 36.7 (29 Dec 2023 15:46), Max: 37 (28 Dec 2023 20:05)  T(F): 98.1 (29 Dec 2023 15:46), Max: 98.6 (28 Dec 2023 20:05)  HR: 77 (29 Dec 2023 16:23) (62 - 107)  BP: 125/76 (29 Dec 2023 15:46) (121/74 - 136/83)  BP(mean): --  RR: 19 (29 Dec 2023 15:46) (18 - 19)  SpO2: 95% (29 Dec 2023 16:23) (90% - 96%)    Parameters below as of 29 Dec 2023 16:23  Patient On (Oxygen Delivery Method): room air        PHYSICAL EXAM:    CONSTITUTIONAL:  NAD  CARDIAC: Regular rate, regular rhythm.  normal +S1, S2.    RESPIRATORY:  course sounds b/l; occasional wheeze; respirations unlabored on RA   GASTROINTESTINAL: Abdomen soft, non-tender, no guarding.  NEUROLOGICAL: Alert and oriented, grossly non-focal   SKIN: warm, dry  PSYCHIATRIC: calm, cooperative    LABS:                        13.5   17.57 )-----------( 236      ( 29 Dec 2023 06:00 )             40.7     12-29    133<L>  |  95<L>  |  34.6<H>  ----------------------------<  155<H>  4.9   |  26.0  |  1.16    Ca    8.8      29 Dec 2023 06:00  Phos  3.1     12-28  Mg     2.4     12-29    TPro  6.9  /  Alb  3.9  /  TBili  0.5  /  DBili  x   /  AST  25  /  ALT  32  /  AlkPhos  96  12-28      Urinalysis Basic - ( 29 Dec 2023 06:00 )    Color: x / Appearance: x / SG: x / pH: x  Gluc: 155 mg/dL / Ketone: x  / Bili: x / Urobili: x   Blood: x / Protein: x / Nitrite: x   Leuk Esterase: x / RBC: x / WBC x   Sq Epi: x / Non Sq Epi: x / Bacteria: x          MEDICATIONS  (STANDING):  albuterol    90 MICROgram(s) HFA Inhaler 2 Puff(s) Inhalation every 6 hours  albuterol/ipratropium for Nebulization 3 milliLiter(s) Nebulizer every 6 hours  aspirin enteric coated 81 milliGRAM(s) Oral daily  atorvastatin 40 milliGRAM(s) Oral at bedtime  azithromycin  IVPB      azithromycin  IVPB 500 milliGRAM(s) IV Intermittent every 24 hours  cefTRIAXone Injectable. 1000 milliGRAM(s) IV Push every 24 hours  dextrose 5%. 1000 milliLiter(s) (50 mL/Hr) IV Continuous <Continuous>  dextrose 5%. 1000 milliLiter(s) (100 mL/Hr) IV Continuous <Continuous>  dextrose 50% Injectable 25 Gram(s) IV Push once  dextrose 50% Injectable 25 Gram(s) IV Push once  dextrose 50% Injectable 12.5 Gram(s) IV Push once  enoxaparin Injectable 40 milliGRAM(s) SubCutaneous every 24 hours  glucagon  Injectable 1 milliGRAM(s) IntraMuscular once  insulin lispro (ADMELOG) corrective regimen sliding scale   SubCutaneous at bedtime  insulin lispro (ADMELOG) corrective regimen sliding scale   SubCutaneous three times a day before meals  metoprolol succinate ER 50 milliGRAM(s) Oral daily  sertraline 100 milliGRAM(s) Oral daily  tiotropium 2.5 MICROgram(s) Inhaler 2 Puff(s) Inhalation daily  valsartan 160 milliGRAM(s) Oral daily    MEDICATIONS  (PRN):  acetaminophen     Tablet .. 650 milliGRAM(s) Oral every 6 hours PRN Temp greater or equal to 38C (100.4F), Mild Pain (1 - 3)  aluminum hydroxide/magnesium hydroxide/simethicone Suspension 30 milliLiter(s) Oral every 4 hours PRN Dyspepsia  benzonatate 100 milliGRAM(s) Oral every 8 hours PRN Cough  dextrose Oral Gel 15 Gram(s) Oral once PRN Blood Glucose LESS THAN 70 milliGRAM(s)/deciliter  guaifenesin/dextromethorphan Oral Liquid 10 milliLiter(s) Oral every 4 hours PRN Cough  melatonin 3 milliGRAM(s) Oral at bedtime PRN Insomnia  ondansetron Injectable 4 milliGRAM(s) IV Push every 8 hours PRN Nausea and/or Vomiting      RADIOLOGY & ADDITIONAL TESTS:   KIMBERLY PALUMBO    [id number]    68y      Male    CC: sob    INTERVAL HPI/OVERNIGHT EVENTS: pt seen and examined. +multani     REVIEW OF SYSTEMS:    CONSTITUTIONAL: No fever, weight loss  RESPIRATORY: No hemoptysis  CARDIOVASCULAR: No chest pain, palpitations  GASTROINTESTINAL: No abdominal or epigastric pain. No nausea, vomiting    Vital Signs Last 24 Hrs  T(C): 36.7 (29 Dec 2023 15:46), Max: 37 (28 Dec 2023 20:05)  T(F): 98.1 (29 Dec 2023 15:46), Max: 98.6 (28 Dec 2023 20:05)  HR: 77 (29 Dec 2023 16:23) (62 - 107)  BP: 125/76 (29 Dec 2023 15:46) (121/74 - 136/83)  BP(mean): --  RR: 19 (29 Dec 2023 15:46) (18 - 19)  SpO2: 95% (29 Dec 2023 16:23) (90% - 96%)    Parameters below as of 29 Dec 2023 16:23  Patient On (Oxygen Delivery Method): room air        PHYSICAL EXAM:    CONSTITUTIONAL:  NAD  CARDIAC: Regular rate, regular rhythm.  normal +S1, S2.    RESPIRATORY:  course sounds b/l; occasional wheeze; respirations unlabored on RA   GASTROINTESTINAL: Abdomen soft, non-tender, no guarding.  NEUROLOGICAL: Alert and oriented, grossly non-focal   SKIN: warm, dry  PSYCHIATRIC: calm, cooperative    LABS:                        13.5   17.57 )-----------( 236      ( 29 Dec 2023 06:00 )             40.7     12-29    133<L>  |  95<L>  |  34.6<H>  ----------------------------<  155<H>  4.9   |  26.0  |  1.16    Ca    8.8      29 Dec 2023 06:00  Phos  3.1     12-28  Mg     2.4     12-29    TPro  6.9  /  Alb  3.9  /  TBili  0.5  /  DBili  x   /  AST  25  /  ALT  32  /  AlkPhos  96  12-28      Urinalysis Basic - ( 29 Dec 2023 06:00 )    Color: x / Appearance: x / SG: x / pH: x  Gluc: 155 mg/dL / Ketone: x  / Bili: x / Urobili: x   Blood: x / Protein: x / Nitrite: x   Leuk Esterase: x / RBC: x / WBC x   Sq Epi: x / Non Sq Epi: x / Bacteria: x          MEDICATIONS  (STANDING):  albuterol    90 MICROgram(s) HFA Inhaler 2 Puff(s) Inhalation every 6 hours  albuterol/ipratropium for Nebulization 3 milliLiter(s) Nebulizer every 6 hours  aspirin enteric coated 81 milliGRAM(s) Oral daily  atorvastatin 40 milliGRAM(s) Oral at bedtime  azithromycin  IVPB      azithromycin  IVPB 500 milliGRAM(s) IV Intermittent every 24 hours  cefTRIAXone Injectable. 1000 milliGRAM(s) IV Push every 24 hours  dextrose 5%. 1000 milliLiter(s) (50 mL/Hr) IV Continuous <Continuous>  dextrose 5%. 1000 milliLiter(s) (100 mL/Hr) IV Continuous <Continuous>  dextrose 50% Injectable 25 Gram(s) IV Push once  dextrose 50% Injectable 25 Gram(s) IV Push once  dextrose 50% Injectable 12.5 Gram(s) IV Push once  enoxaparin Injectable 40 milliGRAM(s) SubCutaneous every 24 hours  glucagon  Injectable 1 milliGRAM(s) IntraMuscular once  insulin lispro (ADMELOG) corrective regimen sliding scale   SubCutaneous at bedtime  insulin lispro (ADMELOG) corrective regimen sliding scale   SubCutaneous three times a day before meals  metoprolol succinate ER 50 milliGRAM(s) Oral daily  sertraline 100 milliGRAM(s) Oral daily  tiotropium 2.5 MICROgram(s) Inhaler 2 Puff(s) Inhalation daily  valsartan 160 milliGRAM(s) Oral daily    MEDICATIONS  (PRN):  acetaminophen     Tablet .. 650 milliGRAM(s) Oral every 6 hours PRN Temp greater or equal to 38C (100.4F), Mild Pain (1 - 3)  aluminum hydroxide/magnesium hydroxide/simethicone Suspension 30 milliLiter(s) Oral every 4 hours PRN Dyspepsia  benzonatate 100 milliGRAM(s) Oral every 8 hours PRN Cough  dextrose Oral Gel 15 Gram(s) Oral once PRN Blood Glucose LESS THAN 70 milliGRAM(s)/deciliter  guaifenesin/dextromethorphan Oral Liquid 10 milliLiter(s) Oral every 4 hours PRN Cough  melatonin 3 milliGRAM(s) Oral at bedtime PRN Insomnia  ondansetron Injectable 4 milliGRAM(s) IV Push every 8 hours PRN Nausea and/or Vomiting      RADIOLOGY & ADDITIONAL TESTS:

## 2023-12-29 NOTE — DISCHARGE NOTE NURSING/CASE MANAGEMENT/SOCIAL WORK - NSCORESITESY/N_GEN_A_CORE_RD
Nursing discharge note  Pt discharge home, IV removed, Tele DC and returned to monitor tech. F/U instructions provided and discussed. Pt verbalized understanding. Prescriptions given, discussed adverse reactions and side effects of all new medications, and provided appropriate handouts. Pt  verbalized understanding. Pt walked out with the writer and all belongings. Pt denies C/O pain, malaise , or cardiac S/S. All needs met by staff.
Received patient from ED, patient is alert and oriented x4. Patient on room air, no shortness of breath. Afib on tele, no complains of chest pain. Abdomen is soft, non-tendered, bowel sounds are active in all four quadrants. Plan of care updated, questions are answered. Bed in lower position, call light within reach. Patient's heart rhythm covert to sinus rhythm, vital signs are stable, will discharge later today.
No

## 2023-12-29 NOTE — PROGRESS NOTE ADULT - ASSESSMENT
68y/oM PMH MARY, COPD (not on home O2), HTN, HLD sent to ER from pulmonologists office (Dr. Mas) with cough x1 week and worsening dyspnea x2 days. In ER, was reportedly briefly hypoxic on RA, labs significant for wbc 19. Pt admitted with COPD exacerbation    COPD exacerbation   suspected bacterial pneumonia, likely gram positive   -admit to tele/  -RVP neg   -f/u bcx, sputum cx   -f/u legionella, strep pneumo   -s/p azithromycin, ceftriaxone, IV solumedrol in ER   -cont azithromycin, ceftriaxone   -CXR reviewed, grossly clear   -supplemental O2 as needed   -IS   -cont IV solumedrol , likely decrease tomorrow 12/29  -nebs  -ISS while on steroids     HTN, HLD   CAD  -cont home valsartan 160mg qd, metoprolol ER 50mg qd, atorvastatin 40mg qd, asa 81mg qd     Depression/anxiety  -cont sertraline 100mg qd     MARY   -nocturnal cpap     vte ppx: lovenox sq     
68y/oM PMH MARY, COPD (not on home O2), HTN, HLD sent to ER from pulmonologists office (Dr. Mas) with cough x1 week and worsening dyspnea x2 days. In ER, was reportedly briefly hypoxic on RA, labs significant for wbc 19. Pt admitted with COPD exacerbation    COPD exacerbation   suspected bacterial pneumonia, likely gram positive   -admit to tele/  -RVP neg   -bcx ngtd   -f/u sputum cx   -strep pneumo neg  -legionella neg   -s/p azithromycin, ceftriaxone, IV solumedrol in ER   -cont azithromycin, ceftriaxone   -CXR reviewed, grossly clear   -supplemental O2 as needed   -IS   -cont IV solumedrol   -nebs  -ISS while on steroids     HTN, HLD   CAD  -cont home valsartan 160mg qd, metoprolol ER 50mg qd, atorvastatin 40mg qd, asa 81mg qd     Depression/anxiety  -cont sertraline 100mg qd     MARY   -nocturnal cpap     vte ppx: lovenox sq       dispo: home when medically stable, poss next 1-2 days

## 2023-12-29 NOTE — DISCHARGE NOTE NURSING/CASE MANAGEMENT/SOCIAL WORK - NSDCPEFALRISK_GEN_ALL_CORE
For information on Fall & Injury Prevention, visit: [email or web address] OR  [email or web address] OR  https://www.cdc.gov/steadi/patient.html For information on Fall & Injury Prevention, visit: [email or web address] OR  [email or web address] OR  https://www.cdc.gov/steadi/patient.html

## 2023-12-30 ENCOUNTER — TRANSCRIPTION ENCOUNTER (OUTPATIENT)
Age: 68
End: 2023-12-30

## 2023-12-30 VITALS
HEART RATE: 77 BPM | SYSTOLIC BLOOD PRESSURE: 143 MMHG | RESPIRATION RATE: 18 BRPM | OXYGEN SATURATION: 92 % | TEMPERATURE: 98 F | DIASTOLIC BLOOD PRESSURE: 93 MMHG

## 2023-12-30 LAB
GLUCOSE BLDC GLUCOMTR-MCNC: 122 MG/DL — HIGH (ref 70–99)
GLUCOSE BLDC GLUCOMTR-MCNC: 122 MG/DL — HIGH (ref 70–99)

## 2023-12-30 PROCEDURE — 0225U NFCT DS DNA&RNA 21 SARSCOV2: CPT

## 2023-12-30 PROCEDURE — 83735 ASSAY OF MAGNESIUM: CPT

## 2023-12-30 PROCEDURE — 87449 NOS EACH ORGANISM AG IA: CPT

## 2023-12-30 PROCEDURE — 82803 BLOOD GASES ANY COMBINATION: CPT

## 2023-12-30 PROCEDURE — 86803 HEPATITIS C AB TEST: CPT

## 2023-12-30 PROCEDURE — 87040 BLOOD CULTURE FOR BACTERIA: CPT

## 2023-12-30 PROCEDURE — 99497 ADVNCD CARE PLAN 30 MIN: CPT

## 2023-12-30 PROCEDURE — 84132 ASSAY OF SERUM POTASSIUM: CPT

## 2023-12-30 PROCEDURE — 80048 BASIC METABOLIC PNL TOTAL CA: CPT

## 2023-12-30 PROCEDURE — 82435 ASSAY OF BLOOD CHLORIDE: CPT

## 2023-12-30 PROCEDURE — 94660 CPAP INITIATION&MGMT: CPT

## 2023-12-30 PROCEDURE — 85014 HEMATOCRIT: CPT

## 2023-12-30 PROCEDURE — 96374 THER/PROPH/DIAG INJ IV PUSH: CPT

## 2023-12-30 PROCEDURE — 99239 HOSP IP/OBS DSCHRG MGMT >30: CPT

## 2023-12-30 PROCEDURE — 81001 URINALYSIS AUTO W/SCOPE: CPT

## 2023-12-30 PROCEDURE — 85025 COMPLETE CBC W/AUTO DIFF WBC: CPT

## 2023-12-30 PROCEDURE — 87640 STAPH A DNA AMP PROBE: CPT

## 2023-12-30 PROCEDURE — 71045 X-RAY EXAM CHEST 1 VIEW: CPT

## 2023-12-30 PROCEDURE — 85018 HEMOGLOBIN: CPT

## 2023-12-30 PROCEDURE — 83935 ASSAY OF URINE OSMOLALITY: CPT

## 2023-12-30 PROCEDURE — 85610 PROTHROMBIN TIME: CPT

## 2023-12-30 PROCEDURE — 36415 COLL VENOUS BLD VENIPUNCTURE: CPT

## 2023-12-30 PROCEDURE — 83930 ASSAY OF BLOOD OSMOLALITY: CPT

## 2023-12-30 PROCEDURE — 83605 ASSAY OF LACTIC ACID: CPT

## 2023-12-30 PROCEDURE — 94640 AIRWAY INHALATION TREATMENT: CPT

## 2023-12-30 PROCEDURE — 80053 COMPREHEN METABOLIC PANEL: CPT

## 2023-12-30 PROCEDURE — 85730 THROMBOPLASTIN TIME PARTIAL: CPT

## 2023-12-30 PROCEDURE — 84100 ASSAY OF PHOSPHORUS: CPT

## 2023-12-30 PROCEDURE — 82947 ASSAY GLUCOSE BLOOD QUANT: CPT

## 2023-12-30 PROCEDURE — 82962 GLUCOSE BLOOD TEST: CPT

## 2023-12-30 PROCEDURE — 83036 HEMOGLOBIN GLYCOSYLATED A1C: CPT

## 2023-12-30 PROCEDURE — 96375 TX/PRO/DX INJ NEW DRUG ADDON: CPT

## 2023-12-30 PROCEDURE — 84300 ASSAY OF URINE SODIUM: CPT

## 2023-12-30 PROCEDURE — 82330 ASSAY OF CALCIUM: CPT

## 2023-12-30 PROCEDURE — 99285 EMERGENCY DEPT VISIT HI MDM: CPT

## 2023-12-30 PROCEDURE — 84484 ASSAY OF TROPONIN QUANT: CPT

## 2023-12-30 PROCEDURE — 84295 ASSAY OF SERUM SODIUM: CPT

## 2023-12-30 PROCEDURE — 93005 ELECTROCARDIOGRAM TRACING: CPT

## 2023-12-30 PROCEDURE — 87899 AGENT NOS ASSAY W/OPTIC: CPT

## 2023-12-30 PROCEDURE — 87641 MR-STAPH DNA AMP PROBE: CPT

## 2023-12-30 PROCEDURE — 94760 N-INVAS EAR/PLS OXIMETRY 1: CPT

## 2023-12-30 RX ORDER — AZITHROMYCIN 500 MG/1
1 TABLET, FILM COATED ORAL
Qty: 2 | Refills: 0
Start: 2023-12-30 | End: 2023-12-31

## 2023-12-30 RX ORDER — FLURBIPROFEN 100 MG
1 TABLET ORAL
Refills: 0 | DISCHARGE

## 2023-12-30 RX ORDER — ALBUTEROL 90 UG/1
3 AEROSOL, METERED ORAL
Qty: 36 | Refills: 0
Start: 2023-12-30 | End: 2024-01-28

## 2023-12-30 RX ORDER — IPRATROPIUM/ALBUTEROL SULFATE 18-103MCG
3 AEROSOL WITH ADAPTER (GRAM) INHALATION
Qty: 360 | Refills: 0
Start: 2023-12-30 | End: 2024-01-28

## 2023-12-30 RX ORDER — IPRATROPIUM BROMIDE 0.2 MG/ML
2.5 SOLUTION, NON-ORAL INHALATION
Qty: 300 | Refills: 0
Start: 2023-12-30 | End: 2024-01-28

## 2023-12-30 RX ORDER — IPRATROPIUM BROMIDE 0.2 MG/ML
2.5 SOLUTION, NON-ORAL INHALATION
Qty: 1 | Refills: 0
Start: 2023-12-30 | End: 2024-01-28

## 2023-12-30 RX ORDER — ALBUTEROL 90 UG/1
2 AEROSOL, METERED ORAL
Qty: 1 | Refills: 0
Start: 2023-12-30 | End: 2024-01-28

## 2023-12-30 RX ADMIN — Medication 40 MILLIGRAM(S): at 05:25

## 2023-12-30 RX ADMIN — VALSARTAN 160 MILLIGRAM(S): 80 TABLET ORAL at 05:24

## 2023-12-30 RX ADMIN — Medication 50 MILLIGRAM(S): at 05:24

## 2023-12-30 RX ADMIN — Medication 3 MILLILITER(S): at 04:05

## 2023-12-30 NOTE — DISCHARGE NOTE PROVIDER - NSDCCPCAREPLAN_GEN_ALL_CORE_FT
PRINCIPAL DISCHARGE DIAGNOSIS  Diagnosis: Acute hypoxic respiratory failure  Assessment and Plan of Treatment:       SECONDARY DISCHARGE DIAGNOSES  Diagnosis: COPD exacerbation  Assessment and Plan of Treatment: continue steroid taper. follow up with pulmonology    Diagnosis: Pneumonia  Assessment and Plan of Treatment: complete antibiotic course

## 2023-12-30 NOTE — DISCHARGE NOTE PROVIDER - CARE PROVIDER_API CALL
Celestino Mas  Critical Care Medicine  [address], [address]  [place] [address]  Phone: [phone]  Fax: [phone]  Follow Up Time:     Scar Diane  Cardiovascular Disease  [address] [place] [address]  Phone: [phone]  Fax: [phone]  Follow Up Time:     PMD,   Phone: (   )    -  Fax: (   )    -  Follow Up Time:    Celestino Mas  Critical Care Medicine  [address], [address]  [place] [address]  Phone: [phone]  Fax: [phone]  Follow Up Time:     Scar Diane  Cardiovascular Disease  [address] [place] [address]  Phone: [phone]  Fax: [phone]  Follow Up Time:     PMD,   Phone: (   )    -  Fax: (   )    -  Follow Up Time:

## 2023-12-30 NOTE — DISCHARGE NOTE PROVIDER - NSDCHHNEEDSERVICE_GEN_ALL_CORE
Observation and assessment

## 2023-12-30 NOTE — DISCHARGE NOTE PROVIDER - PROVIDER TOKENS
PROVIDER:[TOKEN:[[id number]]],PROVIDER:[TOKEN:[[id number]]],FREE:[LAST:[PMD],PHONE:[(   )    -],FAX:[(   )    -]] PROVIDER:[TOKEN:[[id number]]],PROVIDER:[TOKEN:[[id number]]],FREE:[LAST:[PMD],PHONE:[(   )    -],FAX:[(   )    -]]

## 2023-12-30 NOTE — DISCHARGE NOTE PROVIDER - HOSPITAL COURSE
68y/oM PMH MARY, COPD (not on home O2), HTN, HLD sent to ER from pulmonologists office (Dr. Mas) with cough x1 week and worsening dyspnea x2 days. In ER, was reportedly briefly hypoxic on RA, labs significant for wbc 19. Pt admitted with COPD exacerbation, started on IV steroids, nebs, gram  positive pna antibiotic coverage. 68y/oM PMH MARY, COPD (not on home O2), HTN, HLD sent to ER from pulmonologists office (Dr. Mas) with cough x1 week and worsening dyspnea x2 days. In ER, was reportedly briefly hypoxic on RA, labs significant for wbc 19. Pt admitted with COPD exacerbation, started on IV steroids, nebs, gram  positive pna antibiotic coverage. additionally seen by cardiology, to f/u outpt. ROBERTS improving, mild desats, though spO2 remaining in 90s on ambulation. pt to f/u pulm outpt. to dc home

## 2023-12-30 NOTE — DISCHARGE NOTE PROVIDER - ATTENDING DISCHARGE PHYSICAL EXAMINATION:
Vital Signs Last 24 Hrs  T(C): 36.6 (29 Dec 2023 23:35), Max: 36.7 (29 Dec 2023 11:00)  T(F): 97.9 (29 Dec 2023 23:35), Max: 98.1 (29 Dec 2023 15:46)  HR: 76 (30 Dec 2023 04:13) (72 - 89)  BP: 134/79 (30 Dec 2023 04:13) (123/78 - 134/79)  BP(mean): --  RR: 18 (30 Dec 2023 04:13) (18 - 19)  SpO2: 94% (30 Dec 2023 04:13) (90% - 97%)    Parameters below as of 30 Dec 2023 04:13  Patient On (Oxygen Delivery Method): room air     Vital Signs Last 24 Hrs  T(C): 36.6 (29 Dec 2023 23:35), Max: 36.7 (29 Dec 2023 11:00)  T(F): 97.9 (29 Dec 2023 23:35), Max: 98.1 (29 Dec 2023 15:46)  HR: 76 (30 Dec 2023 04:13) (72 - 89)  BP: 134/79 (30 Dec 2023 04:13) (123/78 - 134/79)  BP(mean): --  RR: 18 (30 Dec 2023 04:13) (18 - 19)  SpO2: 94% (30 Dec 2023 04:13) (90% - 97%)    Parameters below as of 30 Dec 2023 04:13  Patient On (Oxygen Delivery Method): room air    CONSTITUTIONAL:  NAD  CARDIAC: Regular rate, regular rhythm.  normal +S1, S2.    RESPIRATORY:  course sounds b/l; respirations unlabored on RA   GASTROINTESTINAL: Abdomen soft, non-tender, no guarding.  NEUROLOGICAL: Alert and oriented, grossly non-focal   SKIN: warm, dry  PSYCHIATRIC: calm, cooperative

## 2023-12-30 NOTE — DISCHARGE NOTE PROVIDER - NSDCMRMEDTOKEN_GEN_ALL_CORE_FT
atorvastatin 40 mg oral tablet: 1 tab(s) orally once a day  Hank Low Dose 81 mg oral delayed release tablet: 1 tab(s) orally once a day- resume after completion of full dose asa  centrum senior: 1 tab(s) orally once a day  flurbiprofen 100 mg oral tablet: 1 tab(s) orally once a day as needed for knee pain  metoprolol succinate 50 mg oral tablet, extended release: 1 tab(s) orally once a day  sertraline 100 mg oral tablet: 1 tab(s) orally once a day  valsartan 160 mg oral tablet: 1 tab(s) orally once a day   albuterol 90 mcg/inh inhalation aerosol: 2 puff(s) inhaled every 6 hours  atorvastatin 40 mg oral tablet: 1 tab(s) orally once a day  Hank Low Dose 81 mg oral delayed release tablet: 1 tab(s) orally once a day- resume after completion of full dose asa  centrum senior: 1 tab(s) orally once a day  ipratropium-albuterol 0.5 mg-2.5 mg/3 mL inhalation solution: 3 milliliter(s) inhaled every 6 hours as needed for  shortness of breath and/or wheezing  metoprolol succinate 50 mg oral tablet, extended release: 1 tab(s) orally once a day  sertraline 100 mg oral tablet: 1 tab(s) orally once a day  valsartan 160 mg oral tablet: 1 tab(s) orally once a day   albuterol 90 mcg/inh inhalation aerosol: 2 puff(s) inhaled every 6 hours  atorvastatin 40 mg oral tablet: 1 tab(s) orally once a day  Hank Low Dose 81 mg oral delayed release tablet: 1 tab(s) orally once a day- resume after completion of full dose asa  centrum senior: 1 tab(s) orally once a day  ipratropium-albuterol 0.5 mg-2.5 mg/3 mL inhalation solution: 3 milliliter(s) inhaled every 6 hours as needed for  shortness of breath and/or wheezing  metoprolol succinate 50 mg oral tablet, extended release: 1 tab(s) orally once a day  Nebulizer machine: use prn sob/wheezing  sertraline 100 mg oral tablet: 1 tab(s) orally once a day  valsartan 160 mg oral tablet: 1 tab(s) orally once a day   albuterol 2.5 mg/3 mL (0.083%) inhalation solution: 3 milliliter(s) by nebulizer every 6 hours as needed for  shortness of breath and/or wheezing  albuterol 90 mcg/inh inhalation aerosol: 2 puff(s) inhaled every 6 hours  atorvastatin 40 mg oral tablet: 1 tab(s) orally once a day  azithromycin 250 mg oral tablet: 1 tab(s) orally once a day  Hank Low Dose 81 mg oral delayed release tablet: 1 tab(s) orally once a day- resume after completion of full dose asa  centrum senior: 1 tab(s) orally once a day  ipratropium 500 mcg/2.5 mL inhalation solution: 2.5 milliliter(s) by nebulizer every 6 hours as needed for  shortness of breath and/or wheezing  metoprolol succinate 50 mg oral tablet, extended release: 1 tab(s) orally once a day  Nebulizer machine: use prn sob/wheezing  predniSONE 10 mg oral tablet: 1 tab(s) orally once a day 4 tabs once a day x2 days then  3 tabs once a day x2 days then   2 tabs once a day x2 days then   1 tab once a day x2 days  sertraline 100 mg oral tablet: 1 tab(s) orally once a day  valsartan 160 mg oral tablet: 1 tab(s) orally once a day

## 2023-12-30 NOTE — DISCHARGE NOTE PROVIDER - NSDCFUSCHEDAPPT_GEN_ALL_CORE_FT
Celestino Mas  [place] [facility] [place] [facility]  Scheduled Appointment: 01/11/2024    Celestino Mas  [place] [facility] [place] [facility]  Scheduled Appointment: 01/24/2024     Celestino Mas  [place] [facility] [place] [facility]  Scheduled Appointment: 01/11/2024    Celestino Mas  [place] [facility] [place] [facility]  Scheduled Appointment: 01/24/2024

## 2023-12-30 NOTE — DISCHARGE NOTE PROVIDER - NSDCFUADDAPPT_GEN_ALL_CORE_FT
STAR INFO:  -PULM – Appt w/ Dr. Mas on 01/24 at 3:00.  [address] [facility]. If you are unable to attend your pre-scheduled appointment,   please contact the office directly at [phone] to reschedule.  -AGREEABLE TO HOME CARE, DECLINES MEDS TO BED     STAR INFO:  -PULM – Appt w/ Dr. Mas on 01/24 at 3:00.  [address] [facility]. If you are unable to attend your pre-scheduled appointment,   please contact the office directly at [phone] to reschedule.  -AGREEABLE TO HOME CARE, DECLINES MEDS TO BED

## 2023-12-30 NOTE — GOALS OF CARE CONVERSATION - ADVANCED CARE PLANNING - CONVERSATION DETAILS
pt reports recently signing power of  and discussing advanced directives with wife and family. pt currently full code

## 2023-12-31 ENCOUNTER — TRANSCRIPTION ENCOUNTER (OUTPATIENT)
Age: 68
End: 2023-12-31

## 2024-01-02 ENCOUNTER — TRANSCRIPTION ENCOUNTER (OUTPATIENT)
Age: 69
End: 2024-01-02

## 2024-01-02 LAB
CULTURE RESULTS: SIGNIFICANT CHANGE UP
SPECIMEN SOURCE: SIGNIFICANT CHANGE UP

## 2024-01-03 ENCOUNTER — TRANSCRIPTION ENCOUNTER (OUTPATIENT)
Age: 69
End: 2024-01-03

## 2024-01-11 ENCOUNTER — TRANSCRIPTION ENCOUNTER (OUTPATIENT)
Age: 69
End: 2024-01-11

## 2024-01-11 ENCOUNTER — APPOINTMENT (OUTPATIENT)
Dept: PULMONOLOGY | Facility: CLINIC | Age: 69
End: 2024-01-11
Payer: MEDICARE

## 2024-01-11 VITALS
HEART RATE: 65 BPM | RESPIRATION RATE: 16 BRPM | HEIGHT: 66 IN | DIASTOLIC BLOOD PRESSURE: 62 MMHG | SYSTOLIC BLOOD PRESSURE: 126 MMHG | BODY MASS INDEX: 39.37 KG/M2 | WEIGHT: 245 LBS | OXYGEN SATURATION: 94 %

## 2024-01-11 PROCEDURE — 99214 OFFICE O/P EST MOD 30 MIN: CPT

## 2024-01-11 NOTE — HISTORY OF PRESENT ILLNESS
[Former] : former [TextBox_4] : 68M PMH mild COPD, MARY on APAP, CAD, HTN, HLD who presents for f/u visit. Was seen 12/27/23 for sick visit, exacerbation of COPD, sent to [facility] and admitted for exacerbation of COPD. Cultures were negative, and was discharged on 12/30/23. CXR done at [facility] was grossly clear. He is feeling much better. Only on albuterol neb + HFA. No worsening cough or SOB.   [TextBox_11] : 1 [TextBox_13] : 25 [YearQuit] : 1990s

## 2024-01-11 NOTE — PHYSICAL EXAM
[TextEntry] : Gen - In NAD, communicating easily and in full sentences HEENT - NC/AT CV - +S1, S2  Resp - CTA B/L, good inspiratory effort, non-labored breathing Ext - No pedal edema Skin - No exposed rashes or lesions Neuro - Moves all four extremities Psych - Normal affect

## 2024-01-11 NOTE — ASSESSMENT
[FreeTextEntry1] : 68M PMH mild COPD, MARY on APAP, CAD, HTN, HLD who presents for f/u visit  - Recently hospitalized for AECOPD - discharged 12/30/23 - records reviewed, CXR clear, cultures negative - Starting Trelegy 100 - Inhaler technique demonstrated - Advised to rinse mouth and gargle with mouthwash after each use - May c/w albuterol PRN, he has nebulizer machine as well - Will have him return for spirometry with next visit - F/u with Dr. De La Paz re: MARY (previously Dr. Daugherty) - F/u with me in 3 mo time  The patient expressed understanding and agreement with the plan as outlined above, and accepts that it is their responsibility to be compliant with any recommended testing, treatment, and follow-up visits. All relevant questions and concerns were addressed.  30 minutes of time were spent on the encounter. Medical records were reviewed, including but not limited to hospital records, outpatient records, laboratory data, and diagnostic imaging studies. Greater than 50% of the face-to-face encounter time was spent on counseling and/or coordination of care.  Celestino Mas M.D. Pulmonary & Critical Care Medicine [facility] at [place] [address] [place], [place] [address] T: [phone] F: [phone]

## 2024-01-23 ENCOUNTER — TRANSCRIPTION ENCOUNTER (OUTPATIENT)
Age: 69
End: 2024-01-23

## 2024-01-24 ENCOUNTER — APPOINTMENT (OUTPATIENT)
Dept: PULMONOLOGY | Facility: CLINIC | Age: 69
End: 2024-01-24

## 2024-01-29 RX ORDER — ATORVASTATIN CALCIUM 40 MG/1
40 TABLET, FILM COATED ORAL
Qty: 90 | Refills: 1 | Status: ACTIVE | COMMUNITY
Start: 2019-05-23 | End: 1900-01-01

## 2024-01-30 ENCOUNTER — TRANSCRIPTION ENCOUNTER (OUTPATIENT)
Age: 69
End: 2024-01-30

## 2024-01-30 ENCOUNTER — APPOINTMENT (OUTPATIENT)
Dept: CARDIOLOGY | Facility: CLINIC | Age: 69
End: 2024-01-30
Payer: MEDICARE

## 2024-01-30 PROCEDURE — 93306 TTE W/DOPPLER COMPLETE: CPT

## 2024-01-30 PROCEDURE — 93880 EXTRACRANIAL BILAT STUDY: CPT

## 2024-02-14 ENCOUNTER — APPOINTMENT (OUTPATIENT)
Dept: CARDIOLOGY | Facility: CLINIC | Age: 69
End: 2024-02-14
Payer: MEDICARE

## 2024-02-14 VITALS
DIASTOLIC BLOOD PRESSURE: 80 MMHG | RESPIRATION RATE: 16 BRPM | WEIGHT: 250 LBS | SYSTOLIC BLOOD PRESSURE: 150 MMHG | HEART RATE: 54 BPM | HEIGHT: 66 IN | BODY MASS INDEX: 40.18 KG/M2

## 2024-02-14 DIAGNOSIS — I25.84 ATHEROSCLEROTIC HEART DISEASE OF NATIVE CORONARY ARTERY W/OUT ANGINA PECTORIS: ICD-10-CM

## 2024-02-14 DIAGNOSIS — I25.10 ATHEROSCLEROTIC HEART DISEASE OF NATIVE CORONARY ARTERY W/OUT ANGINA PECTORIS: ICD-10-CM

## 2024-02-14 DIAGNOSIS — Z87.891 PERSONAL HISTORY OF NICOTINE DEPENDENCE: ICD-10-CM

## 2024-02-14 PROCEDURE — G2211 COMPLEX E/M VISIT ADD ON: CPT

## 2024-02-14 PROCEDURE — 99214 OFFICE O/P EST MOD 30 MIN: CPT

## 2024-02-14 RX ORDER — IPRATROPIUM BROMIDE AND ALBUTEROL SULFATE 2.5; .5 MG/3ML; MG/3ML
0.5-2.5 (3) SOLUTION RESPIRATORY (INHALATION)
Refills: 0 | Status: ACTIVE | COMMUNITY

## 2024-02-14 RX ORDER — ALBUTEROL SULFATE 2.5 MG/3ML
(2.5 MG/3ML) SOLUTION RESPIRATORY (INHALATION)
Refills: 0 | Status: ACTIVE | COMMUNITY

## 2024-02-14 NOTE — ASSESSMENT
[FreeTextEntry1] : ECG: SB, no ST-T abnormalities     HDL 45 LDL 49  (5/2023)  HDL 59 LDL 61 TG 79 (9/2021)  HDL 60 LDL 56 TG 63 (11/2020)  HDL 64 LDL 59 TG 67 (8/2019)  HDL 69 LDL 46 TG 61 (6/2018)  CAROTID 1/2024: 1. 1-19% ICA stenosis bilaterally 2. Antegrade flow in the vertebral arteries bilaterally 3. No change from prior  CAROTID 9/2022: 1. 1-49% ICA stenosis bilaterally 2. Antegrade flow in the vertebral arteries bilaterally 3. No change from prior  CAROTID 1/2021: 1. 1-49% ICA stenosis bilaterally 2. Antegrade flow in the vertebral arteries bilaterally  CAROTID DUPLEX 11/2019: 1. There is 1-49% stenosis of the left proximal ICA. 2. There is 1-49% stenosis of the right proximal ICA. 3. There is antegrade flow in the right and left vertebral arteries. 4. No change compared to 2018 study  ECHO 1/2024: 1. Mild LVH, EF 55-60% 2. Grade I diastolic dysfunction  3. Mild LAE and KATLYN 4. Ascending aorta 1.8cm/m2, 4cm   ECHO 9/2022: 1. Mild LVH, EF 55-60% 2. Grade I diastolic dysfunction 3. Normal RV/LA/RA 4. Trivial AI 5. Borderline dilated aortic root/ascending aorta  ECHO 1/2021: 1. Normal LV size, systolic and diastolic function. EF 60-65% 2. Borderline LAE 3. Normal RV/RA 4. Mild AI 5. Borderline dilated aorta at 3.9cm   ECHO 11/2019: 1. Technically difficult study due to body habitus. 2. Normal LV size, systolic and diastolic function. EF 60-65% 3. Normal RV/LA/RA 4. There is borderline dilatation of the ascending aorta. Asc Ao 3.9cm  PHARM NUCLEAR STRESS TEST 1/2021 1. Negative for ischemia/infarct, EF 62% 2. BP hypertensive at baseline, normal response   PHARM NUCLEAR STRESS TSET 5/2018: 1. Normal Sestamibi myocardial perfusion SPECT imaging with artifact as noted above. 2. Left ventricular function was normal with an EF of 67%. 3. The EKG was negative for ischemia. 4. The blood pressure response was hypertensive baseline with normal response to Regadenason. 5. The patient developed occasional PVCs 6. When compared to prior study dated 2017, there is no longer evidence of ischemia.

## 2024-02-14 NOTE — DISCUSSION/SUMMARY
[FreeTextEntry1] : Patient is a 67yo M with mild non-obstructive CAD (coronary calcifications seen on CT), HTN,HLD, obesity MARY on CPAP, PVCs, right knee replacement. mild carotid stenosis here for cardiac follow up. -Carotid with stable mild disease 1/2024, med management -Echo 9/2022 with preserved BiV function, aorta only borderline dilated. Trivial AI, mild LVH -Echo 1/2024 with LVH, normal function, diastolic dysfunction with normal LAP and no PHTN -Nuclear stress testing 1/2021 without ischemia -Holter 8/2022 with SR and PVCs with low burden, no events -Breathing issues appear mostly related to his pulmonary disease and weight, not HFpEF  -BP higher today, at home running 120s/80s at home -Limited in activity now due to left knee, may need replaced as had right done but he is anxious about it and holding off   1.  CV stable, continue aggressive risk factor modification  2. Monitor BP at home , if high will increase ARB.  3. Recommend aggressive diet and lifestyle modifications, counselled on weight loss. Not amenable to GLP1 agonist 4. Recommend 30 minutes moderate intensity aerobic activity 5 days per week as tolerated by knees 5. CPAP for MARY, pulm follow up for this and COPD  6. Continue medical management of CAD/carotid stenosis. No new symptoms at this time  7. Regular urology follow up with Dr Funk 8.Follow up 3-4 months

## 2024-02-14 NOTE — HISTORY OF PRESENT ILLNESS
[FreeTextEntry1] : Patient is a 67yo M with h/o mild CAD, HTN, COPD, MARY on CPAP, knee replacement,  PVCs, here for cardiac follow up. Recently at [facility] for COPD exacerbation. feeling better now. Put on Trelegy by Pulmonary Dr Mas. Feeling about back to baseline he thinks. NO chest pain. Patient denies PND/orthopnea/edema/palpitations/syncope/claudication.    Using CPAP at night without O2, autoregulated. No CP, still with chronic dyspnea and fatigue. More limited by knees lately. Patient denies PND/orthopnea/edema/palpitations/syncope/claudication. Still hesitant about left knee surgery. Had cataract surgery which has helped vision.   Works at [employer] nights part time. Daytime occasionally works as .  Helps with 5 grandchildren as well.   ROS: GI and  negative

## 2024-02-29 ENCOUNTER — OFFICE (OUTPATIENT)
Dept: URBAN - METROPOLITAN AREA CLINIC 104 | Facility: CLINIC | Age: 69
Setting detail: OPHTHALMOLOGY
End: 2024-02-29
Payer: MEDICARE

## 2024-02-29 DIAGNOSIS — Z96.1: ICD-10-CM

## 2024-02-29 DIAGNOSIS — H26.493: ICD-10-CM

## 2024-02-29 DIAGNOSIS — H18.513: ICD-10-CM

## 2024-02-29 DIAGNOSIS — H01.002: ICD-10-CM

## 2024-02-29 DIAGNOSIS — H18.523: ICD-10-CM

## 2024-02-29 DIAGNOSIS — H02.831: ICD-10-CM

## 2024-02-29 DIAGNOSIS — H02.834: ICD-10-CM

## 2024-02-29 DIAGNOSIS — H01.005: ICD-10-CM

## 2024-02-29 DIAGNOSIS — H43.391: ICD-10-CM

## 2024-02-29 DIAGNOSIS — H01.004: ICD-10-CM

## 2024-02-29 DIAGNOSIS — H01.001: ICD-10-CM

## 2024-02-29 PROCEDURE — 92014 COMPRE OPH EXAM EST PT 1/>: CPT | Performed by: OPHTHALMOLOGY

## 2024-02-29 ASSESSMENT — REFRACTION_MANIFEST
OS_VA1: 20/25
OS_SPHERE: PLANO
OD_VA2: 20/20(J1+)
OD_SPHERE: +0.75
OS_CYLINDER: -0.75
OD_AXIS: 060
OD_ADD: +2.50
OD_VA1: 20/25
OD_CYLINDER: -1.50
OS_VA2: 20/20(J1+)
OS_ADD: +2.50
OS_AXIS: 075

## 2024-02-29 ASSESSMENT — CONFRONTATIONAL VISUAL FIELD TEST (CVF)
OS_FINDINGS: FULL
OD_FINDINGS: FULL

## 2024-02-29 ASSESSMENT — LID EXAM ASSESSMENTS
OD_BLEPHARITIS: RLL RUL 2+
OS_BLEPHARITIS: LLL LUL 2+

## 2024-02-29 ASSESSMENT — LID POSITION - DERMATOCHALASIS
OS_DERMATOCHALASIS: LUL 1+
OD_DERMATOCHALASIS: RUL 1+

## 2024-02-29 ASSESSMENT — SPHEQUIV_DERIVED: OD_SPHEQUIV: 0

## 2024-03-08 NOTE — H&P PST ADULT - GASTROINTESTINAL
The access site was successfully closed using a (HEMOSTAT CMPR VASC REG 24CM) closure device. details… detailed exam

## 2024-04-01 ENCOUNTER — APPOINTMENT (OUTPATIENT)
Dept: PULMONOLOGY | Facility: CLINIC | Age: 69
End: 2024-04-01
Payer: MEDICARE

## 2024-04-01 VITALS
DIASTOLIC BLOOD PRESSURE: 86 MMHG | SYSTOLIC BLOOD PRESSURE: 128 MMHG | HEART RATE: 70 BPM | RESPIRATION RATE: 16 BRPM | OXYGEN SATURATION: 95 %

## 2024-04-01 VITALS — BODY MASS INDEX: 43.01 KG/M2 | WEIGHT: 255 LBS | HEIGHT: 64.5 IN

## 2024-04-01 PROCEDURE — 99214 OFFICE O/P EST MOD 30 MIN: CPT | Mod: 25

## 2024-04-01 PROCEDURE — 94010 BREATHING CAPACITY TEST: CPT

## 2024-04-01 RX ORDER — DOXYCYCLINE HYCLATE 100 MG/1
100 TABLET ORAL
Qty: 14 | Refills: 0 | Status: DISCONTINUED | COMMUNITY
Start: 2023-12-27 | End: 2024-04-01

## 2024-04-01 RX ORDER — BUDESONIDE AND FORMOTEROL FUMARATE DIHYDRATE 160; 4.5 UG/1; UG/1
160-4.5 AEROSOL RESPIRATORY (INHALATION)
Qty: 1 | Refills: 5 | Status: ACTIVE | COMMUNITY
Start: 2024-04-01 | End: 1900-01-01

## 2024-04-01 RX ORDER — PREDNISONE 10 MG/1
10 TABLET ORAL
Qty: 30 | Refills: 0 | Status: DISCONTINUED | COMMUNITY
Start: 2023-12-27 | End: 2024-04-01

## 2024-04-01 RX ORDER — FLUTICASONE FUROATE, UMECLIDINIUM BROMIDE AND VILANTEROL TRIFENATATE 100; 62.5; 25 UG/1; UG/1; UG/1
100-62.5-25 POWDER RESPIRATORY (INHALATION)
Qty: 1 | Refills: 5 | Status: DISCONTINUED | COMMUNITY
Start: 2024-01-11 | End: 2024-04-01

## 2024-04-01 NOTE — REVIEW OF SYSTEMS
[TextEntry] : Constitutional, HEENT, cardiovascular, respiratory, gastrointestinal, genitourinary, musculoskeletal, neurological, endocrine review of systems are otherwise negative except as noted in the history of present illness. 5

## 2024-04-01 NOTE — ASSESSMENT
[FreeTextEntry1] : 68M PMH moderate COPD, MARY on APAP, CAD, HTN, HLD who presents for f/u visit.  - Visit 01/2024 was started on Trelegy 100.  - However this is not helping his symptoms and is cost-prohibitive for him - Given sample of Trelegy 200 x1 - Will do trial of generic Symbicort 160 - Advised to rinse mouth after each use - Spirometry today showed FEV1/FVC 73%, FEV1 67%, FVC 71%. In 04/2022 his FEV1 was 85.8%.  - This is a decrement in FEV1 by 18.8%, now in moderate range - He does not qualify for lung CA screening - Will f/u in 4 mo time to re-assess  The patient expressed understanding and agreement with the plan as outlined above and accepts responsibility to be compliant with any recommended testing, treatment, and follow-up visits.  All relevant questions and concerns were addressed.  30 minutes of time were spent on the encounter. Medical records were reviewed, including but not limited to hospital records, outpatient records, laboratory data, and diagnostic imaging studies. Greater than 50% of the face-to-face encounter time was spent on counseling and/or coordination of care.  Celestino Mas M.D. Pulmonary & Critical Care Medicine [facility] at [place] [address] [place] [address] T: [phone] F: [phone]

## 2024-04-01 NOTE — HISTORY OF PRESENT ILLNESS
[Former] : former [TextBox_4] : 68M PMH moderate COPD, MARY on APAP, CAD, HTN, HLD who presents for f/u visit. Visit 01/2024 was started on Trelegy 100. Spirometry today showed FEV1/FVC 73%, FEV1 67%, FVC 71%. In 04/2022 his FEV1 was 85.8%. He does not feel the Trelegy is very helpful. No recent exacerbations.  [TextBox_11] : 1 [TextBox_13] : 25 [YearQuit] : 1990s

## 2024-04-04 ENCOUNTER — OFFICE (OUTPATIENT)
Dept: URBAN - METROPOLITAN AREA CLINIC 104 | Facility: CLINIC | Age: 69
Setting detail: OPHTHALMOLOGY
End: 2024-04-04
Payer: MEDICARE

## 2024-04-04 ENCOUNTER — RX ONLY (RX ONLY)
Age: 69
End: 2024-04-04

## 2024-04-04 DIAGNOSIS — H26.491: ICD-10-CM

## 2024-04-04 DIAGNOSIS — H01.001: ICD-10-CM

## 2024-04-04 DIAGNOSIS — H26.493: ICD-10-CM

## 2024-04-04 DIAGNOSIS — H01.002: ICD-10-CM

## 2024-04-04 DIAGNOSIS — H18.513: ICD-10-CM

## 2024-04-04 PROCEDURE — 66821 AFTER CATARACT LASER SURGERY: CPT | Mod: RT | Performed by: OPHTHALMOLOGY

## 2024-04-04 PROCEDURE — 92014 COMPRE OPH EXAM EST PT 1/>: CPT | Mod: 57 | Performed by: OPHTHALMOLOGY

## 2024-04-04 ASSESSMENT — LID POSITION - DERMATOCHALASIS
OD_DERMATOCHALASIS: RUL 1+
OS_DERMATOCHALASIS: LUL 1+

## 2024-04-04 ASSESSMENT — LID EXAM ASSESSMENTS
OS_BLEPHARITIS: LLL LUL 2+
OD_BLEPHARITIS: RLL RUL 2+

## 2024-04-26 ENCOUNTER — OFFICE (OUTPATIENT)
Dept: URBAN - METROPOLITAN AREA CLINIC 104 | Facility: CLINIC | Age: 69
Setting detail: OPHTHALMOLOGY
End: 2024-04-26
Payer: MEDICARE

## 2024-04-26 DIAGNOSIS — H43.391: ICD-10-CM

## 2024-04-26 DIAGNOSIS — H18.513: ICD-10-CM

## 2024-04-26 DIAGNOSIS — Z96.1: ICD-10-CM

## 2024-04-26 DIAGNOSIS — H01.002: ICD-10-CM

## 2024-04-26 DIAGNOSIS — H01.001: ICD-10-CM

## 2024-04-26 DIAGNOSIS — H01.004: ICD-10-CM

## 2024-04-26 DIAGNOSIS — H01.005: ICD-10-CM

## 2024-04-26 DIAGNOSIS — H26.493: ICD-10-CM

## 2024-04-26 DIAGNOSIS — H18.523: ICD-10-CM

## 2024-04-26 DIAGNOSIS — H02.831: ICD-10-CM

## 2024-04-26 DIAGNOSIS — H02.834: ICD-10-CM

## 2024-04-26 PROCEDURE — 99024 POSTOP FOLLOW-UP VISIT: CPT | Performed by: OPHTHALMOLOGY

## 2024-04-26 ASSESSMENT — LID EXAM ASSESSMENTS
OD_BLEPHARITIS: RLL RUL 2+
OS_BLEPHARITIS: LLL LUL 2+

## 2024-04-26 ASSESSMENT — LID POSITION - DERMATOCHALASIS
OD_DERMATOCHALASIS: RUL 1+
OS_DERMATOCHALASIS: LUL 1+

## 2024-05-15 ENCOUNTER — APPOINTMENT (OUTPATIENT)
Dept: CARDIOLOGY | Facility: CLINIC | Age: 69
End: 2024-05-15
Payer: MEDICARE

## 2024-05-15 ENCOUNTER — NON-APPOINTMENT (OUTPATIENT)
Age: 69
End: 2024-05-15

## 2024-05-15 VITALS
RESPIRATION RATE: 16 BRPM | HEART RATE: 56 BPM | BODY MASS INDEX: 43.01 KG/M2 | OXYGEN SATURATION: 97 % | SYSTOLIC BLOOD PRESSURE: 110 MMHG | DIASTOLIC BLOOD PRESSURE: 80 MMHG | HEIGHT: 64.5 IN | WEIGHT: 255 LBS

## 2024-05-15 DIAGNOSIS — R94.31 ABNORMAL ELECTROCARDIOGRAM [ECG] [EKG]: ICD-10-CM

## 2024-05-15 DIAGNOSIS — I77.810 THORACIC AORTIC ECTASIA: ICD-10-CM

## 2024-05-15 DIAGNOSIS — I49.3 VENTRICULAR PREMATURE DEPOLARIZATION: ICD-10-CM

## 2024-05-15 DIAGNOSIS — I51.7 CARDIOMEGALY: ICD-10-CM

## 2024-05-15 DIAGNOSIS — I10 ESSENTIAL (PRIMARY) HYPERTENSION: ICD-10-CM

## 2024-05-15 DIAGNOSIS — G47.33 OBSTRUCTIVE SLEEP APNEA (ADULT) (PEDIATRIC): ICD-10-CM

## 2024-05-15 DIAGNOSIS — E78.5 HYPERLIPIDEMIA, UNSPECIFIED: ICD-10-CM

## 2024-05-15 DIAGNOSIS — I35.1 NONRHEUMATIC AORTIC (VALVE) INSUFFICIENCY: ICD-10-CM

## 2024-05-15 DIAGNOSIS — I65.29 OCCLUSION AND STENOSIS OF UNSPECIFIED CAROTID ARTERY: ICD-10-CM

## 2024-05-15 DIAGNOSIS — E66.9 OBESITY, UNSPECIFIED: ICD-10-CM

## 2024-05-15 PROCEDURE — 99214 OFFICE O/P EST MOD 30 MIN: CPT

## 2024-05-15 PROCEDURE — G2211 COMPLEX E/M VISIT ADD ON: CPT

## 2024-05-15 PROCEDURE — 93000 ELECTROCARDIOGRAM COMPLETE: CPT

## 2024-05-15 NOTE — DISCUSSION/SUMMARY
[EKG obtained to assist in diagnosis and management of assessed problem(s)] : EKG obtained to assist in diagnosis and management of assessed problem(s) [FreeTextEntry1] : Patient is a 68yo M with mild non-obstructive CAD (coronary calcifications seen on CT), HTN,HLD, obesity MARY on CPAP, PVCs, right knee replacement. mild carotid stenosis here for cardiac follow up. -Carotid with stable mild disease 1/2024, med management -Echo 1/2024 with LVH, normal function, diastolic dysfunction with normal LAP and no PHTN -Nuclear stress testing 1/2021 without ischemia  -Breathing issues appear all related to his pulmonary disease and weight, not HFpEF   -Limited in activity now due to left knee, may need replaced as had right done but he is anxious    1.  CV stable, continue aggressive risk factor modification  2. Increase physical activity as tolerated  3. Recommend aggressive diet and lifestyle modifications, counselled on weight loss. Not amenable to GLP1 agonist 4. Recommend 30 minutes moderate intensity aerobic activity 5 days per week as tolerated by knees 5. CPAP for MARY, pulm follow up for this and COPD , recently put on Symbicort, sees Dr Mas  6. Continue medical management of CAD/carotid stenosis. No new symptoms at this time , lipids at goal 9/2023 on current atorvastatin 40mg qhs  7. Regular urology follow up with Dr Funk 8. Follow up 1 year , will need testing the for his carotid disease and given COPD/MARY to eval PHTN 9. Regular ophtho follow up with Dr Hill

## 2024-05-15 NOTE — HISTORY OF PRESENT ILLNESS
[FreeTextEntry1] : Patient is a 68yo M with h/o mild CAD, HTN, COPD, MRAY on CPAP, knee replacement,  PVCs, here for cardiac follow up.   Using CPAP at night without O2, autoregulated. No CP, still with chronic dyspnea and fatigue. More limited by knees Patient denies PND/orthopnea/edema/palpitations/syncope/claudication.   Works at [employer] nights part time. Daytime occasionally works as .  Helps with 5 grandchildren as well.   ROS: GI and  negative

## 2024-05-17 ENCOUNTER — OFFICE (OUTPATIENT)
Dept: URBAN - METROPOLITAN AREA CLINIC 104 | Facility: CLINIC | Age: 69
Setting detail: OPHTHALMOLOGY
End: 2024-05-17
Payer: MEDICARE

## 2024-05-17 DIAGNOSIS — H01.001: ICD-10-CM

## 2024-05-17 DIAGNOSIS — H01.002: ICD-10-CM

## 2024-05-17 DIAGNOSIS — H26.493: ICD-10-CM

## 2024-05-17 DIAGNOSIS — H01.004: ICD-10-CM

## 2024-05-17 DIAGNOSIS — H18.523: ICD-10-CM

## 2024-05-17 DIAGNOSIS — H02.831: ICD-10-CM

## 2024-05-17 DIAGNOSIS — H02.834: ICD-10-CM

## 2024-05-17 DIAGNOSIS — H43.391: ICD-10-CM

## 2024-05-17 DIAGNOSIS — H18.513: ICD-10-CM

## 2024-05-17 DIAGNOSIS — Z96.1: ICD-10-CM

## 2024-05-17 DIAGNOSIS — H52.4: ICD-10-CM

## 2024-05-17 DIAGNOSIS — H01.005: ICD-10-CM

## 2024-05-17 PROCEDURE — 99213 OFFICE O/P EST LOW 20 MIN: CPT | Performed by: OPHTHALMOLOGY

## 2024-05-17 PROCEDURE — 92015 DETERMINE REFRACTIVE STATE: CPT | Performed by: OPHTHALMOLOGY

## 2024-05-17 PROCEDURE — 92286 ANT SGM IMG I&R SPECLR MIC: CPT | Performed by: OPHTHALMOLOGY

## 2024-05-17 ASSESSMENT — CONFRONTATIONAL VISUAL FIELD TEST (CVF)
OS_FINDINGS: FULL
OD_FINDINGS: FULL

## 2024-05-17 ASSESSMENT — LID EXAM ASSESSMENTS
OS_BLEPHARITIS: LLL LUL 2+
OD_BLEPHARITIS: RLL RUL 2+

## 2024-05-17 ASSESSMENT — LID POSITION - DERMATOCHALASIS
OD_DERMATOCHALASIS: RUL 1+
OS_DERMATOCHALASIS: LUL 1+

## 2024-06-24 ENCOUNTER — OFFICE (OUTPATIENT)
Dept: URBAN - METROPOLITAN AREA CLINIC 104 | Facility: CLINIC | Age: 69
Setting detail: OPHTHALMOLOGY
End: 2024-06-24
Payer: MEDICARE

## 2024-06-24 ENCOUNTER — RX ONLY (RX ONLY)
Age: 69
End: 2024-06-24

## 2024-06-24 DIAGNOSIS — H26.493: ICD-10-CM

## 2024-06-24 DIAGNOSIS — Z96.1: ICD-10-CM

## 2024-06-24 DIAGNOSIS — H01.002: ICD-10-CM

## 2024-06-24 DIAGNOSIS — H01.005: ICD-10-CM

## 2024-06-24 DIAGNOSIS — H18.523: ICD-10-CM

## 2024-06-24 DIAGNOSIS — H01.001: ICD-10-CM

## 2024-06-24 DIAGNOSIS — H02.831: ICD-10-CM

## 2024-06-24 DIAGNOSIS — H18.513: ICD-10-CM

## 2024-06-24 DIAGNOSIS — H01.004: ICD-10-CM

## 2024-06-24 DIAGNOSIS — H02.834: ICD-10-CM

## 2024-06-24 DIAGNOSIS — H43.391: ICD-10-CM

## 2024-06-24 PROCEDURE — 99024 POSTOP FOLLOW-UP VISIT: CPT | Performed by: OPHTHALMOLOGY

## 2024-06-24 ASSESSMENT — CONFRONTATIONAL VISUAL FIELD TEST (CVF)
OD_FINDINGS: FULL
OS_FINDINGS: FULL

## 2024-06-24 ASSESSMENT — LID POSITION - DERMATOCHALASIS
OD_DERMATOCHALASIS: RUL 1+
OS_DERMATOCHALASIS: LUL 1+

## 2024-06-24 ASSESSMENT — LID EXAM ASSESSMENTS
OD_BLEPHARITIS: RLL RUL 2+
OS_BLEPHARITIS: LLL LUL 2+

## 2024-07-05 ENCOUNTER — RX RENEWAL (OUTPATIENT)
Age: 69
End: 2024-07-05

## 2024-08-05 ENCOUNTER — APPOINTMENT (OUTPATIENT)
Dept: PULMONOLOGY | Facility: CLINIC | Age: 69
End: 2024-08-05

## 2024-08-05 PROCEDURE — G2211 COMPLEX E/M VISIT ADD ON: CPT

## 2024-08-05 PROCEDURE — 99213 OFFICE O/P EST LOW 20 MIN: CPT

## 2024-08-05 NOTE — ASSESSMENT
[FreeTextEntry1] : 69M PMH moderate COPD, MARY on APAP, CAD, HTN, HLD who presents for f/u visit.  - Refilled generic Symbicort 160 - No recent flare-ups - Lungs clear on exam - F/u in 4 mo time  The patient expressed understanding and agreement with the plan as outlined above and accepts responsibility to be compliant with any recommended testing, treatment, and follow-up visits.  All relevant questions and concerns were addressed.  25 minutes of time were spent on the encounter. Medical records were reviewed, including but not limited to hospital records, outpatient records, laboratory data, and diagnostic imaging studies. Greater than 50% of the face-to-face encounter time was spent on counseling and/or coordination of care.  Celestino Mas MD, [facility] Pulmonary & Critical Care Medicine [facility] Pul[facility] at [place] [address] [place], [place] [address] T: [phone] F: [phone]

## 2024-08-05 NOTE — HISTORY OF PRESENT ILLNESS
[Former] : former [TextBox_4] : 69M PMH moderate COPD, MARY on APAP, CAD, HTN, HLD who presents for f/u visit. He is doing well on generic Symbicort. Rarely using albuterol. Denies worsening sputum production - does have productive cough primarily in the mornings. No recent hospitalizations. Recently treated for UTI. [TextBox_11] : 1 [TextBox_13] : 25 [YearQuit] : 1990

## 2024-08-05 NOTE — REVIEW OF SYSTEMS
[TextEntry] : Constitutional, HEENT, cardiovascular, respiratory, gastrointestinal, genitourinary, musculoskeletal, neurological, endocrine review of systems are otherwise negative except as noted in the history of present illness..mjlross

## 2024-08-09 ENCOUNTER — RX RENEWAL (OUTPATIENT)
Age: 69
End: 2024-08-09

## 2024-08-21 ENCOUNTER — APPOINTMENT (OUTPATIENT)
Dept: PULMONOLOGY | Facility: CLINIC | Age: 69
End: 2024-08-21
Payer: MEDICARE

## 2024-08-21 VITALS
DIASTOLIC BLOOD PRESSURE: 90 MMHG | HEIGHT: 65 IN | RESPIRATION RATE: 16 BRPM | WEIGHT: 251 LBS | BODY MASS INDEX: 41.82 KG/M2 | HEART RATE: 70 BPM | SYSTOLIC BLOOD PRESSURE: 138 MMHG | OXYGEN SATURATION: 96 %

## 2024-08-21 DIAGNOSIS — E66.9 OBESITY, UNSPECIFIED: ICD-10-CM

## 2024-08-21 DIAGNOSIS — J44.9 CHRONIC OBSTRUCTIVE PULMONARY DISEASE, UNSPECIFIED: ICD-10-CM

## 2024-08-21 DIAGNOSIS — G47.33 OBSTRUCTIVE SLEEP APNEA (ADULT) (PEDIATRIC): ICD-10-CM

## 2024-08-21 PROCEDURE — G2211 COMPLEX E/M VISIT ADD ON: CPT

## 2024-08-21 PROCEDURE — 99214 OFFICE O/P EST MOD 30 MIN: CPT

## 2024-08-21 NOTE — ASSESSMENT
[FreeTextEntry1] : Severe MARY. The patient is using BPAP well, is compliant with it's use and getting clinical benefit. The patient should continue to use BPAP.

## 2024-08-21 NOTE — PHYSICAL EXAM
[General Appearance - In No Acute Distress] : no acute distress [Normal Oropharynx] : abnormal oropharynx [Low Lying Soft Palate] : low lying soft palate [Elongated Uvula] : elongated uvula [Enlarged Base of the Tongue] : enlargement of the base of the tongue [III] : III [Heart Rate And Rhythm] : heart rate was normal and rhythm regular [] : no respiratory distress [Respiration, Rhythm And Depth] : normal respiratory rhythm and effort [Exaggerated Use Of Accessory Muscles For Inspiration] : no accessory muscle use [Auscultation Breath Sounds / Voice Sounds] : lungs were clear to auscultation bilaterally [Skin Color & Pigmentation] : normal skin color and pigmentation

## 2024-08-21 NOTE — HISTORY OF PRESENT ILLNESS
[Obstructive Sleep Apnea] : obstructive sleep apnea [Home] : home [BPAP:] : BPAP [TextBox_100] : 2/17 [TextBox_108] : 115 [TextBox_112] : 97 [TextBox_116] : 77 [TextBox_135] : auto  pap max 20, epap min 8 ps5 [TextBox_133] : 100 [TextBox_137] : 100 [TextBox_147] : 2.6 [TextBox_158] : [employer] [TextBox_165] : Alert on his new BiPAP.  Knee replacement on hold.  May be going for cataract surgery in the near future.  Mild COPD not on medications. [FreeTextEntry1] : Hx severe MARY.  Was seeing Dr Daugherty.  On autoBPAP; got latest machine 9/12/22. Doing well on machine. Compliance excellent. Feels better using it; will not sleep w/o it.   Using FFM. Has [employer].  Sees Dr Mas for COPD.  Still works; at [employer].

## 2024-08-26 ENCOUNTER — OFFICE (OUTPATIENT)
Dept: URBAN - METROPOLITAN AREA CLINIC 104 | Facility: CLINIC | Age: 69
Setting detail: OPHTHALMOLOGY
End: 2024-08-26
Payer: MEDICARE

## 2024-08-26 DIAGNOSIS — H26.493: ICD-10-CM

## 2024-08-26 DIAGNOSIS — H01.004: ICD-10-CM

## 2024-08-26 DIAGNOSIS — H01.005: ICD-10-CM

## 2024-08-26 DIAGNOSIS — H43.391: ICD-10-CM

## 2024-08-26 DIAGNOSIS — H02.831: ICD-10-CM

## 2024-08-26 DIAGNOSIS — H01.001: ICD-10-CM

## 2024-08-26 DIAGNOSIS — H02.834: ICD-10-CM

## 2024-08-26 DIAGNOSIS — H01.002: ICD-10-CM

## 2024-08-26 DIAGNOSIS — H18.523: ICD-10-CM

## 2024-08-26 DIAGNOSIS — H18.513: ICD-10-CM

## 2024-08-26 PROCEDURE — 99213 OFFICE O/P EST LOW 20 MIN: CPT | Performed by: OPHTHALMOLOGY

## 2024-08-26 ASSESSMENT — LID EXAM ASSESSMENTS
OD_BLEPHARITIS: RLL RUL 2+
OS_BLEPHARITIS: LLL LUL 2+

## 2024-08-26 ASSESSMENT — LID POSITION - DERMATOCHALASIS
OD_DERMATOCHALASIS: RUL 1+
OS_DERMATOCHALASIS: LUL 1+

## 2024-08-26 ASSESSMENT — CONFRONTATIONAL VISUAL FIELD TEST (CVF)
OS_FINDINGS: FULL
OD_FINDINGS: FULL

## 2024-12-09 ENCOUNTER — APPOINTMENT (OUTPATIENT)
Dept: PULMONOLOGY | Facility: CLINIC | Age: 69
End: 2024-12-09
Payer: MEDICARE

## 2024-12-09 ENCOUNTER — RX CHANGE (OUTPATIENT)
Age: 69
End: 2024-12-09

## 2024-12-09 VITALS
HEIGHT: 65 IN | DIASTOLIC BLOOD PRESSURE: 81 MMHG | RESPIRATION RATE: 16 BRPM | HEART RATE: 62 BPM | OXYGEN SATURATION: 95 % | SYSTOLIC BLOOD PRESSURE: 143 MMHG

## 2024-12-09 PROCEDURE — 99213 OFFICE O/P EST LOW 20 MIN: CPT

## 2024-12-09 PROCEDURE — G2211 COMPLEX E/M VISIT ADD ON: CPT

## 2024-12-09 RX ORDER — ALBUTEROL SULFATE 90 UG/1
108 (90 BASE) AEROSOL, METERED RESPIRATORY (INHALATION)
Qty: 1 | Refills: 11 | Status: ACTIVE | COMMUNITY
Start: 1900-01-01 | End: 1900-01-01

## 2025-01-02 ENCOUNTER — RX RENEWAL (OUTPATIENT)
Age: 70
End: 2025-01-02

## 2025-02-10 NOTE — AIRWAY REMOVAL NOTE  ADULT & PEDS - O2 DELIVERY METHOD
Discussed with patient, she wishes to proceed with pacemaker placement this afternoon.     We discussed risk, benefits, and alternative treatment options.      Will plan for pacemaker placement this afternoon.    Would prefer to send her home and bring back for AV node ablation.  However she has had multiple admissions and readmissions for [facility].  So we will consider keeping and doing AV node ablation on Wednesday prior to discharge.    Will reassess post pacemaker for timing of AV node ablation.      BiPAP/CPAP

## 2025-02-21 ENCOUNTER — OFFICE (OUTPATIENT)
Dept: URBAN - METROPOLITAN AREA CLINIC 104 | Facility: CLINIC | Age: 70
Setting detail: OPHTHALMOLOGY
End: 2025-02-21
Payer: MEDICARE

## 2025-02-21 DIAGNOSIS — H02.831: ICD-10-CM

## 2025-02-21 DIAGNOSIS — H01.001: ICD-10-CM

## 2025-02-21 DIAGNOSIS — H02.834: ICD-10-CM

## 2025-02-21 DIAGNOSIS — H18.523: ICD-10-CM

## 2025-02-21 DIAGNOSIS — H01.004: ICD-10-CM

## 2025-02-21 DIAGNOSIS — H01.005: ICD-10-CM

## 2025-02-21 DIAGNOSIS — H26.493: ICD-10-CM

## 2025-02-21 DIAGNOSIS — H18.513: ICD-10-CM

## 2025-02-21 DIAGNOSIS — H01.002: ICD-10-CM

## 2025-02-21 DIAGNOSIS — Z96.1: ICD-10-CM

## 2025-02-21 DIAGNOSIS — H43.391: ICD-10-CM

## 2025-02-21 PROCEDURE — 99213 OFFICE O/P EST LOW 20 MIN: CPT | Performed by: OPHTHALMOLOGY

## 2025-02-21 ASSESSMENT — CORNEAL EDEMA CLINICAL DESCRIPTION: OD_CORNEALEDEMA: TEMPORALLY

## 2025-02-21 ASSESSMENT — LID POSITION - DERMATOCHALASIS
OD_DERMATOCHALASIS: RUL 1+
OS_DERMATOCHALASIS: LUL 1+

## 2025-02-21 ASSESSMENT — LID EXAM ASSESSMENTS
OS_BLEPHARITIS: LLL LUL 2+
OD_BLEPHARITIS: RLL RUL 2+

## 2025-02-21 ASSESSMENT — REFRACTION_CURRENTRX
OD_OVR_VA: 20/
OS_OVR_VA: 20/

## 2025-02-21 ASSESSMENT — CORNEAL DYSTROPHY - POSTERIOR
OS_POSTERIORDYSTROPHY: 1+ GUTTATA
OD_POSTERIORDYSTROPHY: 1+ GUTTATA

## 2025-02-21 ASSESSMENT — CORNEAL EDEMA - MICROCYSTIC EPITHELIAL EDEMA (MCE): OD_MCE: 1+ 2+

## 2025-02-21 ASSESSMENT — VISUAL ACUITY
OS_BCVA: 20/200
OD_BCVA: 20/25

## 2025-02-21 ASSESSMENT — CONFRONTATIONAL VISUAL FIELD TEST (CVF)
OS_FINDINGS: FULL
OD_FINDINGS: FULL

## 2025-02-21 ASSESSMENT — REFRACTION_AUTOREFRACTION: OD_SPHERE: UNABLE

## 2025-02-21 ASSESSMENT — CORNEAL EDEMA - FOLDS/STRIAE: OD_FOLDSSTRIAE: T

## 2025-03-24 ENCOUNTER — OFFICE (OUTPATIENT)
Dept: URBAN - METROPOLITAN AREA CLINIC 104 | Facility: CLINIC | Age: 70
Setting detail: OPHTHALMOLOGY
End: 2025-03-24
Payer: MEDICARE

## 2025-03-24 DIAGNOSIS — H01.002: ICD-10-CM

## 2025-03-24 DIAGNOSIS — H01.005: ICD-10-CM

## 2025-03-24 DIAGNOSIS — H01.001: ICD-10-CM

## 2025-03-24 DIAGNOSIS — H26.493: ICD-10-CM

## 2025-03-24 DIAGNOSIS — Z96.1: ICD-10-CM

## 2025-03-24 DIAGNOSIS — H01.004: ICD-10-CM

## 2025-03-24 DIAGNOSIS — H43.391: ICD-10-CM

## 2025-03-24 DIAGNOSIS — H02.831: ICD-10-CM

## 2025-03-24 DIAGNOSIS — H18.523: ICD-10-CM

## 2025-03-24 DIAGNOSIS — H02.834: ICD-10-CM

## 2025-03-24 DIAGNOSIS — H18.513: ICD-10-CM

## 2025-03-24 PROCEDURE — 92286 ANT SGM IMG I&R SPECLR MIC: CPT | Performed by: OPHTHALMOLOGY

## 2025-03-24 PROCEDURE — 99213 OFFICE O/P EST LOW 20 MIN: CPT | Performed by: OPHTHALMOLOGY

## 2025-03-24 ASSESSMENT — KERATOMETRY
OS_K1POWER_DIOPTERS: 42.51
OS_AXISANGLE_DEGREES: 156
OD_AXISANGLE_DEGREES: 172
OS_K2POWER_DIOPTERS: 3.38
OD_K1POWER_DIOPTERS: 44.12
OD_K2POWER_DIOPTERS: 47.80

## 2025-03-24 ASSESSMENT — LID EXAM ASSESSMENTS
OD_BLEPHARITIS: RLL RUL 2+
OS_BLEPHARITIS: LLL LUL 2+

## 2025-03-24 ASSESSMENT — CONFRONTATIONAL VISUAL FIELD TEST (CVF)
OD_FINDINGS: FULL
OS_FINDINGS: FULL

## 2025-03-24 ASSESSMENT — REFRACTION_AUTOREFRACTION
OD_SPHERE: UNABLE
OS_SPHERE: +1.25
OS_CYLINDER: -1.75
OS_AXIS: 084

## 2025-03-24 ASSESSMENT — CORNEAL DYSTROPHY - POSTERIOR
OD_POSTERIORDYSTROPHY: 1+ GUTTATA
OS_POSTERIORDYSTROPHY: 1+ GUTTATA

## 2025-03-24 ASSESSMENT — CORNEAL EDEMA - MICROCYSTIC EPITHELIAL EDEMA (MCE): OD_MCE: 2+

## 2025-03-24 ASSESSMENT — VISUAL ACUITY
OD_BCVA: 20/25-1
OS_BCVA: 20/80

## 2025-03-24 ASSESSMENT — LID POSITION - DERMATOCHALASIS
OS_DERMATOCHALASIS: LUL 1+
OD_DERMATOCHALASIS: RUL 1+

## 2025-03-24 ASSESSMENT — CORNEAL EDEMA CLINICAL DESCRIPTION: OD_CORNEALEDEMA: TEMPORALLY

## 2025-03-24 ASSESSMENT — CORNEAL EDEMA - FOLDS/STRIAE: OD_FOLDSSTRIAE: T

## 2025-03-31 NOTE — PHYSICAL EXAM
Patient's first and last name, , procedure, and correct site confirmed prior to the start of procedure. [FreeTextEntry1] : morbidly obese [Normal Conjunctiva] : the conjunctiva exhibited no abnormalities [Eyelids - No Xanthelasma] : the eyelids demonstrated no xanthelasmas [Low Lying Soft Palate] : low lying soft palate [Elongated Uvula] : elongated uvula [Enlarged Base of the Tongue] : enlargement of the base of the tongue [IV] : IV [Neck Circumference: ___] : neck circumference is [unfilled] [Heart Rate And Rhythm] : heart rate and rhythm were normal [Heart Sounds] : normal S1 and S2 [Murmurs] : no murmurs present [Abdomen Soft] : soft [Abdomen Tenderness] : non-tender [Abdomen Mass (___ Cm)] : no abdominal mass palpated [Abnormal Walk] : normal gait [Gait - Sufficient For Exercise Testing] : the gait was sufficient for exercise testing [Nail Clubbing] : no clubbing of the fingernails [Cyanosis, Localized] : no localized cyanosis [Petechial Hemorrhages (___cm)] : no petechial hemorrhages [1+ Pitting] : 1+  pitting [Skin Color & Pigmentation] : normal skin color and pigmentation [] : no rash [No Venous Stasis] : no venous stasis [Skin Lesions] : no skin lesions [No Skin Ulcers] : no skin ulcer [No Xanthoma] : no  xanthoma was observed [Deep Tendon Reflexes (DTR)] : deep tendon reflexes were 2+ and symmetric [Sensation] : the sensory exam was normal to light touch and pinprick [No Focal Deficits] : no focal deficits [Oriented To Time, Place, And Person] : oriented to person, place, and time [Impaired Insight] : insight and judgment were intact [Affect] : the affect was normal [Normal Rate] : the respiratory rate was normal [Normal Rhythm/Effort] : normal respiratory rhythm and effort [Clear Bilaterally] : the lungs were clear to auscultation bilaterally [Normal to Percussion] : the lungs were normal to percussion [Normal] : palpation of the chest was normal

## 2025-04-21 ENCOUNTER — RX ONLY (RX ONLY)
Age: 70
End: 2025-04-21

## 2025-04-21 ENCOUNTER — OFFICE (OUTPATIENT)
Dept: URBAN - METROPOLITAN AREA CLINIC 104 | Facility: CLINIC | Age: 70
Setting detail: OPHTHALMOLOGY
End: 2025-04-21
Payer: MEDICARE

## 2025-04-21 DIAGNOSIS — H02.831: ICD-10-CM

## 2025-04-21 DIAGNOSIS — H02.834: ICD-10-CM

## 2025-04-21 DIAGNOSIS — H26.493: ICD-10-CM

## 2025-04-21 DIAGNOSIS — H18.523: ICD-10-CM

## 2025-04-21 DIAGNOSIS — H43.391: ICD-10-CM

## 2025-04-21 DIAGNOSIS — H01.001: ICD-10-CM

## 2025-04-21 DIAGNOSIS — H18.513: ICD-10-CM

## 2025-04-21 DIAGNOSIS — H01.005: ICD-10-CM

## 2025-04-21 DIAGNOSIS — H01.002: ICD-10-CM

## 2025-04-21 DIAGNOSIS — Z96.1: ICD-10-CM

## 2025-04-21 DIAGNOSIS — H01.004: ICD-10-CM

## 2025-04-21 PROCEDURE — 99213 OFFICE O/P EST LOW 20 MIN: CPT | Performed by: OPHTHALMOLOGY

## 2025-04-21 ASSESSMENT — CONFRONTATIONAL VISUAL FIELD TEST (CVF)
OS_FINDINGS: FULL
OD_FINDINGS: FULL

## 2025-04-21 ASSESSMENT — VISUAL ACUITY
OS_BCVA: 20/FC
OD_BCVA: 20/25

## 2025-04-21 ASSESSMENT — LID POSITION - DERMATOCHALASIS
OS_DERMATOCHALASIS: LUL 1+
OD_DERMATOCHALASIS: RUL 1+

## 2025-04-21 ASSESSMENT — CORNEAL DYSTROPHY - POSTERIOR
OD_POSTERIORDYSTROPHY: 1+ GUTTATA
OS_POSTERIORDYSTROPHY: 1+ GUTTATA

## 2025-04-21 ASSESSMENT — LID EXAM ASSESSMENTS
OS_BLEPHARITIS: LLL LUL 2+
OD_BLEPHARITIS: RLL RUL 2+

## 2025-04-21 ASSESSMENT — CORNEAL EDEMA - MICROCYSTIC EPITHELIAL EDEMA (MCE): OD_MCE: 2+

## 2025-04-21 ASSESSMENT — CORNEAL EDEMA CLINICAL DESCRIPTION: OD_CORNEALEDEMA: TEMPORALLY

## 2025-04-21 ASSESSMENT — CORNEAL EDEMA - FOLDS/STRIAE: OD_FOLDSSTRIAE: T

## 2025-04-25 ENCOUNTER — APPOINTMENT (OUTPATIENT)
Dept: PULMONOLOGY | Facility: CLINIC | Age: 70
End: 2025-04-25
Payer: MEDICARE

## 2025-04-25 VITALS — WEIGHT: 254 LBS | HEIGHT: 64.5 IN | BODY MASS INDEX: 42.84 KG/M2

## 2025-04-25 VITALS
OXYGEN SATURATION: 96 % | SYSTOLIC BLOOD PRESSURE: 148 MMHG | RESPIRATION RATE: 16 BRPM | HEART RATE: 89 BPM | DIASTOLIC BLOOD PRESSURE: 82 MMHG

## 2025-04-25 PROCEDURE — 94010 BREATHING CAPACITY TEST: CPT

## 2025-04-25 PROCEDURE — 99214 OFFICE O/P EST MOD 30 MIN: CPT | Mod: 25

## 2025-04-30 LAB
BASOPHILS # BLD AUTO: 0.03 K/UL
BASOPHILS NFR BLD AUTO: 0.4 %
EOSINOPHIL # BLD AUTO: 0.28 K/UL
EOSINOPHIL NFR BLD AUTO: 3.6 %
ESTIMATED AVERAGE GLUCOSE: 126 MG/DL
HBA1C MFR BLD HPLC: 6 %
HCT VFR BLD CALC: 44.2 %
HGB BLD-MCNC: 14.1 G/DL
IMM GRANULOCYTES NFR BLD AUTO: 0.5 %
LYMPHOCYTES # BLD AUTO: 1.57 K/UL
LYMPHOCYTES NFR BLD AUTO: 19.9 %
MAN DIFF?: NORMAL
MCHC RBC-ENTMCNC: 28 PG
MCHC RBC-ENTMCNC: 31.9 G/DL
MCV RBC AUTO: 87.7 FL
MONOCYTES # BLD AUTO: 0.8 K/UL
MONOCYTES NFR BLD AUTO: 10.2 %
NEUTROPHILS # BLD AUTO: 5.15 K/UL
NEUTROPHILS NFR BLD AUTO: 65.4 %
PLATELET # BLD AUTO: 244 K/UL
RBC # BLD: 5.04 M/UL
RBC # FLD: 14.3 %
WBC # FLD AUTO: 7.87 K/UL

## 2025-05-01 LAB
ALBUMIN SERPL ELPH-MCNC: 4.3 G/DL
ALP BLD-CCNC: 115 U/L
ALT SERPL-CCNC: 50 U/L
ANION GAP SERPL CALC-SCNC: 23 MMOL/L
AST SERPL-CCNC: 53 U/L
BILIRUB SERPL-MCNC: 0.7 MG/DL
BUN SERPL-MCNC: 18 MG/DL
CALCIUM SERPL-MCNC: 9 MG/DL
CHLORIDE SERPL-SCNC: 94 MMOL/L
CHOLEST SERPL-MCNC: 122 MG/DL
CO2 SERPL-SCNC: 17 MMOL/L
CREAT SERPL-MCNC: 1.25 MG/DL
EGFRCR SERPLBLD CKD-EPI 2021: 61 ML/MIN/1.73M2
GLUCOSE SERPL-MCNC: 88 MG/DL
HDLC SERPL-MCNC: 56 MG/DL
LDLC SERPL-MCNC: 52 MG/DL
LPL SERPL-CCNC: 92 U/L
NONHDLC SERPL-MCNC: 66 MG/DL
POTASSIUM SERPL-SCNC: 4.9 MMOL/L
PROT SERPL-MCNC: 6.8 G/DL
SODIUM SERPL-SCNC: 135 MMOL/L
TRIGL SERPL-MCNC: 72 MG/DL
TSH SERPL-ACNC: 2.54 UIU/ML

## 2025-05-09 ENCOUNTER — RX RENEWAL (OUTPATIENT)
Age: 70
End: 2025-05-09

## 2025-05-14 ENCOUNTER — NON-APPOINTMENT (OUTPATIENT)
Age: 70
End: 2025-05-14

## 2025-05-14 ENCOUNTER — APPOINTMENT (OUTPATIENT)
Dept: CARDIOLOGY | Facility: CLINIC | Age: 70
End: 2025-05-14
Payer: MEDICARE

## 2025-05-14 VITALS
DIASTOLIC BLOOD PRESSURE: 78 MMHG | HEIGHT: 64 IN | HEART RATE: 58 BPM | WEIGHT: 250 LBS | BODY MASS INDEX: 42.68 KG/M2 | SYSTOLIC BLOOD PRESSURE: 148 MMHG

## 2025-05-14 DIAGNOSIS — I65.29 OCCLUSION AND STENOSIS OF UNSPECIFIED CAROTID ARTERY: ICD-10-CM

## 2025-05-14 DIAGNOSIS — I35.1 NONRHEUMATIC AORTIC (VALVE) INSUFFICIENCY: ICD-10-CM

## 2025-05-14 DIAGNOSIS — I77.810 THORACIC AORTIC ECTASIA: ICD-10-CM

## 2025-05-14 DIAGNOSIS — R06.09 OTHER FORMS OF DYSPNEA: ICD-10-CM

## 2025-05-14 DIAGNOSIS — G47.33 OBSTRUCTIVE SLEEP APNEA (ADULT) (PEDIATRIC): ICD-10-CM

## 2025-05-14 DIAGNOSIS — I10 ESSENTIAL (PRIMARY) HYPERTENSION: ICD-10-CM

## 2025-05-14 DIAGNOSIS — I25.10 ATHEROSCLEROTIC HEART DISEASE OF NATIVE CORONARY ARTERY W/OUT ANGINA PECTORIS: ICD-10-CM

## 2025-05-14 DIAGNOSIS — E66.9 OBESITY, UNSPECIFIED: ICD-10-CM

## 2025-05-14 DIAGNOSIS — E78.5 HYPERLIPIDEMIA, UNSPECIFIED: ICD-10-CM

## 2025-05-14 DIAGNOSIS — R73.03 PREDIABETES.: ICD-10-CM

## 2025-05-14 PROCEDURE — G2211 COMPLEX E/M VISIT ADD ON: CPT

## 2025-05-14 PROCEDURE — 93000 ELECTROCARDIOGRAM COMPLETE: CPT

## 2025-05-14 PROCEDURE — 99214 OFFICE O/P EST MOD 30 MIN: CPT

## 2025-05-14 RX ORDER — TIRZEPATIDE 2.5 MG/.5ML
2.5 INJECTION, SOLUTION SUBCUTANEOUS
Qty: 1 | Refills: 3 | Status: ACTIVE | COMMUNITY
Start: 2025-05-14 | End: 1900-01-01

## 2025-05-21 ENCOUNTER — NON-APPOINTMENT (OUTPATIENT)
Age: 70
End: 2025-05-21

## 2025-06-19 ENCOUNTER — APPOINTMENT (OUTPATIENT)
Dept: CARDIOLOGY | Facility: CLINIC | Age: 70
End: 2025-06-19
Payer: MEDICARE

## 2025-06-19 PROCEDURE — 93880 EXTRACRANIAL BILAT STUDY: CPT

## 2025-06-19 PROCEDURE — 93306 TTE W/DOPPLER COMPLETE: CPT

## 2025-06-23 ENCOUNTER — APPOINTMENT (OUTPATIENT)
Dept: CARDIOLOGY | Facility: CLINIC | Age: 70
End: 2025-06-23
Payer: MEDICARE

## 2025-06-23 PROCEDURE — A9500: CPT

## 2025-06-23 PROCEDURE — 78452 HT MUSCLE IMAGE SPECT MULT: CPT

## 2025-06-23 PROCEDURE — 93015 CV STRESS TEST SUPVJ I&R: CPT

## 2025-06-23 RX ORDER — REGADENOSON 0.08 MG/ML
0.4 INJECTION, SOLUTION INTRAVENOUS
Refills: 0 | Status: COMPLETED | OUTPATIENT
Start: 2025-06-23

## 2025-06-24 ENCOUNTER — APPOINTMENT (OUTPATIENT)
Dept: CARDIOLOGY | Facility: CLINIC | Age: 70
End: 2025-06-24

## 2025-06-25 ENCOUNTER — OFFICE (OUTPATIENT)
Dept: URBAN - METROPOLITAN AREA CLINIC 104 | Facility: CLINIC | Age: 70
Setting detail: OPHTHALMOLOGY
End: 2025-06-25
Payer: MEDICARE

## 2025-06-25 DIAGNOSIS — H02.831: ICD-10-CM

## 2025-06-25 DIAGNOSIS — Z96.1: ICD-10-CM

## 2025-06-25 DIAGNOSIS — H01.002: ICD-10-CM

## 2025-06-25 DIAGNOSIS — H02.834: ICD-10-CM

## 2025-06-25 DIAGNOSIS — H01.004: ICD-10-CM

## 2025-06-25 DIAGNOSIS — H43.391: ICD-10-CM

## 2025-06-25 DIAGNOSIS — H01.001: ICD-10-CM

## 2025-06-25 DIAGNOSIS — H01.005: ICD-10-CM

## 2025-06-25 DIAGNOSIS — H26.493: ICD-10-CM

## 2025-06-25 DIAGNOSIS — H18.513: ICD-10-CM

## 2025-06-25 DIAGNOSIS — H18.523: ICD-10-CM

## 2025-06-25 PROCEDURE — 99213 OFFICE O/P EST LOW 20 MIN: CPT | Performed by: OPHTHALMOLOGY

## 2025-06-25 ASSESSMENT — CORNEAL DYSTROPHY - POSTERIOR
OD_POSTERIORDYSTROPHY: 1+ GUTTATA
OS_POSTERIORDYSTROPHY: 1+ GUTTATA

## 2025-06-25 ASSESSMENT — CORNEAL EDEMA CLINICAL DESCRIPTION: OD_CORNEALEDEMA: CENTRAL

## 2025-06-25 ASSESSMENT — TONOMETRY
OS_IOP_MMHG: 10
OD_IOP_MMHG: 10

## 2025-06-25 ASSESSMENT — CONFRONTATIONAL VISUAL FIELD TEST (CVF)
OS_FINDINGS: FULL
OD_FINDINGS: FULL

## 2025-06-25 ASSESSMENT — LID EXAM ASSESSMENTS
OS_BLEPHARITIS: LLL LUL 2+
OD_BLEPHARITIS: RLL RUL 2+

## 2025-06-25 ASSESSMENT — CORNEAL EDEMA - FOLDS/STRIAE: OD_FOLDSSTRIAE: T

## 2025-06-25 ASSESSMENT — LID POSITION - DERMATOCHALASIS
OD_DERMATOCHALASIS: RUL 1+
OS_DERMATOCHALASIS: LUL 1+

## 2025-06-25 ASSESSMENT — CORNEAL EDEMA - MICROCYSTIC EPITHELIAL EDEMA (MCE): OD_MCE: 1+

## 2025-06-25 ASSESSMENT — VISUAL ACUITY
OS_BCVA: 20/200
OD_BCVA: 20/20

## 2025-07-14 ENCOUNTER — APPOINTMENT (OUTPATIENT)
Dept: CARDIOLOGY | Facility: CLINIC | Age: 70
End: 2025-07-14
Payer: MEDICARE

## 2025-07-14 VITALS
WEIGHT: 242 LBS | SYSTOLIC BLOOD PRESSURE: 132 MMHG | DIASTOLIC BLOOD PRESSURE: 82 MMHG | HEART RATE: 77 BPM | BODY MASS INDEX: 41.32 KG/M2 | HEIGHT: 64 IN

## 2025-07-14 PROCEDURE — 99214 OFFICE O/P EST MOD 30 MIN: CPT

## 2025-07-14 PROCEDURE — G2211 COMPLEX E/M VISIT ADD ON: CPT

## 2025-07-14 RX ORDER — PREDNISOLONE ACETATE 10 MG/ML
1 SUSPENSION/ DROPS OPHTHALMIC
Qty: 5 | Refills: 0 | Status: ACTIVE | COMMUNITY
Start: 2025-04-21

## 2025-07-17 ENCOUNTER — NON-APPOINTMENT (OUTPATIENT)
Age: 70
End: 2025-07-17

## 2025-07-23 NOTE — DISCHARGE NOTE PROVIDER - NSDCACTIVITY_GEN_ALL_CORE
Continued Stay SHEREE/WESLEY Assessment/Plan of Care Note         Active Substitute Decision Maker (SDM)    There are no active Substitute Decision Maker (SDM) on file.           Progress note:  Cm spoke to wife about her spouse and the recommendation of subacute rehab.   Pt has been at [facility], and wife stated she did not want to go back. They live in [place] and no snf are close , but [facility] could be an option that can take his insurance plan.  Cm explained to wife, that pt did have a long stay at acute rehab, insurance may not approve another rehab stay. If that is the case, he would have to return home with [facility] home [facility].   Wife expressed understanding.  She may look into a toilet riser/ commode.     Will need to await  acceptance from snf and  insurance auth.         Current Patient Status: Observation    See LUKASZ flowsheets for other objective data.    Disposition Recommendations:  Preliminary discharge destination:    SHEREE/WESLEY recommendation for discharge: Home care      Continued Care and Services - Admitted Since 7/22/2025    No active coordination exists for this encounter.             Prior To Hospitalization:    Living Situation: Spouse and residing at House    .  Support Systems: Family members, Significant other, Children   Home Devices/Equipment: Mobility assist device            Mobility Assist Devices: Standard walker   Type of Service Prior to Hospitalization: PT, OT, Home care               Patient/Family discharge goal (s):  Home Care              Prior Function  Bed Mobility: Minimal Assist (Min) (hospital bed) (07/23/25 1255 : David Queen, PT)     Ambulation in the Home: Supervision (Supv) (07/23/25 1255 : David Queen, PT)  Ambulation in the Community: Minimal Assist (Min) (07/23/25 1255 : David Queen, PT)    Current Function  Last Filed Values       None            Therapy Recommendations for Discharge:   PT:      Last Filed Values       None          OT:      Last  Filed Values         Value Time User    OT Discharge Needs  therapy 5 or more times per week 7/23/2025  2:59 PM Avnai Cifuentes, OTR/L          SLP:    Last Filed Values       None              Barriers to Discharge  Identified Barriers to Discharge/Transition Planning:  rehab placement.    Walking - Indoors allowed/Walking - Outdoors allowed

## 2025-07-28 ENCOUNTER — APPOINTMENT (OUTPATIENT)
Dept: PULMONOLOGY | Facility: CLINIC | Age: 70
End: 2025-07-28
Payer: MEDICARE

## 2025-07-28 VITALS
DIASTOLIC BLOOD PRESSURE: 75 MMHG | RESPIRATION RATE: 16 BRPM | OXYGEN SATURATION: 95 % | SYSTOLIC BLOOD PRESSURE: 126 MMHG | BODY MASS INDEX: 41.83 KG/M2 | HEIGHT: 64 IN | WEIGHT: 245 LBS | HEART RATE: 59 BPM

## 2025-07-28 PROCEDURE — G2211 COMPLEX E/M VISIT ADD ON: CPT

## 2025-07-28 PROCEDURE — 99214 OFFICE O/P EST MOD 30 MIN: CPT

## 2025-08-06 ENCOUNTER — APPOINTMENT (OUTPATIENT)
Dept: PULMONOLOGY | Facility: CLINIC | Age: 70
End: 2025-08-06
Payer: MEDICARE

## 2025-08-06 VITALS
RESPIRATION RATE: 16 BRPM | WEIGHT: 239 LBS | HEART RATE: 67 BPM | DIASTOLIC BLOOD PRESSURE: 76 MMHG | OXYGEN SATURATION: 96 % | BODY MASS INDEX: 40.8 KG/M2 | HEIGHT: 64 IN | SYSTOLIC BLOOD PRESSURE: 118 MMHG

## 2025-08-06 DIAGNOSIS — E66.9 OBESITY, UNSPECIFIED: ICD-10-CM

## 2025-08-06 DIAGNOSIS — G47.33 OBSTRUCTIVE SLEEP APNEA (ADULT) (PEDIATRIC): ICD-10-CM

## 2025-08-06 PROCEDURE — G2211 COMPLEX E/M VISIT ADD ON: CPT

## 2025-08-06 PROCEDURE — 99214 OFFICE O/P EST MOD 30 MIN: CPT

## 2025-08-18 ENCOUNTER — APPOINTMENT (OUTPATIENT)
Age: 70
End: 2025-08-18
Payer: MEDICARE

## 2025-08-18 DIAGNOSIS — M19.071 PRIMARY OSTEOARTHRITIS, RIGHT ANKLE AND FOOT: ICD-10-CM

## 2025-08-18 DIAGNOSIS — R23.4 CHANGES IN SKIN TEXTURE: ICD-10-CM

## 2025-08-18 DIAGNOSIS — M20.40 OTHER HAMMER TOE(S) (ACQUIRED), UNSPECIFIED FOOT: ICD-10-CM

## 2025-08-18 DIAGNOSIS — M19.072 PRIMARY OSTEOARTHRITIS, RIGHT ANKLE AND FOOT: ICD-10-CM

## 2025-08-18 PROCEDURE — 99204 OFFICE O/P NEW MOD 45 MIN: CPT | Mod: 25

## 2025-08-18 PROCEDURE — 17250 CHEM CAUT OF GRANLTJ TISSUE: CPT

## 2025-08-27 ENCOUNTER — OFFICE (OUTPATIENT)
Dept: URBAN - METROPOLITAN AREA CLINIC 104 | Facility: CLINIC | Age: 70
Setting detail: OPHTHALMOLOGY
End: 2025-08-27
Payer: MEDICARE

## 2025-08-27 DIAGNOSIS — Z96.1: ICD-10-CM

## 2025-08-27 DIAGNOSIS — H26.493: ICD-10-CM

## 2025-08-27 DIAGNOSIS — H02.834: ICD-10-CM

## 2025-08-27 DIAGNOSIS — H01.001: ICD-10-CM

## 2025-08-27 DIAGNOSIS — H43.391: ICD-10-CM

## 2025-08-27 DIAGNOSIS — H18.513: ICD-10-CM

## 2025-08-27 DIAGNOSIS — H18.523: ICD-10-CM

## 2025-08-27 DIAGNOSIS — H02.831: ICD-10-CM

## 2025-08-27 DIAGNOSIS — H01.004: ICD-10-CM

## 2025-08-27 DIAGNOSIS — H01.005: ICD-10-CM

## 2025-08-27 DIAGNOSIS — H01.002: ICD-10-CM

## 2025-08-27 PROCEDURE — 99213 OFFICE O/P EST LOW 20 MIN: CPT | Performed by: OPHTHALMOLOGY

## 2025-08-27 ASSESSMENT — LID POSITION - DERMATOCHALASIS
OS_DERMATOCHALASIS: LUL 1+
OD_DERMATOCHALASIS: RUL 1+

## 2025-08-27 ASSESSMENT — TONOMETRY
OD_IOP_MMHG: 10
OS_IOP_MMHG: 10

## 2025-08-27 ASSESSMENT — LID EXAM ASSESSMENTS
OD_BLEPHARITIS: RLL RUL 2+
OS_BLEPHARITIS: LLL LUL 2+

## 2025-08-27 ASSESSMENT — CORNEAL DYSTROPHY - POSTERIOR
OD_POSTERIORDYSTROPHY: 1+ GUTTATA
OS_POSTERIORDYSTROPHY: 1+ GUTTATA

## 2025-08-27 ASSESSMENT — CORNEAL EDEMA - MICROCYSTIC EPITHELIAL EDEMA (MCE): OD_MCE: 1+

## 2025-08-27 ASSESSMENT — VISUAL ACUITY
OS_BCVA: 20/50-1
OD_BCVA: 20/25-1

## 2025-08-27 ASSESSMENT — CORNEAL EDEMA - FOLDS/STRIAE: OD_FOLDSSTRIAE: T

## 2025-08-27 ASSESSMENT — CORNEAL EDEMA CLINICAL DESCRIPTION: OD_CORNEALEDEMA: CENTRAL
